# Patient Record
Sex: FEMALE | Race: WHITE | NOT HISPANIC OR LATINO | Employment: OTHER | URBAN - METROPOLITAN AREA
[De-identification: names, ages, dates, MRNs, and addresses within clinical notes are randomized per-mention and may not be internally consistent; named-entity substitution may affect disease eponyms.]

---

## 2020-03-12 ENCOUNTER — APPOINTMENT (OUTPATIENT)
Dept: WOMENS IMAGING | Facility: HOSPITAL | Age: 75
End: 2020-03-12

## 2020-03-12 PROCEDURE — 77067 SCR MAMMO BI INCL CAD: CPT | Performed by: RADIOLOGY

## 2020-09-14 ENCOUNTER — TRANSCRIBE ORDERS (OUTPATIENT)
Dept: ADMINISTRATIVE | Facility: HOSPITAL | Age: 75
End: 2020-09-14

## 2020-09-14 DIAGNOSIS — R06.02 SHORTNESS OF BREATH: ICD-10-CM

## 2020-09-14 DIAGNOSIS — R07.2 PRECORDIAL PAIN: Primary | ICD-10-CM

## 2020-09-22 ENCOUNTER — TRANSCRIBE ORDERS (OUTPATIENT)
Dept: ADMINISTRATIVE | Facility: HOSPITAL | Age: 75
End: 2020-09-22

## 2020-09-22 DIAGNOSIS — R06.02 EXERCISE-INDUCED SHORTNESS OF BREATH: Primary | ICD-10-CM

## 2020-10-20 ENCOUNTER — APPOINTMENT (OUTPATIENT)
Dept: CT IMAGING | Facility: HOSPITAL | Age: 75
End: 2020-10-20

## 2020-11-30 ENCOUNTER — APPOINTMENT (OUTPATIENT)
Dept: CT IMAGING | Facility: HOSPITAL | Age: 75
End: 2020-11-30

## 2021-04-22 ENCOUNTER — OFFICE VISIT (OUTPATIENT)
Dept: CARDIOLOGY | Facility: CLINIC | Age: 76
End: 2021-04-22

## 2021-04-22 VITALS
WEIGHT: 129.4 LBS | OXYGEN SATURATION: 98 % | HEIGHT: 55 IN | RESPIRATION RATE: 18 BRPM | BODY MASS INDEX: 29.94 KG/M2 | SYSTOLIC BLOOD PRESSURE: 124 MMHG | DIASTOLIC BLOOD PRESSURE: 66 MMHG | HEART RATE: 103 BPM

## 2021-04-22 DIAGNOSIS — R00.2 PALPITATIONS: Primary | ICD-10-CM

## 2021-04-22 DIAGNOSIS — I20.8 STABLE ANGINA PECTORIS (HCC): ICD-10-CM

## 2021-04-22 PROBLEM — R07.9 CHEST PAIN: Status: ACTIVE | Noted: 2021-04-22

## 2021-04-22 PROCEDURE — 99204 OFFICE O/P NEW MOD 45 MIN: CPT | Performed by: INTERNAL MEDICINE

## 2021-04-22 RX ORDER — LANOLIN ALCOHOL/MO/W.PET/CERES
1000 CREAM (GRAM) TOPICAL DAILY
COMMUNITY
End: 2022-06-24

## 2021-04-22 NOTE — PROGRESS NOTES
[facility] [place]  [facility] CARDIOLOGY  [address] [place] [address]  Dept: [phone]  Dept Fax: [phone]    Nesha Bacon  1945    New Patient Office Note    History of Present Illness:  Nesha Bacon is a 76 y.o. female who presents to the clinic for Palpitations and Chest pain, She is 76, years old has seen [facility] cardiology, in 2019 for chest pain, has had a stress nuclear normal, she keeps having chest pain, at resting and also walking, almost daily, she also feels  Like she is  Going to passed out with palpitations  That last for 5 minutes, on a daily basis, , her EKG is abnormal, suggest anteroseptal MI, her cardiac exam is normal, she denies any DM< Hypertension,    No smoker, her cardiac exam is normal BP is 120.80. , will get a Holter and laos a CTA coronary arteries.     The following portions of the patient's history were reviewed and updated as appropriate: allergies, current medications, past family history, past medical history, past social history, past surgical history and problem list.    Medications:  vitamin B-12    Subjective  Allergies   Allergen Reactions   • Aspirin Palpitations     Chest tightness   • Latex Rash        Past Medical History:   Diagnosis Date   • Acute cystitis without hematuria    • Age-related osteoporosis without current pathological fracture    • Back pain    • BMI 20.0-20.9, adult    • Bone injury     BONE OR JOINT INJURIES   • Chronic fatigue    • Chronic obstructive pulmonary disease (CMS/HCC)    • Closed fracture of left hip, with routine healing, subsequent encounter    • Lightheadedness     CHRONIC   • Paroxysmal atrial fibrillation (CMS/HCC)    • Postoperative anemia    • Pure hypercholesterolemia    • Renal cancer (CMS/HCC)    • Shortness of breath        Past Surgical History:   Procedure Laterality Date   • CATARACT EXTRACTION, BILATERAL     • CHOLECYSTECTOMY     • HIP FRACTURE SURGERY     • LUMBAR LAMINECTOMY     •  "NEPHRECTOMY PARTIAL     • TUBAL ABDOMINAL LIGATION     • VAGINAL HYSTERECTOMY SALPINGO OOPHORECTOMY WITH ANTERIOR AND POSTERIOR VAGINAL REPAIR         Family History   Problem Relation Age of Onset   • Stroke Mother    • Hypertension Mother    • Coronary artery disease Brother         Social History     Socioeconomic History   • Marital status: Single     Spouse name: Not on file   • Number of children: Not on file   • Years of education: Not on file   • Highest education level: Not on file   Tobacco Use   • Smoking status: Never Smoker   • Smokeless tobacco: Never Used   Substance and Sexual Activity   • Alcohol use: Not Currently   • Drug use: Never       Review of Systems   Constitutional: Negative.    HENT: Negative.    Respiratory: Negative.    Cardiovascular: Positive for chest pain and palpitations.   Endocrine: Negative.    Genitourinary: Negative.    Musculoskeletal: Negative.    Skin: Negative.    Allergic/Immunologic: Negative.    Neurological: Negative.    Hematological: Negative.    Psychiatric/Behavioral: Negative.        Cardiovascular Procedures    ECHO/MUGA:   STRESS TESTS:   CARDIAC CATH:   DEVICES:   HOLTER:   CT/MRI:   VASCULAR:   CARDIOTHORACIC:     Objective  Vitals:    04/22/21 1521   BP: 124/66   BP Location: Left arm   Patient Position: Sitting   Cuff Size: Adult   Pulse: 103   Resp: 18   SpO2: 98%   Weight: 58.7 kg (129 lb 6.4 oz)   Height: 66 cm (25.98\")       Physical Exam  Constitutional:       Appearance: Healthy appearance. Not in distress.   Neck:      Vascular: No JVR. JVD normal.   Pulmonary:      Effort: Pulmonary effort is normal.      Breath sounds: Normal breath sounds. No wheezing. No rhonchi. No rales.   Chest:      Chest wall: Not tender to palpatation.   Cardiovascular:      PMI at left midclavicular line. Normal rate. Regular rhythm. Normal S1. Normal S2.      Murmurs: There is no murmur.      No gallop. No click. No rub.   Pulses:     Intact distal pulses.   Edema:     " Peripheral edema absent.   Abdominal:      General: Bowel sounds are normal.      Palpations: Abdomen is soft.      Tenderness: There is no abdominal tenderness.   Musculoskeletal: Normal range of motion.         General: No tenderness. Skin:     General: Skin is warm and dry.   Neurological:      General: No focal deficit present.      Mental Status: Alert and oriented to person, place and time.          Diagnostic Data  Procedures    Assessment and Plan  Diagnoses and all orders for this visit:    Palpitations- this happens almost daily , will get a Holter, prior echo in 2019 was normal    Stable angina pectoris (CMS/HCC)- She keeps having chest pain, prior stress nuclear was normal 2019, will get a CTA,     Other orders  -     vitamin B-12 (CYANOCOBALAMIN) 1000 MCG tablet; Take 1,000 mcg by mouth Daily.  -     Holter Monitor - 48 Hour; Future  -     Stress Test With Myocardial Perfusion One Day; Future        No follow-ups on file.    Adiel Kang MD  04/22/2021

## 2021-04-30 ENCOUNTER — OFFICE VISIT (OUTPATIENT)
Dept: CARDIOLOGY | Facility: CLINIC | Age: 76
End: 2021-04-30

## 2021-04-30 VITALS — WEIGHT: 125 LBS | BODY MASS INDEX: 20.09 KG/M2 | HEIGHT: 66 IN

## 2021-04-30 DIAGNOSIS — R00.2 PALPITATIONS: Primary | ICD-10-CM

## 2021-04-30 DIAGNOSIS — I20.8 STABLE ANGINA PECTORIS (HCC): ICD-10-CM

## 2021-04-30 PROCEDURE — 99214 OFFICE O/P EST MOD 30 MIN: CPT | Performed by: INTERNAL MEDICINE

## 2021-04-30 RX ORDER — FLECAINIDE ACETATE 50 MG/1
50 TABLET ORAL 2 TIMES DAILY
Qty: 180 TABLET | Refills: 3 | Status: SHIPPED | OUTPATIENT
Start: 2021-04-30 | End: 2021-05-06 | Stop reason: SDUPTHER

## 2021-04-30 NOTE — PROGRESS NOTES
[facility] [place]  [facility] CARDIOLOGY  [address] [place] [address]  Dept: [phone]  Dept Fax: [phone]    Nesha Bacon  1945    Televisit Note    You have chosen to receive care through a telephone visit. Do you consent to use a telephone visit for your medical care today? Yes    History of Present Illness:  Nesha Bacon is a 76 y.o. female who presents via phone for Follow-up. She underwent the Holter monitor and this show multiples events of flutter- fibrillation., will use Tambocor 50 mg bid and also Xarelto 20 mg daily     The following portions of the patient's history were reviewed and updated as appropriate: allergies, current medications, past family history, past medical history, past social history, past surgical history and problem list.    This visit was scheduled as a phone visit to comply with patient safety concerns in accordance with CDC recommendations.  Time spent in discussion with the patient was 30 minutes.     Medications  vitamin B-12    Subjective  Allergies   Allergen Reactions   • Aspirin Palpitations     Chest tightness   • Latex Rash        Past Medical History:   Diagnosis Date   • Acute cystitis without hematuria    • Age-related osteoporosis without current pathological fracture    • Back pain    • BMI 20.0-20.9, adult    • Bone injury     BONE OR JOINT INJURIES   • Chronic fatigue    • Chronic obstructive pulmonary disease (CMS/HCC)    • Closed fracture of left hip, with routine healing, subsequent encounter    • Lightheadedness     CHRONIC   • Paroxysmal atrial fibrillation (CMS/HCC)    • Postoperative anemia    • Pure hypercholesterolemia    • Renal cancer (CMS/HCC)    • Shortness of breath        Past Surgical History:   Procedure Laterality Date   • CATARACT EXTRACTION, BILATERAL     • CHOLECYSTECTOMY     • HIP FRACTURE SURGERY     • LUMBAR LAMINECTOMY     • NEPHRECTOMY PARTIAL     • TUBAL ABDOMINAL LIGATION     • VAGINAL  "HYSTERECTOMY SALPINGO OOPHORECTOMY WITH ANTERIOR AND POSTERIOR VAGINAL REPAIR         Family History   Problem Relation Age of Onset   • Stroke Mother    • Hypertension Mother    • Coronary artery disease Brother         Social History     Socioeconomic History   • Marital status: Single     Spouse name: Not on file   • Number of children: Not on file   • Years of education: Not on file   • Highest education level: Not on file   Tobacco Use   • Smoking status: Never Smoker   • Smokeless tobacco: Never Used   Substance and Sexual Activity   • Alcohol use: Not Currently   • Drug use: Never   • Sexual activity: Defer       Cardiovascular Procedures    ECHO/MUGA:   STRESS TESTS:   CARDIAC CATH:   DEVICES:   HOLTER:   CT/MRI:   VASCULAR:   CARDIOTHORACIC:     Objective  Vitals:    04/30/21 1612   Weight: 56.7 kg (125 lb)   Height: 167.6 cm (66\")   PainSc: 0-No pain     Body mass index is 20.18 kg/m².    Assessment and Plan  Diagnoses and all orders for this visit:    Paroxysmal atrial fibrillation - Holter multiples events, will use Tambocor 50 mg bid and also Xarelto 20 mg     Stable angina pectoris (CMS/HCC)- waiting for the CTA         No follow-ups on file.    Adiel Kang MD  04/30/2021  "

## 2021-05-04 ENCOUNTER — TELEPHONE (OUTPATIENT)
Dept: CARDIOLOGY | Facility: CLINIC | Age: 76
End: 2021-05-04

## 2021-05-06 ENCOUNTER — OFFICE VISIT (OUTPATIENT)
Dept: CARDIOLOGY | Facility: CLINIC | Age: 76
End: 2021-05-06

## 2021-05-06 ENCOUNTER — TELEPHONE (OUTPATIENT)
Dept: CARDIOLOGY | Facility: CLINIC | Age: 76
End: 2021-05-06

## 2021-05-06 VITALS — HEIGHT: 66 IN | BODY MASS INDEX: 20.25 KG/M2 | WEIGHT: 126 LBS

## 2021-05-06 DIAGNOSIS — I48.0 PAROXYSMAL ATRIAL FIBRILLATION (HCC): Primary | ICD-10-CM

## 2021-05-06 DIAGNOSIS — I20.8 STABLE ANGINA PECTORIS (HCC): ICD-10-CM

## 2021-05-06 DIAGNOSIS — R00.2 PALPITATIONS: ICD-10-CM

## 2021-05-06 PROCEDURE — 99443 PR PHYS/QHP TELEPHONE EVALUATION 21-30 MIN: CPT | Performed by: INTERNAL MEDICINE

## 2021-05-06 RX ORDER — FLECAINIDE ACETATE 100 MG/1
100 TABLET ORAL 2 TIMES DAILY
Qty: 60 TABLET | Refills: 3 | Status: SHIPPED | OUTPATIENT
Start: 2021-05-06 | End: 2021-09-20 | Stop reason: SDUPTHER

## 2021-05-06 NOTE — PROGRESS NOTES
[facility] [place]  [facility] CARDIOLOGY  [address] [place] [address]  Dept: [phone]  Dept Fax: [phone]    Nesha Bacon  1945    Televisit Note    You have chosen to receive care through a telephone visit. Do you consent to use a telephone visit for your medical care today? Yes    History of Present Illness:  Nesha Bacon is a 76 y.o. female who presents via  Phone for Paroxysmal atrial fibrillation- She thinks is not improving despite taking Tambocor 50 mg bid, , will increase Further to 100 mg bid, , will keep Xarelto 20 mg     The following portions of the patient's history were reviewed and updated as appropriate: allergies, current medications, past family history, past medical history, past social history, past surgical history and problem list.    This visit was scheduled as a phone visit to comply with patient safety concerns in accordance with CDC recommendations.  Time spent in discussion with the patient was 30 minutes.     Medications  flecainide  rivaroxaban  vitamin B-12    Subjective  Allergies   Allergen Reactions   • Aspirin Palpitations     Chest tightness   • Latex Rash        Past Medical History:   Diagnosis Date   • Acute cystitis without hematuria    • Age-related osteoporosis without current pathological fracture    • Back pain    • BMI 20.0-20.9, adult    • Bone injury     BONE OR JOINT INJURIES   • Chronic fatigue    • Chronic obstructive pulmonary disease (CMS/HCC)    • Closed fracture of left hip, with routine healing, subsequent encounter    • Lightheadedness     CHRONIC   • Paroxysmal atrial fibrillation (CMS/HCC)    • Postoperative anemia    • Pure hypercholesterolemia    • Renal cancer (CMS/HCC)    • Shortness of breath        Past Surgical History:   Procedure Laterality Date   • CATARACT EXTRACTION, BILATERAL     • CHOLECYSTECTOMY     • HIP FRACTURE SURGERY     • LUMBAR LAMINECTOMY     • NEPHRECTOMY PARTIAL     • TUBAL ABDOMINAL  "LIGATION     • VAGINAL HYSTERECTOMY SALPINGO OOPHORECTOMY WITH ANTERIOR AND POSTERIOR VAGINAL REPAIR         Family History   Problem Relation Age of Onset   • Stroke Mother    • Hypertension Mother    • Coronary artery disease Brother         Social History     Socioeconomic History   • Marital status: Single     Spouse name: Not on file   • Number of children: Not on file   • Years of education: Not on file   • Highest education level: Not on file   Tobacco Use   • Smoking status: Never Smoker   • Smokeless tobacco: Never Used   Substance and Sexual Activity   • Alcohol use: Not Currently   • Drug use: Never   • Sexual activity: Defer       Cardiovascular Procedures    ECHO/MUGA:   STRESS TESTS:   CARDIAC CATH:   DEVICES:   HOLTER:   CT/MRI:   VASCULAR:   CARDIOTHORACIC:     Objective  Vitals:    05/06/21 1145   Weight: 57.2 kg (126 lb)   Height: 167.6 cm (66\")   PainSc: 0-No pain     Body mass index is 20.34 kg/m².    Assessment and Plan  Diagnoses and all orders for this visit:    Paroxysmal atrial fibrillation (CMS/HCC)-- She seems is not better, will increase Tambocor to 100 mg bid, keep Xarelto, will see her in 2 weeks         Stable angina pectoris (CMS/HCC)- she is waiting for the CTA coronary arteries , stress nuclear was normal         Return in about 2 weeks (around 5/20/2021) for Recheck.    Adiel Kang MD  05/06/2021  "

## 2021-05-20 ENCOUNTER — CLINICAL SUPPORT (OUTPATIENT)
Dept: CARDIOLOGY | Facility: CLINIC | Age: 76
End: 2021-05-20

## 2021-05-20 DIAGNOSIS — I48.0 PAROXYSMAL ATRIAL FIBRILLATION (HCC): Primary | ICD-10-CM

## 2021-05-20 PROCEDURE — 93000 ELECTROCARDIOGRAM COMPLETE: CPT | Performed by: INTERNAL MEDICINE

## 2021-05-20 NOTE — PROGRESS NOTES
ECG 12 Lead    Date/Time: 5/20/2021 3:56 PM  Performed by: Adiel Kang MD  Authorized by: Adiel Kang MD   Comparison: compared with previous ECG   Rhythm: sinus rhythm  Rate: normal  BPM: 72  QRS axis: left    Clinical impression: normal ECG

## 2021-05-21 ENCOUNTER — TELEPHONE (OUTPATIENT)
Dept: CARDIOLOGY | Facility: CLINIC | Age: 76
End: 2021-05-21

## 2021-06-01 ENCOUNTER — HOSPITAL ENCOUNTER (OUTPATIENT)
Dept: CT IMAGING | Facility: HOSPITAL | Age: 76
Discharge: HOME | End: 2021-06-01
Admitting: INTERNAL MEDICINE

## 2021-06-01 VITALS
TEMPERATURE: 97.8 F | WEIGHT: 135.4 LBS | SYSTOLIC BLOOD PRESSURE: 126 MMHG | RESPIRATION RATE: 20 BRPM | DIASTOLIC BLOOD PRESSURE: 68 MMHG | HEIGHT: 66 IN | BODY MASS INDEX: 21.76 KG/M2 | OXYGEN SATURATION: 97 % | HEART RATE: 95 BPM

## 2021-06-01 DIAGNOSIS — I20.8 STABLE ANGINA PECTORIS (HCC): ICD-10-CM

## 2021-06-01 PROCEDURE — 75574 CT ANGIO HRT W/3D IMAGE: CPT

## 2021-06-01 PROCEDURE — 63710000001 NITROGLYCERIN 0.4 MG SUBLINGUAL TABLET 25 EACH BOTTLE

## 2021-06-01 PROCEDURE — 0 IOPAMIDOL PER 1 ML: Performed by: INTERNAL MEDICINE

## 2021-06-01 PROCEDURE — 82565 ASSAY OF CREATININE: CPT

## 2021-06-01 PROCEDURE — A9270 NON-COVERED ITEM OR SERVICE: HCPCS

## 2021-06-01 RX ORDER — METOPROLOL TARTRATE 50 MG/1
100 TABLET, FILM COATED ORAL ONCE AS NEEDED
Status: DISCONTINUED | OUTPATIENT
Start: 2021-06-01 | End: 2021-06-02 | Stop reason: HOSPADM

## 2021-06-01 RX ORDER — NITROGLYCERIN 0.4 MG/1
TABLET SUBLINGUAL
Status: COMPLETED
Start: 2021-06-01 | End: 2021-06-01

## 2021-06-01 RX ORDER — METOPROLOL TARTRATE 50 MG/1
50 TABLET, FILM COATED ORAL ONCE AS NEEDED
Status: DISCONTINUED | OUTPATIENT
Start: 2021-06-01 | End: 2021-06-02 | Stop reason: HOSPADM

## 2021-06-01 RX ORDER — SODIUM CHLORIDE 0.9 % (FLUSH) 0.9 %
10 SYRINGE (ML) INJECTION AS NEEDED
Status: DISCONTINUED | OUTPATIENT
Start: 2021-06-01 | End: 2021-06-02 | Stop reason: HOSPADM

## 2021-06-01 RX ADMIN — NITROGLYCERIN: 0.4 TABLET SUBLINGUAL at 10:09

## 2021-06-01 RX ADMIN — IOPAMIDOL 65 ML: 755 INJECTION, SOLUTION INTRAVENOUS at 10:10

## 2021-06-03 ENCOUNTER — OFFICE VISIT (OUTPATIENT)
Dept: CARDIOLOGY | Facility: CLINIC | Age: 76
End: 2021-06-03

## 2021-06-03 VITALS
TEMPERATURE: 98 F | OXYGEN SATURATION: 96 % | HEART RATE: 85 BPM | HEIGHT: 66 IN | BODY MASS INDEX: 21.69 KG/M2 | RESPIRATION RATE: 11 BRPM | SYSTOLIC BLOOD PRESSURE: 136 MMHG | WEIGHT: 135 LBS | DIASTOLIC BLOOD PRESSURE: 72 MMHG

## 2021-06-03 DIAGNOSIS — I48.0 PAROXYSMAL ATRIAL FIBRILLATION (HCC): ICD-10-CM

## 2021-06-03 DIAGNOSIS — I20.8 STABLE ANGINA PECTORIS (HCC): Primary | ICD-10-CM

## 2021-06-03 PROCEDURE — 93000 ELECTROCARDIOGRAM COMPLETE: CPT | Performed by: INTERNAL MEDICINE

## 2021-06-03 PROCEDURE — 99214 OFFICE O/P EST MOD 30 MIN: CPT | Performed by: INTERNAL MEDICINE

## 2021-06-03 NOTE — PROGRESS NOTES
[facility] [place]  [facility] CARDIOLOGY  [address] [place] [address]  Dept: [phone]  Dept Fax: [phone]    Nesha Bacon  1945    Follow Up Office Visit Note    History of Present Illness:  Nesha Bacon is a 76 y.o. female who presents to the clinic for Follow-up, Shortness of Breath, and Dizziness. And also palpitations, We have done a Holter and has multiples episodes of PAF. We placed her on flecainide 50 mg bid, no better, we increased to 100 mg bid, still have complaints ? Weakness, dizzy, feeling like is going to passed out, will hget a Holter 48 hours,  She is also on Xarelto     The following portions of the patient's history were reviewed and updated as appropriate: allergies, current medications, past family history, past medical history, past social history, past surgical history and problem list.    Medications:  flecainide  rivaroxaban  vitamin B-12    Subjective  Allergies   Allergen Reactions   • Aspirin Palpitations     Chest tightness   • Latex Rash        Past Medical History:   Diagnosis Date   • Acute cystitis without hematuria    • Age-related osteoporosis without current pathological fracture    • Back pain    • BMI 20.0-20.9, adult    • Bone injury     BONE OR JOINT INJURIES   • Chronic fatigue    • Chronic obstructive pulmonary disease (CMS/HCC)    • Closed fracture of left hip, with routine healing, subsequent encounter    • Lightheadedness     CHRONIC   • Paroxysmal atrial fibrillation (CMS/HCC)    • Postoperative anemia    • Pure hypercholesterolemia    • Renal cancer (CMS/HCC)    • Shortness of breath        Past Surgical History:   Procedure Laterality Date   • CATARACT EXTRACTION, BILATERAL     • CHOLECYSTECTOMY     • HIP FRACTURE SURGERY     • LUMBAR LAMINECTOMY     • NEPHRECTOMY PARTIAL     • TUBAL ABDOMINAL LIGATION     • VAGINAL HYSTERECTOMY SALPINGO OOPHORECTOMY WITH ANTERIOR AND POSTERIOR VAGINAL REPAIR         Family  "History   Problem Relation Age of Onset   • Stroke Mother    • Hypertension Mother    • Coronary artery disease Brother         Social History     Socioeconomic History   • Marital status: Single     Spouse name: Not on file   • Number of children: Not on file   • Years of education: Not on file   • Highest education level: Not on file   Tobacco Use   • Smoking status: Never Smoker   • Smokeless tobacco: Never Used   Substance and Sexual Activity   • Alcohol use: Not Currently   • Drug use: Never   • Sexual activity: Defer       Review of Systems   Cardiovascular: Positive for palpitations.   Neurological: Positive for dizziness and light-headedness.       Cardiovascular Procedures    ECHO/MUGA:   STRESS TESTS:   CARDIAC CATH:   DEVICES:   HOLTER:   CT/MRI:   VASCULAR:   CARDIOTHORACIC:     Objective  Vitals:    06/03/21 1518   BP: 136/72   BP Location: Left arm   Patient Position: Sitting   Cuff Size: Adult   Pulse: 85   Resp: 11   Temp: 98 °F (36.7 °C)   TempSrc: Infrared   SpO2: 96%   Weight: 61.2 kg (135 lb)   Height: 167.6 cm (66\")   PainSc: 0-No pain     Body mass index is 21.79 kg/m².     Physical Exam  Constitutional:       Appearance: Healthy appearance. Not in distress.   Neck:      Vascular: No JVR. JVD normal.   Pulmonary:      Effort: Pulmonary effort is normal.      Breath sounds: Normal breath sounds. No wheezing. No rhonchi. No rales.   Chest:      Chest wall: Not tender to palpatation.   Cardiovascular:      PMI at left midclavicular line. Normal rate. Regular rhythm. Normal S1. Normal S2.      Murmurs: There is no murmur.      No gallop. No click. No rub.   Pulses:     Intact distal pulses.   Edema:     Peripheral edema absent.   Abdominal:      General: Bowel sounds are normal.      Palpations: Abdomen is soft.      Tenderness: There is no abdominal tenderness.   Musculoskeletal: Normal range of motion.         General: No tenderness. Skin:     General: Skin is warm and dry.   Neurological:      " General: No focal deficit present.      Mental Status: Alert and oriented to person, place and time.          Diagnostic Data    ECG 12 Lead    Date/Time: 6/3/2021 3:48 PM  Performed by: Adiel Kang MD  Authorized by: Adiel Kang MD   Comparison: compared with previous ECG   Rhythm: sinus rhythm  Rate: normal  BPM: 70  Conduction: left anterior fascicular block  QRS axis: left    Clinical impression: normal ECG            Assessment and Plan  Diagnoses and all orders for this visit:    Stable angina pectoris (CMS/HCC)- Go the CTA pending results     Paroxysmal atrial fibrillation (CMS/HCC)- persistent symptoms despite flecainide  100 mg bid, will repeat Holter  -     Holter Monitor - 48 Hour; Future         Return in about 3 weeks (around 6/24/2021).    Adiel Kang MD  06/03/2021

## 2021-06-04 ENCOUNTER — TELEPHONE (OUTPATIENT)
Dept: CARDIOLOGY | Facility: CLINIC | Age: 76
End: 2021-06-04

## 2021-06-04 NOTE — TELEPHONE ENCOUNTER
Normal heart no blockages, therefore her chest pain, is not cardiac, possible related to emotional stress , please see PCP.  Pt contacted

## 2021-06-07 LAB — CREAT BLDA-MCNC: 0.9 MG/DL (ref 0.6–1.3)

## 2021-07-12 ENCOUNTER — TELEPHONE (OUTPATIENT)
Dept: CARDIOLOGY | Facility: CLINIC | Age: 76
End: 2021-07-12

## 2021-07-12 NOTE — TELEPHONE ENCOUNTER
XARELTO IS TOO EXPENSIVE,,,,,,, IS THERE SOMETHING ELSE ?      ALSO WOULD LIKE TO KNOW HOLTOR MONITOR RESULTS

## 2021-07-13 NOTE — TELEPHONE ENCOUNTER
Please advise...    XARELTO IS TOO EXPENSIVE,,,,,,, IS THERE SOMETHING ELSE ?        ALSO WOULD LIKE TO KNOW HOLTOR MONITOR RESULTS

## 2021-07-13 NOTE — TELEPHONE ENCOUNTER
Please ask the patient to call her insurance and find out if Xarelto, Eliquis or Pradaxa might be lower price maybe is the deducible

## 2021-07-30 ENCOUNTER — OFFICE VISIT (OUTPATIENT)
Dept: CARDIOLOGY | Facility: CLINIC | Age: 76
End: 2021-07-30

## 2021-07-30 VITALS
RESPIRATION RATE: 12 BRPM | BODY MASS INDEX: 21.86 KG/M2 | HEIGHT: 66 IN | HEART RATE: 94 BPM | SYSTOLIC BLOOD PRESSURE: 130 MMHG | TEMPERATURE: 97 F | OXYGEN SATURATION: 97 % | WEIGHT: 136 LBS | DIASTOLIC BLOOD PRESSURE: 70 MMHG

## 2021-07-30 DIAGNOSIS — I48.0 PAROXYSMAL ATRIAL FIBRILLATION (HCC): ICD-10-CM

## 2021-07-30 DIAGNOSIS — I20.8 STABLE ANGINA PECTORIS (HCC): ICD-10-CM

## 2021-07-30 DIAGNOSIS — R00.2 PALPITATIONS: Primary | ICD-10-CM

## 2021-07-30 PROCEDURE — 99214 OFFICE O/P EST MOD 30 MIN: CPT | Performed by: INTERNAL MEDICINE

## 2021-07-30 PROCEDURE — 93000 ELECTROCARDIOGRAM COMPLETE: CPT | Performed by: INTERNAL MEDICINE

## 2021-07-30 RX ORDER — METOPROLOL SUCCINATE 25 MG/1
25 TABLET, EXTENDED RELEASE ORAL DAILY
Qty: 30 TABLET | Refills: 11 | Status: SHIPPED | OUTPATIENT
Start: 2021-07-30 | End: 2022-06-07 | Stop reason: SDUPTHER

## 2021-07-30 NOTE — PROGRESS NOTES
[facility] [place]  [facility] CARDIOLOGY  [address] [place] [address]  Dept: [phone]  Dept Fax: [phone]    Nesha Bacon  1945    Follow Up Office Visit Note    History of Present Illness:  Nesha Bacon is a 76 y.o. female who presents to the clinic for Follow-up and Dizziness. She keeps having the complaints dizziness, fast heart rate , Holter only showed & beats of AF Hr 130. otherwise was  Fine, her complaints are out of proportion to her findings, her CTA was normal, Echo normal. On tambocor 100 mg bid and also Xarelto 20 mg daily, will add Toprol xl 25 mg    The following portions of the patient's history were reviewed and updated as appropriate: allergies, current medications, past family history, past medical history, past social history, past surgical history and problem list.    Medications:  flecainide  metoprolol succinate XL  rivaroxaban  vitamin B-12    Subjective  Allergies   Allergen Reactions   • Aspirin Palpitations     Chest tightness   • Latex Rash        Past Medical History:   Diagnosis Date   • Acute cystitis without hematuria    • Age-related osteoporosis without current pathological fracture    • Back pain    • BMI 20.0-20.9, adult    • Bone injury     BONE OR JOINT INJURIES   • Chronic fatigue    • Chronic obstructive pulmonary disease (CMS/HCC)    • Closed fracture of left hip, with routine healing, subsequent encounter    • Lightheadedness     CHRONIC   • Paroxysmal atrial fibrillation (CMS/HCC)    • Postoperative anemia    • Pure hypercholesterolemia    • Renal cancer (CMS/HCC)    • Shortness of breath        Past Surgical History:   Procedure Laterality Date   • CATARACT EXTRACTION, BILATERAL     • CHOLECYSTECTOMY     • HIP FRACTURE SURGERY     • LUMBAR LAMINECTOMY     • NEPHRECTOMY PARTIAL     • TUBAL ABDOMINAL LIGATION     • VAGINAL HYSTERECTOMY SALPINGO OOPHORECTOMY WITH ANTERIOR AND POSTERIOR VAGINAL REPAIR         Family  "History   Problem Relation Age of Onset   • Stroke Mother    • Hypertension Mother    • Coronary artery disease Brother         Social History     Socioeconomic History   • Marital status: Single     Spouse name: Not on file   • Number of children: Not on file   • Years of education: Not on file   • Highest education level: Not on file   Tobacco Use   • Smoking status: Never Smoker   • Smokeless tobacco: Never Used   Substance and Sexual Activity   • Alcohol use: Not Currently   • Drug use: Never   • Sexual activity: Defer       Review of Systems   Constitutional: Negative.    HENT: Negative.    Respiratory: Positive for chest tightness.    Cardiovascular: Positive for chest pain.   Endocrine: Negative.    Genitourinary: Negative.    Musculoskeletal: Negative.    Skin: Negative.    Allergic/Immunologic: Negative.    Neurological: Positive for dizziness.   Hematological: Negative.    Psychiatric/Behavioral: Negative.    All other systems reviewed and are negative.      Cardiovascular Procedures    ECHO/MUGA:   STRESS TESTS:   CARDIAC CATH:   DEVICES:   HOLTER:   CT/MRI:   VASCULAR:   CARDIOTHORACIC:     Objective  Vitals:    07/30/21 1132   BP: 130/70   BP Location: Left arm   Patient Position: Lying   Cuff Size: Adult   Pulse: 94   Resp: 12   Temp: 97 °F (36.1 °C)   TempSrc: Infrared   SpO2: 97%   Weight: 61.7 kg (136 lb)   Height: 167.6 cm (66\")   PainSc: 0-No pain     Body mass index is 21.95 kg/m².     Physical Exam  Constitutional:       Appearance: Healthy appearance. Not in distress.   Neck:      Vascular: No JVR. JVD normal.   Pulmonary:      Effort: Pulmonary effort is normal.      Breath sounds: Normal breath sounds. No wheezing. No rhonchi. No rales.   Chest:      Chest wall: Not tender to palpatation.   Cardiovascular:      PMI at left midclavicular line. Normal rate. Regular rhythm. Normal S1. Normal S2.      Murmurs: There is no murmur.      No gallop. No click. No rub.   Pulses:     Intact distal " pulses.   Edema:     Peripheral edema absent.   Abdominal:      General: Bowel sounds are normal.      Palpations: Abdomen is soft.      Tenderness: There is no abdominal tenderness.   Musculoskeletal: Normal range of motion.         General: No tenderness. Skin:     General: Skin is warm and dry.   Neurological:      General: No focal deficit present.      Mental Status: Alert and oriented to person, place and time.          Diagnostic Data    ECG 12 Lead    Date/Time: 7/30/2021 11:59 AM  Performed by: Adiel Kang MD  Authorized by: Adiel Kang MD   Comparison: compared with previous ECG   Similar to previous ECG  Rhythm: sinus rhythm  Rate: normal  BPM: 75  QRS axis: left    Clinical impression: normal ECG            Assessment and Plan  Diagnoses and all orders for this visit:    Palpitations-Her complaints are out of proportion to her findings,         Paroxysmal atrial fibrillation (CMS/HCC)- On Tambocor 100 mg bid and also Xarelto 20 mg  Chest pain- stress nuclear, normal CTA coronary arteries normal    Other orders  -     metoprolol succinate XL (TOPROL-XL) 25 MG 24 hr tablet; Take 1 tablet by mouth Daily.         Return in about 4 months (around 11/30/2021) for Recheck.    Adiel Kang MD  07/30/2021

## 2021-09-20 DIAGNOSIS — I48.0 PAROXYSMAL ATRIAL FIBRILLATION (HCC): ICD-10-CM

## 2021-09-20 DIAGNOSIS — R00.2 PALPITATIONS: ICD-10-CM

## 2021-09-20 RX ORDER — FLECAINIDE ACETATE 100 MG/1
100 TABLET ORAL 2 TIMES DAILY
Qty: 60 TABLET | Refills: 1 | Status: SHIPPED | OUTPATIENT
Start: 2021-09-20 | End: 2021-12-07

## 2021-11-11 ENCOUNTER — APPOINTMENT (OUTPATIENT)
Dept: WOMENS IMAGING | Facility: HOSPITAL | Age: 76
End: 2021-11-11

## 2021-11-11 PROCEDURE — 77067 SCR MAMMO BI INCL CAD: CPT | Performed by: RADIOLOGY

## 2021-12-07 ENCOUNTER — OFFICE VISIT (OUTPATIENT)
Dept: CARDIOLOGY | Facility: CLINIC | Age: 76
End: 2021-12-07

## 2021-12-07 VITALS
SYSTOLIC BLOOD PRESSURE: 132 MMHG | WEIGHT: 137 LBS | TEMPERATURE: 98 F | HEIGHT: 66 IN | BODY MASS INDEX: 22.02 KG/M2 | HEART RATE: 104 BPM | DIASTOLIC BLOOD PRESSURE: 74 MMHG | RESPIRATION RATE: 12 BRPM | OXYGEN SATURATION: 99 %

## 2021-12-07 DIAGNOSIS — R07.89 CHEST PAIN, ATYPICAL: ICD-10-CM

## 2021-12-07 DIAGNOSIS — R00.2 PALPITATIONS: ICD-10-CM

## 2021-12-07 DIAGNOSIS — I48.0 PAROXYSMAL ATRIAL FIBRILLATION (HCC): Primary | ICD-10-CM

## 2021-12-07 PROBLEM — R07.9 CHEST PAIN: Status: RESOLVED | Noted: 2021-04-22 | Resolved: 2021-12-07

## 2021-12-07 PROCEDURE — 99213 OFFICE O/P EST LOW 20 MIN: CPT | Performed by: INTERNAL MEDICINE

## 2021-12-07 PROCEDURE — 93000 ELECTROCARDIOGRAM COMPLETE: CPT | Performed by: INTERNAL MEDICINE

## 2021-12-07 NOTE — PROGRESS NOTES
[facility] [place]  [facility] CARDIOLOGY  [address] [place] [address]  Dept: [phone]  Dept Fax: [phone]    Nesha Bacon  1945    Follow Up Office Visit Note    History of Present Illness:  Nesha Bacon is a 76 y.o. female who presents to the clinic for Paroxysmal atrial fibrillation- She has not been on Tambocor according to her Hr slow down to 40, she is also unable to afford Xarelto,  And she is allergic to ASA. When we talk about warfarin also she thinks with all the extra will not be able to afford , will try to get Xarelto  from the company , she is only taking Toprol xl 25 mg, her holter has shown just one run of 7 beats  AF vs AT .     The following portions of the patient's history were reviewed and updated as appropriate: allergies, current medications, past family history, past medical history, past social history, past surgical history and problem list.    Medications:  metoprolol succinate XL  rivaroxaban  vitamin B-12    Subjective  Allergies   Allergen Reactions   • Aspirin Palpitations     Chest tightness   • Latex Rash        Past Medical History:   Diagnosis Date   • Acute cystitis without hematuria    • Age-related osteoporosis without current pathological fracture    • Back pain    • BMI 20.0-20.9, adult    • Bone injury     BONE OR JOINT INJURIES   • Chronic fatigue    • Chronic obstructive pulmonary disease (HCC)    • Closed fracture of left hip, with routine healing, subsequent encounter    • Lightheadedness     CHRONIC   • Paroxysmal atrial fibrillation (HCC)    • Postoperative anemia    • Pure hypercholesterolemia    • Renal cancer (HCC)    • Shortness of breath        Past Surgical History:   Procedure Laterality Date   • CATARACT EXTRACTION, BILATERAL     • CHOLECYSTECTOMY     • HIP FRACTURE SURGERY     • LUMBAR LAMINECTOMY     • NEPHRECTOMY PARTIAL     • TUBAL ABDOMINAL LIGATION     • VAGINAL HYSTERECTOMY SALPINGO OOPHORECTOMY WITH  "ANTERIOR AND POSTERIOR VAGINAL REPAIR         Family History   Problem Relation Age of Onset   • Stroke Mother    • Hypertension Mother    • Coronary artery disease Brother         Social History     Socioeconomic History   • Marital status: Single   Tobacco Use   • Smoking status: Never Smoker   • Smokeless tobacco: Never Used   Vaping Use   • Vaping Use: Never used   Substance and Sexual Activity   • Alcohol use: Not Currently   • Drug use: Never   • Sexual activity: Defer       Review of Systems   Constitutional: Negative.    HENT: Negative.    Respiratory: Negative.    Cardiovascular: Positive for palpitations.   Endocrine: Negative.    Genitourinary: Negative.    Musculoskeletal: Negative.    Skin: Negative.    Allergic/Immunologic: Negative.    Neurological: Positive for dizziness.   Hematological: Negative.    Psychiatric/Behavioral: Negative.    All other systems reviewed and are negative.      Cardiovascular Procedures    ECHO/MUGA:   STRESS TESTS:   CARDIAC CATH:   DEVICES:   HOLTER:   CT/MRI:   VASCULAR:   CARDIOTHORACIC:     Objective  Vitals:    12/07/21 1036   BP: 132/74   BP Location: Left arm   Patient Position: Sitting   Cuff Size: Adult   Pulse: 104   Resp: 12   Temp: 98 °F (36.7 °C)   TempSrc: Infrared   SpO2: 99%   Weight: 62.1 kg (137 lb)   Height: 167.6 cm (66\")   PainSc: 0-No pain     Body mass index is 22.11 kg/m².     Physical Exam  Constitutional:       Appearance: Healthy appearance. Not in distress.   Neck:      Vascular: No JVR. JVD normal.   Pulmonary:      Effort: Pulmonary effort is normal.      Breath sounds: Normal breath sounds. No wheezing. No rhonchi. No rales.   Chest:      Chest wall: Not tender to palpatation.   Cardiovascular:      PMI at left midclavicular line. Normal rate. Regular rhythm. Normal S1. Normal S2.      Murmurs: There is no murmur.      No gallop. No click. No rub.   Pulses:     Intact distal pulses.   Edema:     Peripheral edema absent.   Abdominal:      " General: Bowel sounds are normal.      Palpations: Abdomen is soft.      Tenderness: There is no abdominal tenderness.   Musculoskeletal: Normal range of motion.         General: No tenderness. Skin:     General: Skin is warm and dry.   Neurological:      General: No focal deficit present.      Mental Status: Alert and oriented to person, place and time.          Diagnostic Data    ECG 12 Lead    Date/Time: 12/7/2021 1:44 PM  Performed by: Adiel Kang MD  Authorized by: Adiel Kang MD   Rhythm: sinus rhythm  Ectopy: atrial premature contractions  Rate: normal  BPM: 84  QRS axis: left    Clinical impression: normal ECG            Assessment and Plan  Diagnoses and all orders for this visit:    Paroxysmal atrial fibrillation (HCC)- She has multiples complaints and more that the just 7 beats of atrial fibrillation seen on the monitor ,can not afford the xarelto and has stop the Flecainide , just take the Toprol xl 25 mg  Chest pain atypical  CTA negative , no more chest pain    Palpitations- Complaints often more that what we see          Return in about 6 months (around 6/7/2022) for Recheck.    Adiel Kang MD  12/07/2021

## 2021-12-15 DIAGNOSIS — R00.2 PALPITATIONS: ICD-10-CM

## 2022-06-07 ENCOUNTER — OFFICE VISIT (OUTPATIENT)
Dept: CARDIOLOGY | Facility: CLINIC | Age: 77
End: 2022-06-07

## 2022-06-07 VITALS
TEMPERATURE: 97.1 F | DIASTOLIC BLOOD PRESSURE: 56 MMHG | WEIGHT: 131 LBS | RESPIRATION RATE: 15 BRPM | HEART RATE: 98 BPM | BODY MASS INDEX: 21.05 KG/M2 | OXYGEN SATURATION: 97 % | HEIGHT: 66 IN | SYSTOLIC BLOOD PRESSURE: 96 MMHG

## 2022-06-07 DIAGNOSIS — R07.89 CHEST PAIN, ATYPICAL: ICD-10-CM

## 2022-06-07 DIAGNOSIS — R00.2 PALPITATIONS: ICD-10-CM

## 2022-06-07 DIAGNOSIS — I48.0 PAROXYSMAL ATRIAL FIBRILLATION: Primary | ICD-10-CM

## 2022-06-07 DIAGNOSIS — I95.1 AUTONOMIC ORTHOSTATIC HYPOTENSION: ICD-10-CM

## 2022-06-07 PROCEDURE — 99214 OFFICE O/P EST MOD 30 MIN: CPT | Performed by: INTERNAL MEDICINE

## 2022-06-07 RX ORDER — FLUDROCORTISONE ACETATE 0.1 MG/1
0.1 TABLET ORAL DAILY
Qty: 90 TABLET | Refills: 2 | Status: SHIPPED | OUTPATIENT
Start: 2022-06-07 | End: 2023-01-25

## 2022-06-07 RX ORDER — METOPROLOL SUCCINATE 25 MG/1
25 TABLET, EXTENDED RELEASE ORAL DAILY
Qty: 90 TABLET | Refills: 1 | Status: SHIPPED | OUTPATIENT
Start: 2022-06-07 | End: 2022-07-06 | Stop reason: SDUPTHER

## 2022-06-07 NOTE — PROGRESS NOTES
[facility] [place]  [facility] CARDIOLOGY  [address] [place] [address]  Dept: [phone]  Dept Fax: [phone]    Nesha Bacon  1945    Follow Up Office Visit Note    History of Present Illness:  Nesha Bacon is a 77 y.o. female who presents to the clinic for Follow-up. Paroxysmal atrial Fibrillation- No complaints no palpitations, on Toprol xl 25 mg, she has been complaining of dizziness lately specially after standing or being standing for long time, we check BP lying 110.60. and at 10 minutes BP dropped to 90.60, at 3 and 5 minutes, no changes, she was feeling weak and feeling like was going to passed out, this is consistent with OH, will use Midodrine 01, daily, advised to drink plenty fluids, avoid caffeine, will also  use abdominal binder, , will take the Toprol in the evening, will see her back in 1 months    The following portions of the patient's history were reviewed and updated as appropriate: allergies, current medications, past family history, past medical history, past surgical history and problem list.    Medications:  fludrocortisone  metoprolol succinate XL  rivaroxaban  vitamin B-12    Subjective  Allergies   Allergen Reactions   • Aspirin Palpitations     Chest tightness   • Latex Rash        Past Medical History:   Diagnosis Date   • Acute cystitis without hematuria    • Age-related osteoporosis without current pathological fracture    • Back pain    • BMI 20.0-20.9, adult    • Bone injury     BONE OR JOINT INJURIES   • Chronic fatigue    • Chronic obstructive pulmonary disease (HCC)    • Closed fracture of left hip, with routine healing, subsequent encounter    • Lightheadedness     CHRONIC   • Paroxysmal atrial fibrillation (HCC)    • Postoperative anemia    • Pure hypercholesterolemia    • Renal cancer (HCC)    • Shortness of breath        Past Surgical History:   Procedure Laterality Date   • CATARACT EXTRACTION, BILATERAL     •  "CHOLECYSTECTOMY     • HIP FRACTURE SURGERY     • LUMBAR LAMINECTOMY     • NEPHRECTOMY PARTIAL     • TUBAL ABDOMINAL LIGATION     • VAGINAL HYSTERECTOMY SALPINGO OOPHORECTOMY WITH ANTERIOR AND POSTERIOR VAGINAL REPAIR         Family History   Problem Relation Age of Onset   • Stroke Mother    • Hypertension Mother    • Coronary artery disease Brother         Social History     Socioeconomic History   • Marital status: Single   Tobacco Use   • Smoking status: Never Smoker   • Smokeless tobacco: Never Used   Vaping Use   • Vaping Use: Never used   Substance and Sexual Activity   • Alcohol use: Not Currently   • Drug use: Never   • Sexual activity: Defer       Review of Systems   Constitutional: Positive for appetite change.   HENT: Negative.    Respiratory: Negative.    Cardiovascular: Negative.    Endocrine: Negative.    Genitourinary: Negative.    Musculoskeletal: Negative.    Skin: Negative.    Allergic/Immunologic: Negative.    Neurological: Positive for dizziness, light-headedness and headache.   Hematological: Negative.    Psychiatric/Behavioral: Negative.        Cardiovascular Procedures    ECHO/MUGA:   STRESS TESTS:   CARDIAC CATH:   DEVICES:   HOLTER:   CT/MRI:   VASCULAR:   CARDIOTHORACIC:     Objective  Vitals:    06/07/22 1006   BP: 96/56   BP Location: Right arm   Patient Position: Sitting   Cuff Size: Adult   Pulse: 98   Resp: 15   Temp: 97.1 °F (36.2 °C)   TempSrc: Infrared   SpO2: 97%   Weight: 59.4 kg (131 lb)   Height: 167.6 cm (66\")   PainSc: 0-No pain     Body mass index is 21.14 kg/m².     Physical Exam  Vitals reviewed.   Constitutional:       Appearance: Healthy appearance. Not in distress.   Neck:      Vascular: No JVR. JVD normal.   Pulmonary:      Effort: Pulmonary effort is normal.      Breath sounds: Normal breath sounds. No wheezing. No rhonchi. No rales.   Chest:      Chest wall: Not tender to palpatation.   Cardiovascular:      PMI at left midclavicular line. Normal rate. Regular rhythm. " Normal S1. Normal S2.      Murmurs: There is no murmur.      No gallop. No click. No rub.   Pulses:     Intact distal pulses.   Edema:     Peripheral edema absent.   Abdominal:      General: Bowel sounds are normal.      Palpations: Abdomen is soft.      Tenderness: There is no abdominal tenderness.   Musculoskeletal: Normal range of motion.         General: No tenderness. Skin:     General: Skin is warm and dry.   Neurological:      General: No focal deficit present.      Mental Status: Alert and oriented to person, place and time.          Diagnostic Data  Procedures    Assessment and Plan  Diagnoses and all orders for this visit:    Paroxysmal atrial fibrillation (HCC)- In sinus on Toprol xl 25mg and Eliquis 5 mg b    Chest pain, atypical- Ct angiogram normal, no longer has chest pain,    Autonomic orthostatic hypotension- Orthostatic at 10 minutes, will use fludrocortisone 01,daily plus plenty fluids, and abdominal binder  -     Abdominal Binder    Other orders  -     fludrocortisone 0.1 MG tablet; Take 1 tablet by mouth Daily.  -     metoprolol succinate XL (TOPROL-XL) 25 MG 24 hr tablet; Take 1 tablet by mouth Daily.         Return in about 1 month (around 7/7/2022) for Recheck.    Adiel Kang MD  06/07/2022

## 2022-06-24 ENCOUNTER — OFFICE VISIT (OUTPATIENT)
Dept: FAMILY MEDICINE CLINIC | Facility: CLINIC | Age: 77
End: 2022-06-24

## 2022-06-24 VITALS
DIASTOLIC BLOOD PRESSURE: 78 MMHG | HEIGHT: 66 IN | OXYGEN SATURATION: 97 % | HEART RATE: 73 BPM | BODY MASS INDEX: 22.29 KG/M2 | SYSTOLIC BLOOD PRESSURE: 116 MMHG | WEIGHT: 138.7 LBS

## 2022-06-24 DIAGNOSIS — L30.9 ECZEMA, UNSPECIFIED TYPE: ICD-10-CM

## 2022-06-24 DIAGNOSIS — E53.8 VITAMIN B12 DEFICIENCY: Primary | ICD-10-CM

## 2022-06-24 PROBLEM — E78.2 MIXED HYPERLIPIDEMIA: Status: ACTIVE | Noted: 2022-06-24

## 2022-06-24 PROBLEM — J44.1 COPD WITH EXACERBATION: Status: ACTIVE | Noted: 2022-06-24

## 2022-06-24 PROCEDURE — 99214 OFFICE O/P EST MOD 30 MIN: CPT | Performed by: PHYSICIAN ASSISTANT

## 2022-06-24 PROCEDURE — 96372 THER/PROPH/DIAG INJ SC/IM: CPT | Performed by: PHYSICIAN ASSISTANT

## 2022-06-24 RX ORDER — CYANOCOBALAMIN 1000 UG/ML
1000 INJECTION, SOLUTION INTRAMUSCULAR; SUBCUTANEOUS WEEKLY
Status: SHIPPED | OUTPATIENT
Start: 2022-06-24 | End: 2022-07-31

## 2022-06-24 RX ORDER — CEPHALEXIN 500 MG/1
CAPSULE ORAL
COMMUNITY
Start: 2022-06-19 | End: 2022-07-13

## 2022-06-24 RX ADMIN — CYANOCOBALAMIN 1000 MCG: 1000 INJECTION, SOLUTION INTRAMUSCULAR; SUBCUTANEOUS at 13:57

## 2022-06-24 NOTE — ASSESSMENT & PLAN NOTE
AbdomenPatient has been taking vitamin B12 tablets but her B12 level has not improved therefore I am going to switch her  B12 injections she will get 4 injections once weekly for a month and then we will move her to monthly B12 injections.  She will return in August for her annual physical.

## 2022-06-24 NOTE — PROGRESS NOTES
"Chief Complaint  vitamin b12 deficiency (Patient's recent labs reveal her B12 is still low even though she takes b12 daily.  ) and Rash (On the back of her neck, it itches and has little bumps.)    Subjective          Nesha Bacon presents to [facility] PRIMARY [facility]ARE  Rash  Associated symptoms include fatigue. Pertinent negatives include no cough, fever or shortness of breath.       patient comes in for a refill on her vitamin B12 and for a rash that she has noticed on the back of her neck that itches.  States that Dr. Gómez had just recently refilled most of her other medications and he put her on a medicine to help increase her blood pressure and since then she has been feeling much better and is not as dizzy or lightheaded as she was.    Objective   Vital Signs:   /78 (BP Location: Right arm)   Pulse 73   Ht 167.6 cm (66\")   Wt 62.9 kg (138 lb 11.2 oz)   SpO2 97%   BMI 22.39 kg/m²     Body mass index is 22.39 kg/m².    Review of Systems   Constitutional: Positive for fatigue. Negative for chills and fever.   Respiratory: Negative for cough and shortness of breath.    Cardiovascular: Negative for chest pain and palpitations.   Musculoskeletal: Negative for back pain and myalgias.   Skin: Positive for rash.   Neurological: Negative for dizziness and headache.   Psychiatric/Behavioral: Negative for depressed mood. The patient is not nervous/anxious.        Past History:  Medical History: has a past medical history of Acute cystitis without hematuria, Age-related osteoporosis without current pathological fracture, Back pain, BMI 20.0-20.9, adult, Bone injury, Chronic fatigue, Chronic obstructive pulmonary disease (HCC), Closed fracture of left hip, with routine healing, subsequent encounter, Lightheadedness, Paroxysmal atrial fibrillation (HCC), Postoperative anemia, Pure hypercholesterolemia, Renal cancer (HCC), and Shortness of breath.   Surgical History: has a past surgical " history that includes Partial nephrectomy; Lumbar laminectomy; vaginal hysterectomy salpingo oophorectomy with anterior and posterior vaginal repair; Cataract extraction, bilateral; Cholecystectomy; Tubal ligation; and Hip fracture surgery.   Family History: family history includes Coronary artery disease in her brother; Hypertension in her mother; Stroke in her mother.   Social History: reports that she has never smoked. She has never used smokeless tobacco. She reports previous alcohol use. She reports that she does not use drugs.      Current Outpatient Medications:   •  cephalexin (KEFLEX) 500 MG capsule, , Disp: , Rfl:   •  fludrocortisone 0.1 MG tablet, Take 1 tablet by mouth Daily., Disp: 90 tablet, Rfl: 2  •  metoprolol succinate XL (TOPROL-XL) 25 MG 24 hr tablet, Take 1 tablet by mouth Daily., Disp: 90 tablet, Rfl: 1  •  rivaroxaban (Xarelto) 20 MG tablet, Take 1 tablet by mouth Daily., Disp: 30 tablet, Rfl: 6  •  triamcinolone (KENALOG) 0.1 % ointment, Apply 1 application topically to the appropriate area as directed 2 (Two) Times a Day., Disp: 30 g, Rfl: 3    Current Facility-Administered Medications:   •  cyanocobalamin injection 1,000 mcg, 1,000 mcg, Intramuscular, Weekly, Means, Julia PA-C, 1,000 mcg at 06/24/22 1357  Allergies: Aspirin and Latex    Physical Exam  Vitals reviewed.   Constitutional:       Appearance: Normal appearance.   Cardiovascular:      Rate and Rhythm: Normal rate and regular rhythm.      Heart sounds: Normal heart sounds.   Pulmonary:      Effort: Pulmonary effort is normal.      Breath sounds: Normal breath sounds.   Abdominal:      General: Bowel sounds are normal.      Palpations: Abdomen is soft.   Musculoskeletal:         General: Normal range of motion.   Skin:     Comments: She has an exzematous/excoriated rash at the base of her scalp on the back of her neck.    Neurological:      General: No focal deficit present.      Mental Status: She is alert and oriented to  person, place, and time.   Psychiatric:         Mood and Affect: Mood normal.             Assessment and Plan   Diagnoses and all orders for this visit:    1. Vitamin B12 deficiency (Primary)  Assessment & Plan:  AbdomenPatient has been taking vitamin B12 tablets but her B12 level has not improved therefore I am going to switch her  B12 injections she will get 4 injections once weekly for a month and then we will move her to monthly B12 injections.  She will return in August for her annual physical.    Orders:  -     cyanocobalamin injection 1,000 mcg    2. Eczema, unspecified type  Assessment & Plan:  She has what appears to be eczematous rash on the back of her neck and I am going to  treat that with some triamcinolone cream.      Other orders  -     triamcinolone (KENALOG) 0.1 % ointment; Apply 1 application topically to the appropriate area as directed 2 (Two) Times a Day.  Dispense: 30 g; Refill: 3          Follow Up   Return in about 8 weeks (around 8/17/2022) for Annual physical.  Patient was given instructions and counseling regarding her condition or for health maintenance advice. Please see specific information pulled into the AVS if appropriate.     Julia Foote PA-C

## 2022-06-24 NOTE — ASSESSMENT & PLAN NOTE
She has what appears to be eczematous rash on the back of her neck and I am going to  treat that with some triamcinolone cream.

## 2022-06-24 NOTE — PATIENT INSTRUCTIONS
Patient advised to take their medications as prescribed.  Come get a Vitamin B12 shot once a week for 4 weeks then get them once a month.

## 2022-06-24 NOTE — PROGRESS NOTES
"Chief Complaint  vitamin b12 deficiency (Patient's recent labs reveal her B12 is still low even though she takes b12 daily.  ) and Rash (On the back of her neck, it itches and has little bumps.)    Subjective          Nesha Bacon presents to [facility] PRIMARY [facility]ARE  History of Present Illness    patient comes in for a refill on her vitamin B12 and for a rash that she has noticed on the back of her neck that itches.  States that Dr. Gómez had just recently refilled most of her other medications and he put her on a medicine to help increase her blood pressure and since then she has been feeling much better and is not as dizzy or lightheaded as she was.    Objective   Vital Signs:   /78 (BP Location: Right arm)   Pulse 73   Ht 167.6 cm (66\")   Wt 62.9 kg (138 lb 11.2 oz)   SpO2 97%   BMI 22.39 kg/m²     Body mass index is 22.39 kg/m².    Review of Systems   Constitutional: Positive for fatigue. Negative for chills and fever.   Respiratory: Negative for cough and shortness of breath.    Cardiovascular: Negative for chest pain and palpitations.   Musculoskeletal: Negative for back pain and myalgias.   Skin: Positive for rash (on the back of her neck, it itches and she feels little bumps).   Neurological: Negative for dizziness and headache.   Psychiatric/Behavioral: Negative for depressed mood. The patient is not nervous/anxious.        Past History:  Medical History: has a past medical history of Acute cystitis without hematuria, Age-related osteoporosis without current pathological fracture, Back pain, BMI 20.0-20.9, adult, Bone injury, Chronic fatigue, Chronic obstructive pulmonary disease (HCC), Closed fracture of left hip, with routine healing, subsequent encounter, Lightheadedness, Paroxysmal atrial fibrillation (HCC), Postoperative anemia, Pure hypercholesterolemia, Renal cancer (HCC), and Shortness of breath.   Surgical History: has a past surgical history that includes " Partial nephrectomy; Lumbar laminectomy; vaginal hysterectomy salpingo oophorectomy with anterior and posterior vaginal repair; Cataract extraction, bilateral; Cholecystectomy; Tubal ligation; and Hip fracture surgery.   Family History: family history includes Coronary artery disease in her brother; Hypertension in her mother; Stroke in her mother.   Social History: reports that she has never smoked. She has never used smokeless tobacco. She reports previous alcohol use. She reports that she does not use drugs.      Current Outpatient Medications:   •  cephalexin (KEFLEX) 500 MG capsule, , Disp: , Rfl:   •  fludrocortisone 0.1 MG tablet, Take 1 tablet by mouth Daily., Disp: 90 tablet, Rfl: 2  •  metoprolol succinate XL (TOPROL-XL) 25 MG 24 hr tablet, Take 1 tablet by mouth Daily., Disp: 90 tablet, Rfl: 1  •  rivaroxaban (Xarelto) 20 MG tablet, Take 1 tablet by mouth Daily., Disp: 30 tablet, Rfl: 6  •  triamcinolone (KENALOG) 0.1 % ointment, Apply 1 application topically to the appropriate area as directed 2 (Two) Times a Day., Disp: 30 g, Rfl: 3    Current Facility-Administered Medications:   •  cyanocobalamin injection 1,000 mcg, 1,000 mcg, Intramuscular, Weekly, Means, BRETT Dumont-CHARMAINE, 1,000 mcg at 06/24/22 1357  Allergies: Aspirin and Latex    Physical Exam  Vitals reviewed.   Constitutional:       Appearance: Normal appearance.   Cardiovascular:      Rate and Rhythm: Normal rate and regular rhythm.      Heart sounds: Normal heart sounds.   Pulmonary:      Effort: Pulmonary effort is normal.      Breath sounds: Normal breath sounds.   Abdominal:      General: Bowel sounds are normal.      Palpations: Abdomen is soft.   Musculoskeletal:         General: Normal range of motion.   Skin:     Comments: She has an exzematous/excoriated rash at the base of her scalp on the back of her neck.    Neurological:      General: No focal deficit present.      Mental Status: She is alert and oriented to person, place, and time.    Psychiatric:         Mood and Affect: Mood normal.             Assessment and Plan   Diagnoses and all orders for this visit:    1. Vitamin B12 deficiency (Primary)  Assessment & Plan:  AbdomenPatient has been taking vitamin B12 tablets but her B12 level has not improved therefore I am going to switch her  B12 injections she will get 4 injections once weekly for a month and then we will move her to monthly B12 injections.  She will return in August for her annual physical.    Orders:  -     cyanocobalamin injection 1,000 mcg    2. Eczema, unspecified type  Assessment & Plan:  She has what appears to be eczematous rash on the back of her neck and I Antonia treat that with some triamcinolone cream.      Other orders  -     triamcinolone (KENALOG) 0.1 % ointment; Apply 1 application topically to the appropriate area as directed 2 (Two) Times a Day.  Dispense: 30 g; Refill: 3          Follow Up   Return in about 8 weeks (around 8/17/2022) for Annual physical.  Patient was given instructions and counseling regarding her condition or for health maintenance advice. Please see specific information pulled into the AVS if appropriate.     Julia Foote PA-C

## 2022-07-01 ENCOUNTER — CLINICAL SUPPORT (OUTPATIENT)
Dept: FAMILY MEDICINE CLINIC | Facility: CLINIC | Age: 77
End: 2022-07-01

## 2022-07-01 DIAGNOSIS — E53.8 VITAMIN B12 DEFICIENCY: ICD-10-CM

## 2022-07-01 PROCEDURE — 96372 THER/PROPH/DIAG INJ SC/IM: CPT | Performed by: PHYSICIAN ASSISTANT

## 2022-07-01 RX ADMIN — CYANOCOBALAMIN 1000 MCG: 1000 INJECTION, SOLUTION INTRAMUSCULAR; SUBCUTANEOUS at 14:13

## 2022-07-06 ENCOUNTER — OFFICE VISIT (OUTPATIENT)
Dept: CARDIOLOGY | Facility: CLINIC | Age: 77
End: 2022-07-06

## 2022-07-06 ENCOUNTER — TELEPHONE (OUTPATIENT)
Dept: CARDIOLOGY | Facility: CLINIC | Age: 77
End: 2022-07-06

## 2022-07-06 VITALS
HEIGHT: 66 IN | RESPIRATION RATE: 16 BRPM | SYSTOLIC BLOOD PRESSURE: 104 MMHG | OXYGEN SATURATION: 93 % | HEART RATE: 130 BPM | WEIGHT: 134 LBS | BODY MASS INDEX: 21.53 KG/M2 | DIASTOLIC BLOOD PRESSURE: 66 MMHG | TEMPERATURE: 98 F

## 2022-07-06 DIAGNOSIS — I95.1 AUTONOMIC ORTHOSTATIC HYPOTENSION: ICD-10-CM

## 2022-07-06 DIAGNOSIS — R00.2 PALPITATIONS: ICD-10-CM

## 2022-07-06 DIAGNOSIS — I48.0 PAROXYSMAL ATRIAL FIBRILLATION: Primary | ICD-10-CM

## 2022-07-06 PROCEDURE — 99214 OFFICE O/P EST MOD 30 MIN: CPT | Performed by: INTERNAL MEDICINE

## 2022-07-06 PROCEDURE — 93000 ELECTROCARDIOGRAM COMPLETE: CPT | Performed by: INTERNAL MEDICINE

## 2022-07-06 RX ORDER — METOPROLOL SUCCINATE 50 MG/1
50 TABLET, EXTENDED RELEASE ORAL DAILY
Qty: 90 TABLET | Refills: 3 | Status: SHIPPED | OUTPATIENT
Start: 2022-07-06 | End: 2022-10-27 | Stop reason: SDUPTHER

## 2022-07-06 NOTE — PROGRESS NOTES
[facility] [place]  [facility] CARDIOLOGY  [address] [place] [address]  Dept: [phone]  Dept Fax: [phone]    Nesha Bacon  1945    Follow Up Office Visit Note    History of Present Illness:  Nesha Bacon is a 77 y.o. female who presents to the clinic for Follow-up and Palpitations. _ She has been having some palpitations for the last 3 days with some SOB, She denies dizziness or passing out, no chest pain, no edema, unfortunately she is back to AF and has not been taking the blood thinnner, , her Bp is 140.80, will restart xarelto 20 mg daily, will increase Toprol xl to 50 mg daily will come back next week,   And then we might do cardioversion, I was planning to do this week a LACEY with cardioversion but she does not want to have it, so will need 2 weeks blood thinner before blind shock,    The following portions of the patient's history were reviewed and updated as appropriate: allergies, current medications, past family history, past medical history, past social history, past surgical history and problem list.    Medications:  cephalexin  cyanocobalamin  fludrocortisone  metoprolol succinate XL  rivaroxaban  triamcinolone    Subjective  Allergies   Allergen Reactions   • Aspirin Palpitations     Chest tightness   • Latex Rash        Past Medical History:   Diagnosis Date   • Acute cystitis without hematuria    • Age-related osteoporosis without current pathological fracture    • Back pain    • BMI 20.0-20.9, adult    • Bone injury     BONE OR JOINT INJURIES   • Chronic fatigue    • Chronic obstructive pulmonary disease (HCC)    • Closed fracture of left hip, with routine healing, subsequent encounter    • Lightheadedness     CHRONIC   • Paroxysmal atrial fibrillation (HCC)    • Postoperative anemia    • Pure hypercholesterolemia    • Renal cancer (HCC)    • Shortness of breath        Past Surgical History:   Procedure Laterality Date   • CATARACT  "EXTRACTION, BILATERAL     • CHOLECYSTECTOMY     • HIP FRACTURE SURGERY     • LUMBAR LAMINECTOMY     • NEPHRECTOMY PARTIAL     • TUBAL ABDOMINAL LIGATION     • VAGINAL HYSTERECTOMY SALPINGO OOPHORECTOMY WITH ANTERIOR AND POSTERIOR VAGINAL REPAIR         Family History   Problem Relation Age of Onset   • Stroke Mother    • Hypertension Mother    • Coronary artery disease Brother         Social History     Socioeconomic History   • Marital status: Single   Tobacco Use   • Smoking status: Never Smoker   • Smokeless tobacco: Never Used   Vaping Use   • Vaping Use: Never used   Substance and Sexual Activity   • Alcohol use: Not Currently   • Drug use: Never   • Sexual activity: Defer       Review of Systems   Constitutional: Negative.    HENT: Negative.    Respiratory: Positive for shortness of breath.    Cardiovascular: Positive for palpitations.   Endocrine: Negative.    Genitourinary: Negative.    Musculoskeletal: Negative.    Skin: Negative.    Allergic/Immunologic: Negative.    Neurological: Negative.    Hematological: Negative.    Psychiatric/Behavioral: Negative.        Cardiovascular Procedures    ECHO/MUGA:   STRESS TESTS:   CARDIAC CATH:   DEVICES:   HOLTER:   CT/MRI:   VASCULAR:   CARDIOTHORACIC:     Objective  Vitals:    07/06/22 1113   BP: 104/66   BP Location: Left arm   Patient Position: Sitting   Cuff Size: Adult   Pulse: (!) 130   Resp: 16   Temp: 98 °F (36.7 °C)   TempSrc: Infrared   SpO2: 93%   Weight: 60.8 kg (134 lb)   Height: 167.6 cm (66\")   PainSc: 0-No pain     Body mass index is 21.63 kg/m².     Physical Exam  Constitutional:       Appearance: Healthy appearance. Not in distress.   Neck:      Vascular: No JVR. JVD normal.   Pulmonary:      Effort: Pulmonary effort is normal.      Breath sounds: Normal breath sounds. No wheezing. No rhonchi. No rales.   Chest:      Chest wall: Not tender to palpatation.   Cardiovascular:      PMI at left midclavicular line. Normal rate. Irregularly irregular " rhythm. Normal S1. Normal S2.      Murmurs: There is no murmur.      No gallop. No click. No rub.   Pulses:     Intact distal pulses.   Edema:     Peripheral edema absent.   Abdominal:      General: Bowel sounds are normal.      Palpations: Abdomen is soft.      Tenderness: There is no abdominal tenderness.   Musculoskeletal: Normal range of motion.         General: No tenderness. Skin:     General: Skin is warm and dry.   Neurological:      General: No focal deficit present.      Mental Status: Alert and oriented to person, place and time.          Diagnostic Data    ECG 12 Lead    Date/Time: 7/6/2022 12:51 PM  Performed by: Adiel Kang MD  Authorized by: Adiel Kang MD   Comparison: compared with previous ECG from 12/7/2021  Rhythm: atrial fibrillation  Rate: tachycardic  BPM: 108  QRS axis: normal  Other findings: non-specific ST-T wave changes    Clinical impression: abnormal EKG            Assessment and Plan  Diagnoses and all orders for this visit:    Paroxysmal atrial fibrillation (HCC)- She is back to AF was just Toprol xl 25mg, she has not been taking the blood thinner, will restart Xarelto 20 mg., will also Increase Toprol xl to 50 mg, will need AA after or before the cardioversion, will see her back in 1 week    Autonomic orthostatic hypotension- Better on fludrocortisone 0.1 daily, no more dizziness    Palpitations- As above now on AF  -     Discontinue: rivaroxaban (Xarelto) 20 MG tablet; Take 1 tablet by mouth Daily.    Other orders  -     metoprolol succinate XL (TOPROL-XL) 50 MG 24 hr tablet; Take 1 tablet by mouth Daily.         Return in about 1 week (around 7/13/2022) for Recheck.    Adiel Kang MD  07/06/2022

## 2022-07-08 ENCOUNTER — TELEPHONE (OUTPATIENT)
Dept: CARDIOLOGY | Facility: CLINIC | Age: 77
End: 2022-07-08

## 2022-07-08 ENCOUNTER — CLINICAL SUPPORT (OUTPATIENT)
Dept: FAMILY MEDICINE CLINIC | Facility: CLINIC | Age: 77
End: 2022-07-08

## 2022-07-08 DIAGNOSIS — E53.8 VITAMIN B12 DEFICIENCY: Primary | ICD-10-CM

## 2022-07-08 NOTE — TELEPHONE ENCOUNTER
Spoke with MsKenyatta Jordi and advised her that her Cardioversion is scheduled for 7/20/22, arrive at 7am front lobby at [facility] to register. You will be sedated so need a . Please continue to take you medications.   You also need to come next week to office 7/13 to see Dr. Kang and he will start you on an antiarrythmic and get an EKG. Pt verbalized understanding.

## 2022-07-11 ENCOUNTER — CLINICAL SUPPORT (OUTPATIENT)
Dept: FAMILY MEDICINE CLINIC | Facility: CLINIC | Age: 77
End: 2022-07-11

## 2022-07-11 DIAGNOSIS — E53.8 VITAMIN B12 DEFICIENCY: Primary | ICD-10-CM

## 2022-07-13 ENCOUNTER — TELEPHONE (OUTPATIENT)
Dept: CARDIOLOGY | Facility: CLINIC | Age: 77
End: 2022-07-13

## 2022-07-13 ENCOUNTER — OFFICE VISIT (OUTPATIENT)
Dept: CARDIOLOGY | Facility: CLINIC | Age: 77
End: 2022-07-13

## 2022-07-13 VITALS
RESPIRATION RATE: 18 BRPM | TEMPERATURE: 98 F | BODY MASS INDEX: 21.69 KG/M2 | HEIGHT: 66 IN | DIASTOLIC BLOOD PRESSURE: 84 MMHG | SYSTOLIC BLOOD PRESSURE: 148 MMHG | HEART RATE: 65 BPM | OXYGEN SATURATION: 99 % | WEIGHT: 135 LBS

## 2022-07-13 DIAGNOSIS — I95.1 AUTONOMIC ORTHOSTATIC HYPOTENSION: ICD-10-CM

## 2022-07-13 DIAGNOSIS — I48.0 PAROXYSMAL ATRIAL FIBRILLATION: Primary | ICD-10-CM

## 2022-07-13 PROBLEM — E78.2 MIXED HYPERLIPIDEMIA: Status: RESOLVED | Noted: 2022-06-24 | Resolved: 2022-07-13

## 2022-07-13 PROCEDURE — 93000 ELECTROCARDIOGRAM COMPLETE: CPT | Performed by: INTERNAL MEDICINE

## 2022-07-13 PROCEDURE — 99214 OFFICE O/P EST MOD 30 MIN: CPT | Performed by: INTERNAL MEDICINE

## 2022-07-13 RX ORDER — FLECAINIDE ACETATE 50 MG/1
50 TABLET ORAL 2 TIMES DAILY
Qty: 180 TABLET | Refills: 3 | Status: SHIPPED | OUTPATIENT
Start: 2022-07-13 | End: 2022-07-27 | Stop reason: SDUPTHER

## 2022-07-13 NOTE — TELEPHONE ENCOUNTER
Patient called back and understands her cardioversion has been moved to 7/21, and she will come in 7/20 for EKG. Per Dr. Kang, she still needs to keep her appointment with him today.

## 2022-07-13 NOTE — TELEPHONE ENCOUNTER
Left a message for Ms. Bacon to call back and talk with Jayne in reference to your Cardioversion.  Thank you.

## 2022-07-13 NOTE — PROGRESS NOTES
[facility] [place]  [facility] CARDIOLOGY  [address] [place] [address]  Dept: [phone]  Dept Fax: [phone]    Nesha Bacon  1945    Follow Up Office Visit Note    History of Present Illness:  Nesha Bacon is a 77 y.o. female who presents to the clinic for PAF- she is still having palpitations, but she is back to sinus on Toprol xl 50 mg and also Xarelto 20 mg, Hr is 65., will add Tambocor 50 mg bid,     The following portions of the patient's history were reviewed and updated as appropriate: allergies, current medications, past family history, past medical history, past social history, past surgical history and problem list.    Medications:  cyanocobalamin  fludrocortisone  metoprolol succinate XL  rivaroxaban  triamcinolone    Subjective  Allergies   Allergen Reactions   • Aspirin Palpitations     Chest tightness   • Latex Rash        Past Medical History:   Diagnosis Date   • Acute cystitis without hematuria    • Age-related osteoporosis without current pathological fracture    • Back pain    • BMI 20.0-20.9, adult    • Bone injury     BONE OR JOINT INJURIES   • Chronic fatigue    • Chronic obstructive pulmonary disease (HCC)    • Closed fracture of left hip, with routine healing, subsequent encounter    • Lightheadedness     CHRONIC   • Paroxysmal atrial fibrillation (HCC)    • Postoperative anemia    • Pure hypercholesterolemia    • Renal cancer (HCC)    • Shortness of breath        Past Surgical History:   Procedure Laterality Date   • CATARACT EXTRACTION, BILATERAL     • CHOLECYSTECTOMY     • HIP FRACTURE SURGERY     • LUMBAR LAMINECTOMY     • NEPHRECTOMY PARTIAL     • TUBAL ABDOMINAL LIGATION     • VAGINAL HYSTERECTOMY SALPINGO OOPHORECTOMY WITH ANTERIOR AND POSTERIOR VAGINAL REPAIR         Family History   Problem Relation Age of Onset   • Stroke Mother    • Hypertension Mother    • Coronary artery disease Brother         Social History  "    Socioeconomic History   • Marital status: Single   Tobacco Use   • Smoking status: Never Smoker   • Smokeless tobacco: Never Used   Vaping Use   • Vaping Use: Never used   Substance and Sexual Activity   • Alcohol use: Not Currently   • Drug use: Never   • Sexual activity: Defer       Review of Systems   Constitutional: Negative.    HENT: Negative.    Respiratory: Negative.    Cardiovascular: Negative.    Endocrine: Negative.    Genitourinary: Negative.    Musculoskeletal: Negative.    Skin: Negative.    Allergic/Immunologic: Negative.    Neurological: Negative.    Hematological: Negative.    Psychiatric/Behavioral: Negative.        Cardiovascular Procedures    ECHO/MUGA:   STRESS TESTS:   CARDIAC CATH:   DEVICES:   HOLTER:   CT/MRI:   VASCULAR:   CARDIOTHORACIC:     Objective  Vitals:    07/13/22 1333   BP: 148/84   BP Location: Left arm   Patient Position: Lying   Cuff Size: Adult   Pulse: 65   Resp: 18   Temp: 98 °F (36.7 °C)   TempSrc: Infrared   SpO2: 99%   Weight: 61.2 kg (135 lb)   Height: 167.6 cm (66\")   PainSc: 0-No pain     Body mass index is 21.79 kg/m².     Physical Exam  Constitutional:       Appearance: Healthy appearance. Not in distress.   Neck:      Vascular: No JVR. JVD normal.   Pulmonary:      Effort: Pulmonary effort is normal.      Breath sounds: Normal breath sounds. No wheezing. No rhonchi. No rales.   Chest:      Chest wall: Not tender to palpatation.   Cardiovascular:      PMI at left midclavicular line. Normal rate. Regular rhythm. Normal S1. Normal S2.      Murmurs: There is no murmur.      No gallop. No click. No rub.   Pulses:     Intact distal pulses.   Edema:     Peripheral edema absent.   Abdominal:      General: Bowel sounds are normal.      Palpations: Abdomen is soft.      Tenderness: There is no abdominal tenderness.   Musculoskeletal: Normal range of motion.         General: No tenderness. Skin:     General: Skin is warm and dry.   Neurological:      General: No focal " deficit present.      Mental Status: Alert and oriented to person, place and time.          Diagnostic Data    ECG 12 Lead    Date/Time: 7/13/2022 2:06 PM  Performed by: Adiel Kang MD  Authorized by: Adiel Kang MD   Comparison: compared with previous ECG from 7/6/2022  Comparison to previous ECG: Now is back to sinus rhythm  Rhythm: sinus rhythm  Ectopy: atrial premature contractions  Rate: normal  BPM: 65  QRS axis: normal    Clinical impression: abnormal EKG            Assessment and Plan  Diagnoses and all orders for this visit:    Paroxysmal atrial fibrillation (HCC)-She is back to sinus on Toprol xl 50 mg and Xarelto, will add Flecainide 50 mg bid,     Autonomic orthostatic hypotension- Doing better asymptomatic, on Fludrocortisone 0,1 daily no more dizziness         No follow-ups on file.    Adiel Kang MD  07/13/2022

## 2022-07-27 ENCOUNTER — OFFICE VISIT (OUTPATIENT)
Dept: CARDIOLOGY | Facility: CLINIC | Age: 77
End: 2022-07-27

## 2022-07-27 VITALS
BODY MASS INDEX: 21.63 KG/M2 | SYSTOLIC BLOOD PRESSURE: 132 MMHG | TEMPERATURE: 97.3 F | HEIGHT: 66 IN | RESPIRATION RATE: 20 BRPM | WEIGHT: 134.6 LBS | DIASTOLIC BLOOD PRESSURE: 72 MMHG | OXYGEN SATURATION: 98 % | HEART RATE: 71 BPM

## 2022-07-27 DIAGNOSIS — J44.1 COPD WITH EXACERBATION: ICD-10-CM

## 2022-07-27 DIAGNOSIS — I95.1 AUTONOMIC ORTHOSTATIC HYPOTENSION: ICD-10-CM

## 2022-07-27 DIAGNOSIS — I48.0 PAROXYSMAL ATRIAL FIBRILLATION: Primary | ICD-10-CM

## 2022-07-27 PROCEDURE — 93000 ELECTROCARDIOGRAM COMPLETE: CPT | Performed by: INTERNAL MEDICINE

## 2022-07-27 PROCEDURE — 99214 OFFICE O/P EST MOD 30 MIN: CPT | Performed by: INTERNAL MEDICINE

## 2022-07-27 RX ORDER — FLECAINIDE ACETATE 100 MG/1
100 TABLET ORAL 2 TIMES DAILY
Qty: 180 TABLET | Refills: 3 | Status: SHIPPED | OUTPATIENT
Start: 2022-07-27 | End: 2022-10-27 | Stop reason: SDUPTHER

## 2022-07-27 NOTE — PROGRESS NOTES
[facility] [place]  [facility] CARDIOLOGY  [address] [place] [address]  Dept: [phone]  Dept Fax: [phone]    Nesha Bacon  1945    Follow Up Office Visit Note    History of Present Illness:  Nesha Bacon is a 77 y.o. female who presents to the clinic for Follow-up. PAf- She is doing better, still has some fluttering mostly at night, will increase Flecainide to 100 mg bid , keep Xarelto 20 mg- EKg sinus HR60    The following portions of the patient's history were reviewed and updated as appropriate: allergies, current medications, past family history, past medical history, past social history, past surgical history and problem list.    Medications:  cyanocobalamin  flecainide  fludrocortisone  metoprolol succinate XL  rivaroxaban  triamcinolone    Subjective  Allergies   Allergen Reactions   • Aspirin Palpitations     Chest tightness   • Latex Rash        Past Medical History:   Diagnosis Date   • Acute cystitis without hematuria    • Age-related osteoporosis without current pathological fracture    • Back pain    • BMI 20.0-20.9, adult    • Bone injury     BONE OR JOINT INJURIES   • Chronic fatigue    • Chronic obstructive pulmonary disease (HCC)    • Closed fracture of left hip, with routine healing, subsequent encounter    • Lightheadedness     CHRONIC   • Paroxysmal atrial fibrillation (HCC)    • Postoperative anemia    • Pure hypercholesterolemia    • Renal cancer (HCC)    • Shortness of breath        Past Surgical History:   Procedure Laterality Date   • CATARACT EXTRACTION, BILATERAL     • CHOLECYSTECTOMY     • HIP FRACTURE SURGERY     • LUMBAR LAMINECTOMY     • NEPHRECTOMY PARTIAL     • TUBAL ABDOMINAL LIGATION     • VAGINAL HYSTERECTOMY SALPINGO OOPHORECTOMY WITH ANTERIOR AND POSTERIOR VAGINAL REPAIR         Family History   Problem Relation Age of Onset   • Stroke Mother    • Hypertension Mother    • Coronary artery disease Brother         Social  "History     Socioeconomic History   • Marital status: Single   Tobacco Use   • Smoking status: Never Smoker   • Smokeless tobacco: Never Used   Vaping Use   • Vaping Use: Never used   Substance and Sexual Activity   • Alcohol use: Not Currently   • Drug use: Never   • Sexual activity: Defer       Review of Systems   Constitutional: Negative.    HENT: Negative.    Respiratory: Negative.    Cardiovascular: Negative.    Endocrine: Negative.    Genitourinary: Negative.    Musculoskeletal: Negative.    Skin: Negative.    Allergic/Immunologic: Negative.    Neurological: Negative.    Hematological: Negative.    Psychiatric/Behavioral: Negative.        Cardiovascular Procedures    ECHO/MUGA:   STRESS TESTS:   CARDIAC CATH:   DEVICES:   HOLTER:   CT/MRI:   VASCULAR:   CARDIOTHORACIC:     Objective  Vitals:    07/27/22 1346   BP: 132/72   BP Location: Left arm   Patient Position: Lying   Cuff Size: Adult   Pulse: 71   Resp: 20   Temp: 97.3 °F (36.3 °C)   TempSrc: Temporal   SpO2: 98%   Weight: 61.1 kg (134 lb 9.6 oz)   Height: 167.6 cm (66\")   PainSc: 0-No pain     Body mass index is 21.73 kg/m².     Physical Exam  Constitutional:       Appearance: Healthy appearance. Not in distress.   Neck:      Vascular: No JVR. JVD normal.   Pulmonary:      Effort: Pulmonary effort is normal.      Breath sounds: Normal breath sounds. No wheezing. No rhonchi. No rales.   Chest:      Chest wall: Not tender to palpatation.   Cardiovascular:      PMI at left midclavicular line. Normal rate. Regular rhythm. Normal S1. Normal S2.      Murmurs: There is no murmur.      No gallop. No click. No rub.   Pulses:     Intact distal pulses.   Edema:     Peripheral edema absent.   Abdominal:      General: Bowel sounds are normal.      Palpations: Abdomen is soft.      Tenderness: There is no abdominal tenderness.   Musculoskeletal: Normal range of motion.         General: No tenderness. Skin:     General: Skin is warm and dry.   Neurological:      " General: No focal deficit present.      Mental Status: Alert and oriented to person, place and time.          Diagnostic Data    ECG 12 Lead    Date/Time: 7/27/2022 2:25 PM  Performed by: Adiel Kang MD  Authorized by: Adiel Kang MD   Comparison: compared with previous ECG from 7/13/2022  Similar to previous ECG  Rhythm: sinus rhythm  Rate: normal  BPM: 60  QRS axis: normal  Other findings: non-specific ST-T wave changes and poor R wave progression    Clinical impression: abnormal EKG            Assessment and Plan  Diagnoses and all orders for this visit:    Paroxysmal atrial fibrillation (HCC)-- Doing better, still has some fluttering, will increase the Flecainide to 100 mg bid . Keep Xarelto 20 mg and also Toprol xl 25 mg    Autonomic orthostatic hypotension- better on Fludrocortisone 0,1 daily BP is 110.60 sitting and standing 100.60 no dizziness., will keep same meds    COPD with exacerbation (HCC)    Other orders  -     flecainide (TAMBOCOR) 100 MG tablet; Take 1 tablet by mouth 2 (Two) Times a Day.         No follow-ups on file.    Adiel Kang MD  07/27/2022

## 2022-08-24 ENCOUNTER — OFFICE VISIT (OUTPATIENT)
Dept: FAMILY MEDICINE CLINIC | Facility: CLINIC | Age: 77
End: 2022-08-24

## 2022-08-24 VITALS
HEART RATE: 71 BPM | WEIGHT: 133.3 LBS | SYSTOLIC BLOOD PRESSURE: 122 MMHG | DIASTOLIC BLOOD PRESSURE: 80 MMHG | OXYGEN SATURATION: 97 % | HEIGHT: 66 IN | BODY MASS INDEX: 21.42 KG/M2

## 2022-08-24 DIAGNOSIS — Z00.00 ENCOUNTER FOR SUBSEQUENT ANNUAL WELLNESS VISIT (AWV) IN MEDICARE PATIENT: Primary | ICD-10-CM

## 2022-08-24 DIAGNOSIS — J41.0 SIMPLE CHRONIC BRONCHITIS: ICD-10-CM

## 2022-08-24 DIAGNOSIS — R07.89 CHEST PAIN, ATYPICAL: ICD-10-CM

## 2022-08-24 DIAGNOSIS — R07.89 ATYPICAL CHEST PAIN: ICD-10-CM

## 2022-08-24 DIAGNOSIS — G25.81 RESTLESS LEG SYNDROME: ICD-10-CM

## 2022-08-24 DIAGNOSIS — Z00.00 ENCOUNTER FOR ROUTINE ADULT MEDICAL EXAMINATION: Primary | ICD-10-CM

## 2022-08-24 DIAGNOSIS — M81.0 AGE-RELATED OSTEOPOROSIS WITHOUT CURRENT PATHOLOGICAL FRACTURE: ICD-10-CM

## 2022-08-24 DIAGNOSIS — E53.8 VITAMIN B12 DEFICIENCY: ICD-10-CM

## 2022-08-24 DIAGNOSIS — I48.0 PAROXYSMAL ATRIAL FIBRILLATION: ICD-10-CM

## 2022-08-24 PROCEDURE — 36415 COLL VENOUS BLD VENIPUNCTURE: CPT | Performed by: PHYSICIAN ASSISTANT

## 2022-08-24 PROCEDURE — 99397 PER PM REEVAL EST PAT 65+ YR: CPT | Performed by: PHYSICIAN ASSISTANT

## 2022-08-24 PROCEDURE — G0402 INITIAL PREVENTIVE EXAM: HCPCS | Performed by: PHYSICIAN ASSISTANT

## 2022-08-24 PROCEDURE — 1170F FXNL STATUS ASSESSED: CPT | Performed by: PHYSICIAN ASSISTANT

## 2022-08-24 PROCEDURE — 1159F MED LIST DOCD IN RCRD: CPT | Performed by: PHYSICIAN ASSISTANT

## 2022-08-24 RX ORDER — PRAMIPEXOLE DIHYDROCHLORIDE 0.12 MG/1
0.12 TABLET ORAL NIGHTLY
Qty: 30 TABLET | Refills: 5 | Status: SHIPPED | OUTPATIENT
Start: 2022-08-24 | End: 2023-02-24 | Stop reason: SDUPTHER

## 2022-08-24 NOTE — ASSESSMENT & PLAN NOTE
Discussed injury prevention, diet and exercise, safe sexual practices, and screening for common diseases. Encouraged use of sunscreen and seatbelts. Encouraged SBE, avoidance of tobacco, limiting alcohol, and yearly dental and eye exams.

## 2022-08-24 NOTE — ASSESSMENT & PLAN NOTE
She has chronic atypical chest pains that have remained stable and have been evaluated by cardiology she has not had the pains lately.  Routine screening blood work is obtained and further recommendations will depend upon those results.

## 2022-08-24 NOTE — PROGRESS NOTES
"Chief Complaint  Annual Exam (Patient is fasting today.)    Subjective          Nesha Bacon presents to [facility] PRIMARY CARE  History of Present Illness patient is here today for an annual exam and to get blood work.  She did not need her medications refilled because her cardiologist refills on her meds.  Her only complaint today was that she often wakes up in the [place] with leg tingling and pain.    Objective   Vital Signs:   /80 (BP Location: Right arm)   Pulse 71   Ht 167.6 cm (66\")   Wt 60.5 kg (133 lb 4.8 oz)   SpO2 97%   BMI 21.52 kg/m²     Body mass index is 21.52 kg/m².    Review of Systems   Constitutional: Negative.  Negative for chills, fatigue and fever.   HENT: Negative.    Eyes: Negative.    Respiratory: Negative.  Negative for cough and shortness of breath.    Cardiovascular: Negative.  Negative for chest pain and palpitations.   Gastrointestinal: Negative.    Endocrine: Negative.    Genitourinary: Negative.    Musculoskeletal: Negative for back pain and myalgias.   Skin: Negative.    Allergic/Immunologic: Negative.    Neurological: Negative.  Negative for dizziness and headache.   Psychiatric/Behavioral: Negative.  Negative for depressed mood. The patient is not nervous/anxious.        Past History:  Medical History: has a past medical history of Acute cystitis without hematuria, Age-related osteoporosis without current pathological fracture, Back pain, BMI 20.0-20.9, adult, Bone injury, Chronic fatigue, Chronic obstructive pulmonary disease (HCC), Closed fracture of left hip, with routine healing, subsequent encounter, Lightheadedness, Paroxysmal atrial fibrillation (HCC), Postoperative anemia, Pure hypercholesterolemia, Renal cancer (HCC), and Shortness of breath.   Surgical History: has a past surgical history that includes Partial nephrectomy; Lumbar laminectomy; vaginal hysterectomy salpingo oophorectomy with anterior and posterior vaginal repair; " Cataract extraction, bilateral; Cholecystectomy; Tubal ligation; and Hip fracture surgery.   Family History: family history includes Coronary artery disease in her brother; Hypertension in her mother; Stroke in her mother.   Social History: reports that she has never smoked. She has never used smokeless tobacco. She reports previous alcohol use. She reports that she does not use drugs.      Current Outpatient Medications:   •  flecainide (TAMBOCOR) 100 MG tablet, Take 1 tablet by mouth 2 (Two) Times a Day., Disp: 180 tablet, Rfl: 3  •  fludrocortisone 0.1 MG tablet, Take 1 tablet by mouth Daily., Disp: 90 tablet, Rfl: 2  •  metoprolol succinate XL (TOPROL-XL) 50 MG 24 hr tablet, Take 1 tablet by mouth Daily., Disp: 90 tablet, Rfl: 3  •  rivaroxaban (Xarelto) 20 MG tablet, Take 1 tablet by mouth Daily., Disp: 35 tablet, Rfl: 0  •  triamcinolone (KENALOG) 0.1 % ointment, Apply 1 application topically to the appropriate area as directed 2 (Two) Times a Day., Disp: 30 g, Rfl: 3  •  pramipexole (MIRAPEX) 0.125 MG tablet, Take 1 tablet by mouth Every Night., Disp: 30 tablet, Rfl: 5    Allergies: Aspirin and Latex    Physical Exam  Vitals reviewed.   HENT:      Head: Normocephalic.      Nose: Nose normal.      Mouth/Throat:      Mouth: Mucous membranes are moist.   Eyes:      Pupils: Pupils are equal, round, and reactive to light.   Cardiovascular:      Rate and Rhythm: Normal rate and regular rhythm.      Pulses: Normal pulses.      Heart sounds: Normal heart sounds.   Pulmonary:      Effort: Pulmonary effort is normal.      Breath sounds: Normal breath sounds.   Abdominal:      General: Abdomen is flat. Bowel sounds are normal.      Palpations: Abdomen is soft.   Musculoskeletal:         General: Normal range of motion.      Cervical back: Normal range of motion and neck supple.   Skin:     General: Skin is warm and dry.      Capillary Refill: Capillary refill takes less than 2 seconds.   Neurological:      General: No  focal deficit present.      Mental Status: She is alert and oriented to person, place, and time.   Psychiatric:         Mood and Affect: Mood normal.          Result Review :                   Assessment and Plan    Diagnoses and all orders for this visit:    1. Encounter for routine adult medical examination (Primary)  Assessment & Plan:  Discussed injury prevention, diet and exercise, safe sexual practices, and screening for common diseases. Encouraged use of sunscreen and seatbelts. Encouraged SBE, avoidance of tobacco, limiting alcohol, and yearly dental and eye exams.       2. Paroxysmal atrial fibrillation (HCC)  Assessment & Plan:  This remains stable and is followed by cardiology we will get some routine screening blood work.     Orders:  -     CBC & Differential; Future  -     Comprehensive Metabolic Panel; Future  -     Lipid Panel; Future  -     TSH; Future  -     CBC & Differential  -     Comprehensive Metabolic Panel  -     Lipid Panel  -     TSH    3. Vitamin B12 deficiency  Assessment & Plan:  Routine screening labs ordered. Further recommendations will depend upon those results.     Orders:  -     Vitamin B12; Future  -     Vitamin B12    4. Simple chronic bronchitis (HCC)  Assessment & Plan:  This remained stable with no signs of exacerbation.      5. Restless leg syndrome  Assessment & Plan:  I am going to start her on low-dose Mirapex and see if that improves this.      6. Age-related osteoporosis without current pathological fracture  Assessment & Plan:  She agreed to get a DEXA scan done and we will get that arranged to see where she stands now.    Orders:  -     DEXA Bone Density Axial; Future    7. Chest pain, atypical  Assessment & Plan:  She has chronic atypical chest pains that have remained stable and have been evaluated by cardiology she has not had the pains lately.  Routine screening blood work is obtained and further recommendations will depend upon those results.      8. Atypical  chest pain   Assessment & Plan:  She has chronic atypical chest pains that have remained stable and have been evaluated by cardiology she has not had the pains lately.  Routine screening blood work is obtained and further recommendations will depend upon those results.    Orders:  -     Lipid Panel; Future  -     Lipid Panel    Other orders  -     Pneumococcal Conjugate Vaccine 20-Valent All  -     pramipexole (MIRAPEX) 0.125 MG tablet; Take 1 tablet by mouth Every Night.  Dispense: 30 tablet; Refill: 5      Follow Up   Return in about 6 months (around 2/24/2023) for Recheck.  Patient was given instructions and counseling regarding her condition or for health maintenance advice. Please see specific information pulled into the AVS if appropriate.     Julia Foote PA-C

## 2022-08-25 LAB
ALBUMIN SERPL-MCNC: 4.3 G/DL (ref 3.7–4.7)
ALBUMIN/GLOB SERPL: 1.6 {RATIO} (ref 1.2–2.2)
ALP SERPL-CCNC: 86 IU/L (ref 44–121)
ALT SERPL-CCNC: 8 IU/L (ref 0–32)
AST SERPL-CCNC: 17 IU/L (ref 0–40)
BASOPHILS # BLD AUTO: 0.1 X10E3/UL (ref 0–0.2)
BASOPHILS NFR BLD AUTO: 1 %
BILIRUB SERPL-MCNC: 0.8 MG/DL (ref 0–1.2)
BUN SERPL-MCNC: 16 MG/DL (ref 8–27)
BUN/CREAT SERPL: 23 (ref 12–28)
CALCIUM SERPL-MCNC: 9.4 MG/DL (ref 8.7–10.3)
CHLORIDE SERPL-SCNC: 105 MMOL/L (ref 96–106)
CHOLEST SERPL-MCNC: 217 MG/DL (ref 100–199)
CO2 SERPL-SCNC: 27 MMOL/L (ref 20–29)
CREAT SERPL-MCNC: 0.7 MG/DL (ref 0.57–1)
EGFRCR-CYS SERPLBLD CKD-EPI 2021: 89 ML/MIN/1.73
EOSINOPHIL # BLD AUTO: 0.1 X10E3/UL (ref 0–0.4)
EOSINOPHIL NFR BLD AUTO: 2 %
ERYTHROCYTE [DISTWIDTH] IN BLOOD BY AUTOMATED COUNT: 13.3 % (ref 11.7–15.4)
GLOBULIN SER CALC-MCNC: 2.7 G/DL (ref 1.5–4.5)
GLUCOSE SERPL-MCNC: 90 MG/DL (ref 65–99)
HCT VFR BLD AUTO: 40.8 % (ref 34–46.6)
HDLC SERPL-MCNC: 50 MG/DL
HGB BLD-MCNC: 13.2 G/DL (ref 11.1–15.9)
IMM GRANULOCYTES # BLD AUTO: 0 X10E3/UL (ref 0–0.1)
IMM GRANULOCYTES NFR BLD AUTO: 0 %
LDLC SERPL CALC-MCNC: 139 MG/DL (ref 0–99)
LYMPHOCYTES # BLD AUTO: 2 X10E3/UL (ref 0.7–3.1)
LYMPHOCYTES NFR BLD AUTO: 34 %
MCH RBC QN AUTO: 31.2 PG (ref 26.6–33)
MCHC RBC AUTO-ENTMCNC: 32.4 G/DL (ref 31.5–35.7)
MCV RBC AUTO: 97 FL (ref 79–97)
MONOCYTES # BLD AUTO: 0.4 X10E3/UL (ref 0.1–0.9)
MONOCYTES NFR BLD AUTO: 7 %
NEUTROPHILS # BLD AUTO: 3.3 X10E3/UL (ref 1.4–7)
NEUTROPHILS NFR BLD AUTO: 56 %
PLATELET # BLD AUTO: 326 X10E3/UL (ref 150–450)
POTASSIUM SERPL-SCNC: 3.1 MMOL/L (ref 3.5–5.2)
PROT SERPL-MCNC: 7 G/DL (ref 6–8.5)
RBC # BLD AUTO: 4.23 X10E6/UL (ref 3.77–5.28)
SODIUM SERPL-SCNC: 147 MMOL/L (ref 134–144)
TRIGL SERPL-MCNC: 156 MG/DL (ref 0–149)
TSH SERPL DL<=0.005 MIU/L-ACNC: 2.4 UIU/ML (ref 0.45–4.5)
VIT B12 SERPL-MCNC: 1043 PG/ML (ref 232–1245)
VLDLC SERPL CALC-MCNC: 28 MG/DL (ref 5–40)
WBC # BLD AUTO: 5.9 X10E3/UL (ref 3.4–10.8)

## 2022-08-29 RX ORDER — POTASSIUM CHLORIDE 750 MG/1
10 CAPSULE, EXTENDED RELEASE ORAL 2 TIMES DAILY
Qty: 60 CAPSULE | Refills: 5 | Status: SHIPPED | OUTPATIENT
Start: 2022-08-29 | End: 2023-02-24

## 2022-10-27 ENCOUNTER — OFFICE VISIT (OUTPATIENT)
Dept: CARDIOLOGY | Facility: CLINIC | Age: 77
End: 2022-10-27

## 2022-10-27 VITALS
HEIGHT: 66 IN | BODY MASS INDEX: 21.69 KG/M2 | HEART RATE: 55 BPM | WEIGHT: 135 LBS | TEMPERATURE: 96.6 F | SYSTOLIC BLOOD PRESSURE: 124 MMHG | OXYGEN SATURATION: 98 % | RESPIRATION RATE: 18 BRPM | DIASTOLIC BLOOD PRESSURE: 72 MMHG

## 2022-10-27 DIAGNOSIS — I95.1 AUTONOMIC ORTHOSTATIC HYPOTENSION: Primary | ICD-10-CM

## 2022-10-27 DIAGNOSIS — I48.0 PAROXYSMAL ATRIAL FIBRILLATION: ICD-10-CM

## 2022-10-27 PROCEDURE — 99214 OFFICE O/P EST MOD 30 MIN: CPT | Performed by: INTERNAL MEDICINE

## 2022-10-27 PROCEDURE — 93000 ELECTROCARDIOGRAM COMPLETE: CPT | Performed by: INTERNAL MEDICINE

## 2022-10-27 RX ORDER — FLECAINIDE ACETATE 100 MG/1
100 TABLET ORAL 2 TIMES DAILY
Qty: 180 TABLET | Refills: 3 | Status: SHIPPED | OUTPATIENT
Start: 2022-10-27

## 2022-10-27 RX ORDER — FLECAINIDE ACETATE 50 MG/1
50 TABLET ORAL 2 TIMES DAILY
Qty: 180 TABLET | Refills: 3 | Status: SHIPPED | OUTPATIENT
Start: 2022-10-27 | End: 2022-10-27 | Stop reason: SDUPTHER

## 2022-10-27 RX ORDER — METOPROLOL SUCCINATE 25 MG/1
25 TABLET, EXTENDED RELEASE ORAL DAILY
Qty: 90 TABLET | Refills: 1 | Status: SHIPPED | OUTPATIENT
Start: 2022-10-27 | End: 2023-01-25

## 2022-10-27 NOTE — PROGRESS NOTES
[facility] [place]  [facility] CARDIOLOGY  [address] [place] [address]  Dept: [phone]  Dept Fax: [phone]    Nesha Bacon  1945    Follow Up Office Visit Note    History of Present Illness:  Nesha Bacon is a 77 y.o. female who presents to the clinic for paroxysmal atrial Fibrillation- Asymptomatic on flecainide 100 mg bid and Toprol xl 50 mg HR is slower, will lower to 25 mg toprol    The following portions of the patient's history were reviewed and updated as appropriate: allergies, current medications, past family history, past medical history, past social history, past surgical history and problem list.    Medications:  flecainide  fludrocortisone  metoprolol succinate XL  potassium chloride  pramipexole  rivaroxaban  triamcinolone    Subjective  Allergies   Allergen Reactions   • Aspirin Palpitations     Chest tightness   • Latex Rash        Past Medical History:   Diagnosis Date   • Acute cystitis without hematuria    • Age-related osteoporosis without current pathological fracture    • Back pain    • BMI 20.0-20.9, adult    • Bone injury     BONE OR JOINT INJURIES   • Chronic fatigue    • Chronic obstructive pulmonary disease (HCC)    • Closed fracture of left hip, with routine healing, subsequent encounter    • Lightheadedness     CHRONIC   • Paroxysmal atrial fibrillation (HCC)    • Postoperative anemia    • Pure hypercholesterolemia    • Renal cancer (HCC)    • Shortness of breath        Past Surgical History:   Procedure Laterality Date   • CATARACT EXTRACTION, BILATERAL     • CHOLECYSTECTOMY     • HIP FRACTURE SURGERY     • LUMBAR LAMINECTOMY     • NEPHRECTOMY PARTIAL     • TUBAL ABDOMINAL LIGATION     • VAGINAL HYSTERECTOMY SALPINGO OOPHORECTOMY WITH ANTERIOR AND POSTERIOR VAGINAL REPAIR         Family History   Problem Relation Age of Onset   • Stroke Mother    • Hypertension Mother    • Coronary artery disease Brother         Social History  "    Socioeconomic History   • Marital status: Single   Tobacco Use   • Smoking status: Never   • Smokeless tobacco: Never   Vaping Use   • Vaping Use: Never used   Substance and Sexual Activity   • Alcohol use: Not Currently   • Drug use: Never   • Sexual activity: Defer       Review of Systems   Constitutional: Negative.    HENT: Negative.    Respiratory: Negative.    Cardiovascular: Negative.    Endocrine: Negative.    Genitourinary: Negative.    Musculoskeletal: Negative.    Skin: Negative.    Allergic/Immunologic: Negative.    Neurological: Negative.    Hematological: Negative.    Psychiatric/Behavioral: Negative.        Cardiovascular Procedures    ECHO/MUGA:   STRESS TESTS:   CARDIAC CATH:   DEVICES:   HOLTER:   CT/MRI:   VASCULAR:   CARDIOTHORACIC:     Objective  Vitals:    10/27/22 1321   BP: 124/72   BP Location: Right arm   Patient Position: Lying   Cuff Size: Adult   Pulse: 55   Resp: 18   Temp: 96.6 °F (35.9 °C)   TempSrc: Infrared   SpO2: 98%   Weight: 61.2 kg (135 lb)   Height: 167.6 cm (66\")   PainSc: 0-No pain     Body mass index is 21.79 kg/m².     Physical Exam  Constitutional:       Appearance: Healthy appearance. Not in distress.   Neck:      Vascular: No JVR. JVD normal.   Pulmonary:      Effort: Pulmonary effort is normal.      Breath sounds: Normal breath sounds. No wheezing. No rhonchi. No rales.   Chest:      Chest wall: Not tender to palpatation.   Cardiovascular:      PMI at left midclavicular line. Normal rate. Regular rhythm. Normal S1. Normal S2.      Murmurs: There is no murmur.      No gallop. No click. No rub.   Pulses:     Intact distal pulses.   Edema:     Peripheral edema absent.   Abdominal:      General: Bowel sounds are normal.      Palpations: Abdomen is soft.      Tenderness: There is no abdominal tenderness.   Musculoskeletal: Normal range of motion.         General: No tenderness. Skin:     General: Skin is warm and dry.   Neurological:      General: No focal deficit " present.      Mental Status: Alert and oriented to person, place and time.          Diagnostic Data    ECG 12 Lead    Date/Time: 10/27/2022 1:49 PM  Performed by: dAiel Kang MD  Authorized by: Adiel Kang MD   Comparison: compared with previous ECG from 7/26/2022  Similar to previous ECG  Rhythm: sinus rhythm and sinus bradycardia  Ectopy: atrial premature contractions  Rate: bradycardic  BPM: 56  QRS axis: normal    Clinical impression: normal ECG            Assessment and Plan  Diagnoses and all orders for this visit:    Autonomic orthostatic hypotension- asymptomatic on fludrocortisone 0,1 daily no changes in BP standing     Paroxysmal atrial fibrillation (HCC)- In sinus asymptomatic Keep flecainide 100 mg bid, lower Toprol xl 25 mg     Other orders  -     Discontinue: flecainide (TAMBOCOR) 50 MG tablet; Take 1 tablet by mouth 2 (Two) Times a Day.  -     metoprolol succinate XL (TOPROL-XL) 25 MG 24 hr tablet; Take 1 tablet by mouth Daily.  -     flecainide (TAMBOCOR) 100 MG tablet; Take 1 tablet by mouth 2 (Two) Times a Day.         No follow-ups on file.    Adiel Kang MD  10/27/2022

## 2022-11-16 DIAGNOSIS — M81.0 AGE-RELATED OSTEOPOROSIS WITHOUT CURRENT PATHOLOGICAL FRACTURE: Primary | ICD-10-CM

## 2022-11-23 ENCOUNTER — TELEPHONE (OUTPATIENT)
Dept: CARDIOLOGY | Facility: CLINIC | Age: 77
End: 2022-11-23

## 2022-11-23 DIAGNOSIS — R00.2 PALPITATIONS: ICD-10-CM

## 2022-11-23 NOTE — TELEPHONE ENCOUNTER
Spoke with Ms. Bacon and advised it was time to  Reapply for the [employer].  I will need a copy of your 2021 SS statement and you will sign the application when you drop it off. Pt verbalized understanding.

## 2023-01-03 ENCOUNTER — TELEPHONE (OUTPATIENT)
Dept: CARDIOLOGY | Facility: CLINIC | Age: 78
End: 2023-01-03
Payer: MEDICARE

## 2023-01-03 DIAGNOSIS — R00.2 PALPITATIONS: ICD-10-CM

## 2023-01-03 NOTE — TELEPHONE ENCOUNTER
Spoke with [facility] and they advised that the xarelto will cost 126.00 for a 3 month supply, which is 42.00 monthly.  Discussed with . Bacon that the other option is to go to Coumadin and that will require finger sticks.  Pt states she will fill Xarelto on a monthly basis.

## 2023-01-03 NOTE — TELEPHONE ENCOUNTER
Spoke with Ms. Bacon and advised her that I was working on price checks for Xarelto and Eliquis and will call her back once I have the information.

## 2023-01-24 DIAGNOSIS — R00.2 PALPITATIONS: ICD-10-CM

## 2023-01-25 RX ORDER — RIVAROXABAN 20 MG/1
TABLET, FILM COATED ORAL
Qty: 90 TABLET | Refills: 3 | Status: SHIPPED | OUTPATIENT
Start: 2023-01-25

## 2023-01-25 RX ORDER — METOPROLOL SUCCINATE 25 MG/1
TABLET, EXTENDED RELEASE ORAL
Qty: 90 TABLET | Refills: 1 | Status: SHIPPED | OUTPATIENT
Start: 2023-01-25

## 2023-01-25 RX ORDER — FLUDROCORTISONE ACETATE 0.1 MG/1
TABLET ORAL
Qty: 90 TABLET | Refills: 2 | Status: SHIPPED | OUTPATIENT
Start: 2023-01-25

## 2023-02-17 ENCOUNTER — LAB (OUTPATIENT)
Dept: FAMILY MEDICINE CLINIC | Facility: CLINIC | Age: 78
End: 2023-02-17
Payer: MEDICARE

## 2023-02-17 DIAGNOSIS — M80.00XD OSTEOPOROSIS WITH PATHOLOGICAL FRACTURE WITH ROUTINE HEALING, SUBSEQUENT ENCOUNTER: Primary | ICD-10-CM

## 2023-02-17 PROCEDURE — 36415 COLL VENOUS BLD VENIPUNCTURE: CPT | Performed by: PHYSICIAN ASSISTANT

## 2023-02-18 LAB
BUN SERPL-MCNC: 16 MG/DL (ref 8–27)
BUN/CREAT SERPL: 22 (ref 12–28)
CALCIUM SERPL-MCNC: 8.9 MG/DL (ref 8.7–10.3)
CHLORIDE SERPL-SCNC: 101 MMOL/L (ref 96–106)
CO2 SERPL-SCNC: 29 MMOL/L (ref 20–29)
CREAT SERPL-MCNC: 0.72 MG/DL (ref 0.57–1)
EGFRCR SERPLBLD CKD-EPI 2021: 86 ML/MIN/1.73
GLUCOSE SERPL-MCNC: 97 MG/DL (ref 70–99)
POTASSIUM SERPL-SCNC: 2.9 MMOL/L (ref 3.5–5.2)
SODIUM SERPL-SCNC: 147 MMOL/L (ref 134–144)

## 2023-02-24 ENCOUNTER — OFFICE VISIT (OUTPATIENT)
Dept: FAMILY MEDICINE CLINIC | Facility: CLINIC | Age: 78
End: 2023-02-24
Payer: MEDICARE

## 2023-02-24 VITALS
BODY MASS INDEX: 22.08 KG/M2 | SYSTOLIC BLOOD PRESSURE: 160 MMHG | DIASTOLIC BLOOD PRESSURE: 70 MMHG | HEART RATE: 58 BPM | OXYGEN SATURATION: 97 % | HEIGHT: 66 IN | WEIGHT: 137.4 LBS

## 2023-02-24 DIAGNOSIS — I48.0 PAROXYSMAL ATRIAL FIBRILLATION: ICD-10-CM

## 2023-02-24 DIAGNOSIS — E87.6 HYPOKALEMIA: ICD-10-CM

## 2023-02-24 DIAGNOSIS — J41.0 SIMPLE CHRONIC BRONCHITIS: ICD-10-CM

## 2023-02-24 DIAGNOSIS — G25.81 RESTLESS LEG SYNDROME: Primary | ICD-10-CM

## 2023-02-24 DIAGNOSIS — R03.0 ELEVATED BLOOD PRESSURE READING: ICD-10-CM

## 2023-02-24 PROBLEM — I20.8 STABLE ANGINA PECTORIS: Status: ACTIVE | Noted: 2023-02-24

## 2023-02-24 PROBLEM — I20.89 STABLE ANGINA PECTORIS: Status: ACTIVE | Noted: 2023-02-24

## 2023-02-24 PROBLEM — I20.8 STABLE ANGINA PECTORIS (HCC): Status: RESOLVED | Noted: 2023-02-24 | Resolved: 2023-02-24

## 2023-02-24 PROBLEM — I20.89 STABLE ANGINA PECTORIS: Status: RESOLVED | Noted: 2023-02-24 | Resolved: 2023-02-24

## 2023-02-24 PROBLEM — R07.89 ATYPICAL CHEST PAIN: Status: RESOLVED | Noted: 2021-04-22 | Resolved: 2023-02-24

## 2023-02-24 PROBLEM — M80.00XA OSTEOPOROSIS WITH CURRENT PATHOLOGICAL FRACTURE: Status: ACTIVE | Noted: 2023-02-10

## 2023-02-24 PROCEDURE — 99214 OFFICE O/P EST MOD 30 MIN: CPT | Performed by: PHYSICIAN ASSISTANT

## 2023-02-24 RX ORDER — PRAMIPEXOLE DIHYDROCHLORIDE 0.12 MG/1
0.12 TABLET ORAL NIGHTLY
Qty: 30 TABLET | Refills: 5 | Status: SHIPPED | OUTPATIENT
Start: 2023-02-24

## 2023-02-24 RX ORDER — POTASSIUM CHLORIDE 750 MG/1
10 CAPSULE, EXTENDED RELEASE ORAL 2 TIMES DAILY
Qty: 60 CAPSULE | Refills: 5 | Status: SHIPPED | OUTPATIENT
Start: 2023-02-24

## 2023-02-24 NOTE — PROGRESS NOTES
"Chief Complaint  Med Refill    Subjective          Nesha Bacon presents to [facility]  History of Present Illness she comes in today stating that she needs some of her medication refilled specifically the medicine she takes for restless legs.  She says it works well for her at the present dose.  She had blood work done last week.     Objective   Vital Signs:   /70   Pulse 58   Ht 167.6 cm (66\")   Wt 62.3 kg (137 lb 6.4 oz)   SpO2 97%   BMI 22.18 kg/m²     Body mass index is 22.18 kg/m².    Review of Systems   Constitutional: Negative for chills, fatigue and fever.   Respiratory: Negative for cough and shortness of breath.    Cardiovascular: Negative for chest pain and palpitations.   Musculoskeletal: Negative for back pain and myalgias.   Neurological: Negative for dizziness and headache.   Psychiatric/Behavioral: Negative for depressed mood. The patient is not nervous/anxious.        Past History:  Medical History: has a past medical history of Acute cystitis without hematuria, Age-related osteoporosis without current pathological fracture, Back pain, BMI 20.0-20.9, adult, Bone injury, Chronic fatigue, Chronic obstructive pulmonary disease (HCC), Closed fracture of left hip, with routine healing, subsequent encounter, Lightheadedness, Paroxysmal atrial fibrillation (HCC), Postoperative anemia, Pure hypercholesterolemia, Renal cancer (HCC), and Shortness of breath.   Surgical History: has a past surgical history that includes Partial nephrectomy; Lumbar laminectomy; vaginal hysterectomy salpingo oophorectomy with anterior and posterior vaginal repair; Cataract extraction, bilateral; Cholecystectomy; Tubal ligation; and Hip fracture surgery.   Family History: family history includes Coronary artery disease in her brother; Hypertension in her mother; Stroke in her mother.   Social History: reports that she has never smoked. She has never used smokeless tobacco. She " reports that she does not currently use alcohol. She reports that she does not use drugs.      Current Outpatient Medications:   •  flecainide (TAMBOCOR) 100 MG tablet, Take 1 tablet by mouth 2 (Two) Times a Day., Disp: 180 tablet, Rfl: 3  •  fludrocortisone 0.1 MG tablet, TAKE 1 TABLET EVERY DAY, Disp: 90 tablet, Rfl: 2  •  metoprolol succinate XL (TOPROL-XL) 25 MG 24 hr tablet, TAKE 1 TABLET EVERY DAY, Disp: 90 tablet, Rfl: 1  •  potassium chloride (MICRO-K) 10 MEQ CR capsule, Take 1 capsule by mouth 2 (Two) Times a Day., Disp: 60 capsule, Rfl: 5  •  pramipexole (MIRAPEX) 0.125 MG tablet, Take 1 tablet by mouth Every Night., Disp: 30 tablet, Rfl: 5  •  Xarelto 20 MG tablet, TAKE 1 TABLET EVERY DAY, Disp: 90 tablet, Rfl: 3    Allergies: Aspirin and Latex    Physical Exam  Vitals reviewed.   Constitutional:       Appearance: Normal appearance.   Cardiovascular:      Rate and Rhythm: Normal rate and regular rhythm.      Heart sounds: Normal heart sounds.   Pulmonary:      Effort: Pulmonary effort is normal.      Breath sounds: Normal breath sounds.   Abdominal:      General: Bowel sounds are normal.      Palpations: Abdomen is soft.   Musculoskeletal:         General: Normal range of motion.   Neurological:      General: No focal deficit present.      Mental Status: She is alert and oriented to person, place, and time.   Psychiatric:         Mood and Affect: Mood normal.         Behavior: Behavior normal.          Result Review :                   Assessment and Plan    Diagnoses and all orders for this visit:    1. Restless leg syndrome (Primary)  Assessment & Plan:  Continue present care no changes meds refilled.      2. Elevated blood pressure reading  Assessment & Plan:  I rechecked her BP and it was in fact high at 172/88.  I am not making any changes, her BP fluctuates and she is taking Fludrocortisone prescribed by her cardiologist to increase her BP due to some issues she was having with low BP.   I will let  them decide whether this needs to be discontinued.       3. Hypokalemia  Assessment & Plan:  She had been taking potassium, ran out and it was not refilled so she decided to try eating bananas to keep her potassium up, however her potassium was low when it was checked last week so I am putting her back on her potassium and will have her get BW again next week when she comes in to see cardiology.     Orders:  -     Basic Metabolic Panel; Future    4. Simple chronic bronchitis (HCC)  Assessment & Plan:  This is stable and she is not requiring any inhalers.        5. Paroxysmal atrial fibrillation (HCC)  Assessment & Plan:  This remains stable and followed by cardiology, she has an appt with them next Tuesday.        Other orders  -     potassium chloride (MICRO-K) 10 MEQ CR capsule; Take 1 capsule by mouth 2 (Two) Times a Day.  Dispense: 60 capsule; Refill: 5  -     pramipexole (MIRAPEX) 0.125 MG tablet; Take 1 tablet by mouth Every Night.  Dispense: 30 tablet; Refill: 5      Follow Up   Return in about 6 months (around 8/24/2023) for Recheck.  Patient was given instructions and counseling regarding her condition or for health maintenance advice. Please see specific information pulled into the AVS if appropriate.     Julia Foote PA-C

## 2023-02-28 ENCOUNTER — OFFICE VISIT (OUTPATIENT)
Dept: CARDIOLOGY | Facility: CLINIC | Age: 78
End: 2023-02-28
Payer: MEDICARE

## 2023-02-28 VITALS
HEART RATE: 63 BPM | WEIGHT: 135 LBS | DIASTOLIC BLOOD PRESSURE: 78 MMHG | SYSTOLIC BLOOD PRESSURE: 154 MMHG | HEIGHT: 66 IN | BODY MASS INDEX: 21.69 KG/M2 | OXYGEN SATURATION: 97 % | RESPIRATION RATE: 18 BRPM | TEMPERATURE: 97.1 F

## 2023-02-28 DIAGNOSIS — I95.1 AUTONOMIC ORTHOSTATIC HYPOTENSION: ICD-10-CM

## 2023-02-28 DIAGNOSIS — I48.0 PAROXYSMAL ATRIAL FIBRILLATION: Primary | ICD-10-CM

## 2023-02-28 DIAGNOSIS — I10 ESSENTIAL HYPERTENSION: ICD-10-CM

## 2023-02-28 PROBLEM — R03.0 ELEVATED BLOOD PRESSURE READING: Status: RESOLVED | Noted: 2023-02-24 | Resolved: 2023-02-28

## 2023-02-28 PROCEDURE — 36415 COLL VENOUS BLD VENIPUNCTURE: CPT | Performed by: PHYSICIAN ASSISTANT

## 2023-02-28 PROCEDURE — 99214 OFFICE O/P EST MOD 30 MIN: CPT | Performed by: INTERNAL MEDICINE

## 2023-02-28 PROCEDURE — 93000 ELECTROCARDIOGRAM COMPLETE: CPT | Performed by: INTERNAL MEDICINE

## 2023-02-28 RX ORDER — LOSARTAN POTASSIUM 50 MG/1
50 TABLET ORAL DAILY
Qty: 90 TABLET | Refills: 2 | Status: SHIPPED | OUTPATIENT
Start: 2023-02-28

## 2023-02-28 NOTE — PROGRESS NOTES
[facility] [place]  [facility] CARDIOLOGY  [address] [place] [address]  Dept: [phone]  Dept Fax: [phone]    Nesha Bacon  1945    Follow Up Office Visit Note    History of Present Illness:  Nesha Bacon is a 78 y.o. female who presents to the clinic for Follow-up. Hypertension- She has noticed for the last week, BP elevated 150 160 and one time near 200, here is 160-.80., she takes fludrocortisone 0,1 for OH and she seems doing better, she has not been eating salty food, will use Losartan 50 mg at bed time,     The following portions of the patient's history were reviewed and updated as appropriate: allergies, current medications, past family history, past medical history, past social history, past surgical history, and problem list.    Medications:  flecainide  fludrocortisone  losartan  metoprolol succinate XL  potassium chloride  pramipexole  Xarelto tablet    Subjective  Allergies   Allergen Reactions   • Aspirin Palpitations     Chest tightness   • Latex Rash        Past Medical History:   Diagnosis Date   • Acute cystitis without hematuria    • Age-related osteoporosis without current pathological fracture    • Back pain    • BMI 20.0-20.9, adult    • Bone injury     BONE OR JOINT INJURIES   • Chronic fatigue    • Chronic obstructive pulmonary disease (HCC)    • Closed fracture of left hip, with routine healing, subsequent encounter    • Lightheadedness     CHRONIC   • Paroxysmal atrial fibrillation (HCC)    • Postoperative anemia    • Pure hypercholesterolemia    • Renal cancer (HCC)    • Shortness of breath        Past Surgical History:   Procedure Laterality Date   • CATARACT EXTRACTION, BILATERAL     • CHOLECYSTECTOMY     • HIP FRACTURE SURGERY     • LUMBAR LAMINECTOMY     • NEPHRECTOMY PARTIAL     • TUBAL ABDOMINAL LIGATION     • VAGINAL HYSTERECTOMY SALPINGO OOPHORECTOMY WITH ANTERIOR AND POSTERIOR VAGINAL REPAIR         Family History   Problem  "Relation Age of Onset   • Stroke Mother    • Hypertension Mother    • Coronary artery disease Brother         Social History     Socioeconomic History   • Marital status: Single   Tobacco Use   • Smoking status: Never   • Smokeless tobacco: Never   Vaping Use   • Vaping Use: Never used   Substance and Sexual Activity   • Alcohol use: Not Currently   • Drug use: Never   • Sexual activity: Defer       Review of Systems   Constitutional: Negative.    HENT: Negative.    Respiratory: Negative.    Cardiovascular: Negative.    Endocrine: Negative.    Genitourinary: Negative.    Musculoskeletal: Negative.    Skin: Negative.    Allergic/Immunologic: Negative.    Neurological: Negative.    Hematological: Negative.    Psychiatric/Behavioral: Negative.        Cardiovascular Procedures    ECHO/MUGA:  STRESS TESTS:   CARDIAC CATH:   DEVICES:   HOLTER:   CT/MRI:   VASCULAR:   CARDIOTHORACIC:     Objective  Vitals:    02/28/23 1413   BP: 154/78   BP Location: Right arm   Patient Position: Lying   Cuff Size: Adult   Pulse: 63   Resp: 18   Temp: 97.1 °F (36.2 °C)   TempSrc: Oral   SpO2: 97%   Weight: 61.2 kg (135 lb)   Height: 167.6 cm (66\")   PainSc: 0-No pain     Body mass index is 21.79 kg/m².     Physical Exam  Constitutional:       Appearance: Healthy appearance. Not in distress.   Neck:      Vascular: No JVR. JVD normal.   Pulmonary:      Effort: Pulmonary effort is normal.      Breath sounds: Normal breath sounds. No wheezing. No rhonchi. No rales.   Chest:      Chest wall: Not tender to palpatation.   Cardiovascular:      PMI at left midclavicular line. Normal rate. Regular rhythm. Normal S1. Normal S2.      Murmurs: There is no murmur.      No gallop. No click. No rub.   Pulses:     Intact distal pulses.   Edema:     Peripheral edema absent.   Abdominal:      General: Bowel sounds are normal.      Palpations: Abdomen is soft.      Tenderness: There is no abdominal tenderness.   Musculoskeletal: Normal range of motion.        "  General: No tenderness. Skin:     General: Skin is warm and dry.   Neurological:      General: No focal deficit present.      Mental Status: Alert and oriented to person, place and time.          Diagnostic Data    ECG 12 Lead    Date/Time: 2/28/2023 2:39 PM  Performed by: Adiel Kang MD  Authorized by: Adiel Kang MD   Comparison: compared with previous ECG from 10/28/2022  Similar to previous ECG  Rhythm: sinus rhythm and sinus bradycardia  Rate: bradycardic  BPM: 58  Conduction: left anterior fascicular block and non-specific intraventricular conduction delay  QRS axis: left  Other findings: poor R wave progression    Clinical impression: abnormal EKG            Assessment and Plan  Diagnoses and all orders for this visit:    Paroxysmal atrial fibrillation (HCC)-On flecainide 100 mg bid and also Toprol xl 25 mg  plus Xarelto 20 mg     Autonomic orthostatic hypotension- On Fludrocortisone no OH changes today     Essential hypertension- BP is high 160.80, will use Losartan 50 mg PM    Other orders  -     losartan (Cozaar) 50 MG tablet; Take 1 tablet by mouth Daily.         Return in about 3 weeks (around 3/21/2023).    Adiel Kang MD  02/28/2023

## 2023-03-01 LAB
BUN SERPL-MCNC: 18 MG/DL (ref 8–27)
BUN/CREAT SERPL: 23 (ref 12–28)
CALCIUM SERPL-MCNC: 9 MG/DL (ref 8.7–10.3)
CHLORIDE SERPL-SCNC: 106 MMOL/L (ref 96–106)
CO2 SERPL-SCNC: 24 MMOL/L (ref 20–29)
CREAT SERPL-MCNC: 0.8 MG/DL (ref 0.57–1)
EGFRCR SERPLBLD CKD-EPI 2021: 75 ML/MIN/1.73
GLUCOSE SERPL-MCNC: 96 MG/DL (ref 70–99)
POTASSIUM SERPL-SCNC: 3.7 MMOL/L (ref 3.5–5.2)
SODIUM SERPL-SCNC: 146 MMOL/L (ref 134–144)

## 2023-03-21 ENCOUNTER — OFFICE VISIT (OUTPATIENT)
Dept: CARDIOLOGY | Facility: CLINIC | Age: 78
End: 2023-03-21
Payer: MEDICARE

## 2023-03-21 VITALS
HEIGHT: 66 IN | OXYGEN SATURATION: 96 % | WEIGHT: 136 LBS | BODY MASS INDEX: 21.86 KG/M2 | TEMPERATURE: 97 F | DIASTOLIC BLOOD PRESSURE: 70 MMHG | SYSTOLIC BLOOD PRESSURE: 118 MMHG | RESPIRATION RATE: 18 BRPM | HEART RATE: 66 BPM

## 2023-03-21 DIAGNOSIS — I48.0 PAROXYSMAL ATRIAL FIBRILLATION: ICD-10-CM

## 2023-03-21 DIAGNOSIS — I10 ESSENTIAL HYPERTENSION: ICD-10-CM

## 2023-03-21 DIAGNOSIS — I95.1 AUTONOMIC ORTHOSTATIC HYPOTENSION: Primary | ICD-10-CM

## 2023-03-21 PROCEDURE — 99214 OFFICE O/P EST MOD 30 MIN: CPT | Performed by: INTERNAL MEDICINE

## 2023-03-21 PROCEDURE — 93000 ELECTROCARDIOGRAM COMPLETE: CPT | Performed by: INTERNAL MEDICINE

## 2023-03-21 PROCEDURE — 1159F MED LIST DOCD IN RCRD: CPT | Performed by: INTERNAL MEDICINE

## 2023-03-21 PROCEDURE — 3074F SYST BP LT 130 MM HG: CPT | Performed by: INTERNAL MEDICINE

## 2023-03-21 PROCEDURE — 1160F RVW MEDS BY RX/DR IN RCRD: CPT | Performed by: INTERNAL MEDICINE

## 2023-03-21 PROCEDURE — 3078F DIAST BP <80 MM HG: CPT | Performed by: INTERNAL MEDICINE

## 2023-03-21 NOTE — PROGRESS NOTES
[facility] [place]  [facility] CARDIOLOGY  [address] [place] [address]  Dept: [phone]  Dept Fax: [phone]    Nesha Bacon  1945    Follow Up Office Visit Note    History of Present Illness:  Nesha Bacon is a 78 y.o. female who presents to the clinic for Follow-up. Hypertension - BP is better control 140.80 only on 50 mg Losartan at night due to OH, she is asymptomatic, doing well     The following portions of the patient's history were reviewed and updated as appropriate: allergies, current medications, past family history, past medical history, past social history, past surgical history, and problem list.    Medications:  flecainide  fludrocortisone  losartan  metoprolol succinate XL  potassium chloride  pramipexole  Xarelto tablet    Subjective  Allergies   Allergen Reactions   • Aspirin Palpitations     Chest tightness   • Latex Rash        Past Medical History:   Diagnosis Date   • Acute cystitis without hematuria    • Age-related osteoporosis without current pathological fracture    • Back pain    • BMI 20.0-20.9, adult    • Bone injury     BONE OR JOINT INJURIES   • Chronic fatigue    • Chronic obstructive pulmonary disease (HCC)    • Closed fracture of left hip, with routine healing, subsequent encounter    • Lightheadedness     CHRONIC   • Paroxysmal atrial fibrillation (HCC)    • Postoperative anemia    • Pure hypercholesterolemia    • Renal cancer (HCC)    • Shortness of breath        Past Surgical History:   Procedure Laterality Date   • CATARACT EXTRACTION, BILATERAL     • CHOLECYSTECTOMY     • HIP FRACTURE SURGERY     • LUMBAR LAMINECTOMY     • NEPHRECTOMY PARTIAL     • TUBAL ABDOMINAL LIGATION     • VAGINAL HYSTERECTOMY SALPINGO OOPHORECTOMY WITH ANTERIOR AND POSTERIOR VAGINAL REPAIR         Family History   Problem Relation Age of Onset   • Stroke Mother    • Hypertension Mother    • Coronary artery disease Brother         Social History  "    Socioeconomic History   • Marital status: Single   Tobacco Use   • Smoking status: Never   • Smokeless tobacco: Never   Vaping Use   • Vaping Use: Never used   Substance and Sexual Activity   • Alcohol use: Not Currently   • Drug use: Never   • Sexual activity: Defer       Review of Systems   Constitutional: Negative.    HENT: Negative.    Respiratory: Negative.    Cardiovascular: Negative.    Endocrine: Negative.    Genitourinary: Negative.    Musculoskeletal: Negative.    Skin: Negative.    Allergic/Immunologic: Negative.    Neurological: Negative.    Hematological: Negative.    Psychiatric/Behavioral: Negative.        Cardiovascular Procedures    ECHO/MUGA:  STRESS TESTS:   CARDIAC CATH:   DEVICES:   HOLTER:   CT/MRI:   VASCULAR:   CARDIOTHORACIC:     Objective  Vitals:    03/21/23 1441   BP: 118/70   BP Location: Right arm   Patient Position: Lying   Cuff Size: Adult   Pulse: 66   Resp: 18   Temp: 97 °F (36.1 °C)   TempSrc: Infrared   SpO2: 96%   Weight: 61.7 kg (136 lb)   Height: 167.6 cm (66\")   PainSc: 0-No pain     Body mass index is 21.95 kg/m².     Physical Exam  Vitals reviewed.   Constitutional:       Appearance: Healthy appearance. Not in distress.   Neck:      Vascular: No JVR. JVD normal.   Pulmonary:      Effort: Pulmonary effort is normal.      Breath sounds: Normal breath sounds. No wheezing. No rhonchi. No rales.   Chest:      Chest wall: Not tender to palpatation.   Cardiovascular:      PMI at left midclavicular line. Normal rate. Regular rhythm. Normal S1. Normal S2.      Murmurs: There is no murmur.      No gallop. No click. No rub.   Pulses:     Intact distal pulses.   Edema:     Peripheral edema absent.   Abdominal:      General: Bowel sounds are normal.      Palpations: Abdomen is soft.      Tenderness: There is no abdominal tenderness.   Musculoskeletal: Normal range of motion.         General: No tenderness. Skin:     General: Skin is warm and dry.   Neurological:      General: No " focal deficit present.      Mental Status: Alert and oriented to person, place and time.          Diagnostic Data    ECG 12 Lead    Date/Time: 3/21/2023 3:02 PM  Performed by: Adiel Kang MD  Authorized by: Adiel Kang MD   Comparison: compared with previous ECG from 2/28/2023  Similar to previous ECG  Rhythm: sinus rhythm  Rate: normal  BPM: 61  QRS axis: left    Clinical impression: normal ECG            Assessment and Plan  Diagnoses and all orders for this visit:    Autonomic orthostatic hypotension-- asymptomatic, on fludrocortisone 0.,1 mg    Essential hypertension- No complaints better 140.80 on Losartan 50 mg     Paroxysmal atrial fibrillation (HCC)- In sinus rhythm on flecainide 100 mg bid plus Toprol xl 25 mg and also Xarelto 20 mg         Return in about 6 months (around 9/21/2023) for Recheck with Dr. Kang.    Adiel Kang MD  03/21/2023

## 2023-05-04 ENCOUNTER — LAB (OUTPATIENT)
Dept: FAMILY MEDICINE CLINIC | Facility: CLINIC | Age: 78
End: 2023-05-04
Payer: MEDICARE

## 2023-05-04 DIAGNOSIS — M81.0 OSTEOPOROSIS, UNSPECIFIED OSTEOPOROSIS TYPE, UNSPECIFIED PATHOLOGICAL FRACTURE PRESENCE: Primary | ICD-10-CM

## 2023-05-05 LAB
BUN SERPL-MCNC: 21 MG/DL (ref 8–27)
BUN/CREAT SERPL: 26 (ref 12–28)
CALCIUM SERPL-MCNC: 9.1 MG/DL (ref 8.7–10.3)
CHLORIDE SERPL-SCNC: 105 MMOL/L (ref 96–106)
CO2 SERPL-SCNC: 24 MMOL/L (ref 20–29)
CREAT SERPL-MCNC: 0.81 MG/DL (ref 0.57–1)
EGFRCR SERPLBLD CKD-EPI 2021: 74 ML/MIN/1.73
GLUCOSE SERPL-MCNC: 98 MG/DL (ref 70–99)
POTASSIUM SERPL-SCNC: 3.8 MMOL/L (ref 3.5–5.2)
SODIUM SERPL-SCNC: 144 MMOL/L (ref 134–144)

## 2023-06-07 RX ORDER — FLUDROCORTISONE ACETATE 0.1 MG/1
TABLET ORAL
Qty: 90 TABLET | Refills: 1 | Status: SHIPPED | OUTPATIENT
Start: 2023-06-07

## 2023-07-31 RX ORDER — METOPROLOL SUCCINATE 25 MG/1
TABLET, EXTENDED RELEASE ORAL
Qty: 90 TABLET | Refills: 2 | Status: SHIPPED | OUTPATIENT
Start: 2023-07-31

## 2023-08-24 ENCOUNTER — OFFICE VISIT (OUTPATIENT)
Dept: FAMILY MEDICINE CLINIC | Facility: CLINIC | Age: 78
End: 2023-08-24
Payer: MEDICARE

## 2023-08-24 VITALS
BODY MASS INDEX: 23.16 KG/M2 | DIASTOLIC BLOOD PRESSURE: 76 MMHG | OXYGEN SATURATION: 97 % | WEIGHT: 139 LBS | HEIGHT: 65 IN | HEART RATE: 60 BPM | SYSTOLIC BLOOD PRESSURE: 120 MMHG

## 2023-08-24 DIAGNOSIS — G25.81 RESTLESS LEG SYNDROME: Primary | ICD-10-CM

## 2023-08-24 PROCEDURE — 3074F SYST BP LT 130 MM HG: CPT | Performed by: PHYSICIAN ASSISTANT

## 2023-08-24 PROCEDURE — 1159F MED LIST DOCD IN RCRD: CPT | Performed by: PHYSICIAN ASSISTANT

## 2023-08-24 PROCEDURE — 99213 OFFICE O/P EST LOW 20 MIN: CPT | Performed by: PHYSICIAN ASSISTANT

## 2023-08-24 PROCEDURE — 1160F RVW MEDS BY RX/DR IN RCRD: CPT | Performed by: PHYSICIAN ASSISTANT

## 2023-08-24 PROCEDURE — 3078F DIAST BP <80 MM HG: CPT | Performed by: PHYSICIAN ASSISTANT

## 2023-08-24 RX ORDER — PRAMIPEXOLE DIHYDROCHLORIDE 0.25 MG/1
0.25 TABLET ORAL NIGHTLY
Qty: 90 TABLET | Refills: 1 | Status: SHIPPED | OUTPATIENT
Start: 2023-08-24

## 2023-08-24 RX ORDER — IBUPROFEN 200 MG
CAPSULE ORAL DAILY
COMMUNITY

## 2023-08-24 NOTE — ASSESSMENT & PLAN NOTE
I have suggested that we increase her dose of pramipexole to 0.25mg and I will see her back in 6 months for a physical and annual wellness exam. Call or return if symptoms persist or worsen.

## 2023-08-24 NOTE — PROGRESS NOTES
"Answers submitted by the patient for this visit:  Other (Submitted on 8/21/2023)  Please describe your symptoms.: Follow up visit.  Have you had these symptoms before?: Yes  How long have you been having these symptoms?: Greater than 2 weeks  Please describe any probable cause for these symptoms. : None  Primary Reason for Visit (Submitted on 8/21/2023)  What is the primary reason for your visit?: Other  Chief Complaint  Restless Legs Syndrome (Patient needs a refill on her med)    Subjective          Nesha Bacon presents to [facility]  History of Present Illness  patient is here today for a refill of her pramipexole which she takes for restless leg syndrome, she still has some leg pain at night, but she missed a couple of doses of the medication and could really tell that is was helping.       Objective   Vital Signs:   /76 (BP Location: Right arm)   Pulse 60   Ht 165.1 cm (65\")   Wt 63 kg (139 lb)   SpO2 97%   BMI 23.13 kg/mý     Body mass index is 23.13 kg/mý.    Review of Systems   Constitutional:  Negative for chills, fatigue and fever.   Respiratory:  Negative for cough and shortness of breath.    Cardiovascular:  Negative for chest pain and palpitations.   Musculoskeletal:  Negative for back pain and myalgias.   Neurological:  Negative for dizziness and headache.   Psychiatric/Behavioral:  Negative for depressed mood. The patient is not nervous/anxious.      Past History:  Medical History: has a past medical history of Acute cystitis without hematuria, Age-related osteoporosis without current pathological fracture, Back pain, BMI 20.0-20.9, adult, Bone injury, Chronic fatigue, Chronic obstructive pulmonary disease, Closed fracture of left hip, with routine healing, subsequent encounter, Lightheadedness, Paroxysmal atrial fibrillation, Postoperative anemia, Pure hypercholesterolemia, Renal cancer, and Shortness of breath.   Surgical History: has a past surgical " history that includes Partial nephrectomy; Lumbar laminectomy; vaginal hysterectomy salpingo oophorectomy with anterior and posterior vaginal repair; Cataract extraction, bilateral; Cholecystectomy; Tubal ligation; and Hip fracture surgery.   Family History: family history includes Coronary artery disease in her brother; Hypertension in her mother; Stroke in her mother.   Social History: reports that she has never smoked. She has never used smokeless tobacco. She reports that she does not currently use alcohol. She reports that she does not use drugs.      Current Outpatient Medications:     flecainide (TAMBOCOR) 100 MG tablet, Take 1 tablet by mouth 2 (Two) Times a Day., Disp: 180 tablet, Rfl: 3    fludrocortisone 0.1 MG tablet, Take 1 tablet by mouth once daily, Disp: 90 tablet, Rfl: 1    losartan (Cozaar) 50 MG tablet, Take 1 tablet by mouth Daily., Disp: 90 tablet, Rfl: 2    metoprolol succinate XL (TOPROL-XL) 25 MG 24 hr tablet, Take 1 tablet by mouth once daily, Disp: 90 tablet, Rfl: 2    potassium chloride (MICRO-K) 10 MEQ CR capsule, Take 1 capsule by mouth 2 (Two) Times a Day., Disp: 60 capsule, Rfl: 5    pramipexole (MIRAPEX) 0.25 MG tablet, Take 1 tablet by mouth Every Night., Disp: 90 tablet, Rfl: 1    Xarelto 20 MG tablet, TAKE 1 TABLET EVERY DAY, Disp: 90 tablet, Rfl: 3    calcium carbonate (OS-ROLAND) 1250 (500 Ca) MG tablet, Take  by mouth Daily., Disp: , Rfl:     Allergies: Aspirin and Latex    Physical Exam  Vitals reviewed.   Constitutional:       Appearance: Normal appearance.   Cardiovascular:      Rate and Rhythm: Normal rate and regular rhythm.      Heart sounds: Normal heart sounds.   Pulmonary:      Effort: Pulmonary effort is normal.      Breath sounds: Normal breath sounds.   Abdominal:      General: Bowel sounds are normal.      Palpations: Abdomen is soft.   Musculoskeletal:         General: Normal range of motion.   Neurological:      General: No focal deficit present.      Mental Status:  She is alert and oriented to person, place, and time.   Psychiatric:         Mood and Affect: Mood normal.        Result Review :                   Assessment and Plan    Diagnoses and all orders for this visit:    1. Restless leg syndrome (Primary)  Assessment & Plan:  I have suggested that we increase her dose of pramipexole to 0.25mg and I will see her back in 6 months for a physical and annual wellness exam. Call or return if symptoms persist or worsen.       Other orders  -     pramipexole (MIRAPEX) 0.25 MG tablet; Take 1 tablet by mouth Every Night.  Dispense: 90 tablet; Refill: 1        Follow Up   Return in about 6 months (around 2/24/2024) for Medicare Wellness, Annual physical.  Patient was given instructions and counseling regarding her condition or for health maintenance advice. Please see specific information pulled into the AVS if appropriate.     Julia Foote PA-C

## 2023-09-21 ENCOUNTER — OFFICE VISIT (OUTPATIENT)
Dept: CARDIOLOGY | Facility: CLINIC | Age: 78
End: 2023-09-21
Payer: MEDICARE

## 2023-09-21 ENCOUNTER — TELEPHONE (OUTPATIENT)
Dept: PHARMACY | Facility: HOSPITAL | Age: 78
End: 2023-09-21

## 2023-09-21 VITALS
RESPIRATION RATE: 17 BRPM | WEIGHT: 198.38 LBS | DIASTOLIC BLOOD PRESSURE: 74 MMHG | HEIGHT: 65 IN | BODY MASS INDEX: 33.05 KG/M2 | OXYGEN SATURATION: 100 % | SYSTOLIC BLOOD PRESSURE: 119 MMHG | HEART RATE: 59 BPM

## 2023-09-21 DIAGNOSIS — E78.00 HYPERCHOLESTEROLEMIA: ICD-10-CM

## 2023-09-21 DIAGNOSIS — I95.1 AUTONOMIC ORTHOSTATIC HYPOTENSION: ICD-10-CM

## 2023-09-21 DIAGNOSIS — I48.0 PAROXYSMAL ATRIAL FIBRILLATION: Primary | ICD-10-CM

## 2023-09-21 DIAGNOSIS — I10 ESSENTIAL HYPERTENSION: ICD-10-CM

## 2023-09-21 DIAGNOSIS — I48.11 LONGSTANDING PERSISTENT ATRIAL FIBRILLATION: ICD-10-CM

## 2023-09-21 LAB — INR PPP: 1 (ref 0.9–1.1)

## 2023-09-21 PROCEDURE — 1159F MED LIST DOCD IN RCRD: CPT | Performed by: INTERNAL MEDICINE

## 2023-09-21 PROCEDURE — 3074F SYST BP LT 130 MM HG: CPT | Performed by: INTERNAL MEDICINE

## 2023-09-21 PROCEDURE — 3078F DIAST BP <80 MM HG: CPT | Performed by: INTERNAL MEDICINE

## 2023-09-21 PROCEDURE — 1160F RVW MEDS BY RX/DR IN RCRD: CPT | Performed by: INTERNAL MEDICINE

## 2023-09-21 PROCEDURE — 93000 ELECTROCARDIOGRAM COMPLETE: CPT | Performed by: INTERNAL MEDICINE

## 2023-09-21 RX ORDER — ASPIRIN 81 MG/1
81 TABLET ORAL DAILY
COMMUNITY

## 2023-09-21 RX ORDER — WARFARIN SODIUM 3 MG/1
5 TABLET ORAL
Qty: 30 TABLET | Refills: 2 | Status: SHIPPED | OUTPATIENT
Start: 2023-09-21

## 2023-09-21 RX ORDER — ROSUVASTATIN CALCIUM 5 MG/1
5 TABLET, COATED ORAL DAILY
Qty: 90 TABLET | Refills: 3 | Status: SHIPPED | OUTPATIENT
Start: 2023-09-21

## 2023-09-21 NOTE — Clinical Note
Patient is starting Warfarin today and Dr Kang wanted a baseline and we are faxing a referral form now.

## 2023-09-21 NOTE — TELEPHONE ENCOUNTER
Tamika from Dr. Kang's office called and LVM regarding new referral. I spoke with Ms. Bacon today, she is willing to drive into [place] for her initial Anticoagulation Clinic visit. She has picked up her warfarin 3 mg tablet prescription and will have 4.5 mg daily as directed until Her INR recheck appointment Monday 9/25/23 at 2:30 p.m.     Nuno Bacon, Pharmacy Technician  9/21/2023  16:20 EDT

## 2023-09-21 NOTE — PROGRESS NOTES
[facility]  [facility]  [address] [place] [address]  Dept: [phone]  Dept Fax: [phone]    Nesha Bacon  1945    Follow Up Office Visit Note    History of Present Illness:  Nesha Bacon is a 78 y.o. female who presents to the clinic for Follow-up.PAF- She can not afford the xarelto, has stop it and start taking Asa, her Gigi score is 3, On Flecainide 50 mg bid and Toprol xl 25 mg, will use warfarin EKG sinus HR 53    The following portions of the patient's history were reviewed and updated as appropriate: allergies, current medications, past family history, past medical history, past social history, past surgical history, and problem list.    Medications:  aspirin  calcium carbonate  flecainide  fludrocortisone  losartan  metoprolol succinate XL  potassium chloride  pramipexole    Subjective  Allergies   Allergen Reactions    Aspirin Palpitations     Chest tightness    Latex Rash        Past Medical History:   Diagnosis Date    Acute cystitis without hematuria     Age-related osteoporosis without current pathological fracture     Back pain     BMI 20.0-20.9, adult     Bone injury     BONE OR JOINT INJURIES    Chronic fatigue     Chronic obstructive pulmonary disease     Closed fracture of left hip, with routine healing, subsequent encounter     Lightheadedness     CHRONIC    Paroxysmal atrial fibrillation     Postoperative anemia     Pure hypercholesterolemia     Renal cancer     Shortness of breath        Past Surgical History:   Procedure Laterality Date    CATARACT EXTRACTION, BILATERAL      CHOLECYSTECTOMY      HIP FRACTURE SURGERY      LUMBAR LAMINECTOMY      NEPHRECTOMY PARTIAL      TUBAL ABDOMINAL LIGATION      VAGINAL HYSTERECTOMY SALPINGO OOPHORECTOMY WITH ANTERIOR AND POSTERIOR VAGINAL REPAIR         Family History   Problem Relation Age of Onset    Stroke Mother     Hypertension Mother     Coronary artery disease Brother      "    Social History     Socioeconomic History    Marital status: Single   Tobacco Use    Smoking status: Never    Smokeless tobacco: Never   Vaping Use    Vaping Use: Never used   Substance and Sexual Activity    Alcohol use: Not Currently    Drug use: Never    Sexual activity: Defer       Review of Systems   Constitutional: Negative.    HENT: Negative.     Respiratory: Negative.     Cardiovascular: Negative.    Endocrine: Negative.    Genitourinary: Negative.    Musculoskeletal: Negative.    Skin: Negative.    Allergic/Immunologic: Negative.    Neurological: Negative.    Hematological: Negative.    Psychiatric/Behavioral: Negative.       Cardiovascular Procedures    ECHO/MUGA:  STRESS TESTS:   CARDIAC CATH:   DEVICES:   HOLTER:   CT/MRI:   VASCULAR:   CARDIOTHORACIC:     Objective  Vitals:    09/21/23 1410   BP: 119/74   BP Location: Right arm   Patient Position: Sitting   Cuff Size: Adult   Pulse: 59   Resp: 17   SpO2: 100%   Weight: 90 kg (198 lb 6 oz)   Height: 165.1 cm (65\")     Body mass index is 33.01 kg/m².     Physical Exam  Vitals reviewed.   Constitutional:       Appearance: Healthy appearance. Not in distress.   Neck:      Vascular: No JVR. JVD normal.   Pulmonary:      Effort: Pulmonary effort is normal.      Breath sounds: Normal breath sounds. No wheezing. No rhonchi. No rales.   Chest:      Chest wall: Not tender to palpatation.   Cardiovascular:      PMI at left midclavicular line. Normal rate. Regular rhythm. Normal S1. Normal S2.       Murmurs: There is no murmur.      No gallop.  No click. No rub.   Pulses:     Intact distal pulses.   Edema:     Peripheral edema absent.   Abdominal:      General: Bowel sounds are normal.      Palpations: Abdomen is soft.      Tenderness: There is no abdominal tenderness.   Musculoskeletal: Normal range of motion.         General: No tenderness. Skin:     General: Skin is warm and dry.   Neurological:      General: No focal deficit present.      Mental Status: " Alert and oriented to person, place and time.        Diagnostic Data    ECG 12 Lead    Date/Time: 9/21/2023 2:33 PM  Performed by: Adiel Kang MD  Authorized by: Adiel Kang MD   Comparison: compared with previous ECG from 3/21/2023  Similar to previous ECG  Rhythm: sinus rhythm and sinus bradycardia  Rate: normal  BPM: 53  Conduction: left anterior fascicular block  QRS axis: left    Clinical impression: abnormal EKG        Assessment and Plan  Diagnoses and all orders for this visit:    Autonomic orthostatic hypotension- Doing good rarely has some dizziness BP is 130.80 lying and standing 130.80 on Fludrocortisone 0,1     Essential hypertension- BP is fine 130.80 on Low dose Toprol xl 25 mg, and Losartan 50 mg    Paroxysmal atrial fibrillation- in sinus doing good, no major complaints on Flecainide 100 mg bid, and will start warfarin, can not afford Xarelto    Other orders  -     aspirin 81 MG EC tablet; Take 1 tablet by mouth Daily.         No follow-ups on file.    Adiel Kang MD  09/21/2023

## 2023-09-25 ENCOUNTER — ANTICOAGULATION VISIT (OUTPATIENT)
Dept: PHARMACY | Facility: HOSPITAL | Age: 78
End: 2023-09-25

## 2023-09-25 ENCOUNTER — TELEPHONE (OUTPATIENT)
Dept: CARDIOLOGY | Facility: CLINIC | Age: 78
End: 2023-09-25

## 2023-09-25 LAB
INR PPP: 1.2 (ref 0.91–1.09)
PROTHROMBIN TIME: 13.8 SECONDS (ref 10–13.8)

## 2023-09-25 PROCEDURE — G0463 HOSPITAL OUTPT CLINIC VISIT: HCPCS

## 2023-09-25 PROCEDURE — 36416 COLLJ CAPILLARY BLOOD SPEC: CPT

## 2023-09-25 PROCEDURE — 85610 PROTHROMBIN TIME: CPT

## 2023-09-25 RX ORDER — TETRACYCLINE HCL 500 MG
CAPSULE ORAL
COMMUNITY

## 2023-09-25 NOTE — TELEPHONE ENCOUNTER
PT CALLED, WANTING TO CHECK AND SEE IF SHE STILL NEEDS TO TAKE ASPRIN - SHE IS UNCLEAR - SEE IT MARKED THAT SHE STATED SHE IS NOT TAKING IT AS OF TODAY 9/25/23

## 2023-09-25 NOTE — TELEPHONE ENCOUNTER
Spoke with Ms. Bacon and advised that since she is taking coumadin now she no longers needs the aspirin. She verbalized understanding.

## 2023-09-25 NOTE — PROGRESS NOTES
"Anticoagulation Clinic - Remote Progress Note   REMOTE LAB    Indication: paroxysmal Afib  Referring Provider: Pearl (9/21/2023)   Initial Warfarin Start Date: 9/21/2023  Goal INR: 2.0-3.0  Current Drug Interactions: none  [id number]: 4 (Age, Gender, HTN)  Bleed Risk: HAS-BLED: 2 (HTN, Age <65)  Other: xarelto cost effective    Diet:  green beans occasionally   Alcohol: None  Tobacco: None  OTC Pain Medication: APAP    INR History:  Date 9/25         Total Weekly Dose 13.5mg         INR 1.2         Notes            Phone Interview:  Tablet Strength: 3mg   Patient Contact Info: [phone]  Verbal Release Authorization signed on 9/25/2023 -- may speak with Ama Alcocer ([phone])   Lab Contact Info: Dr. Kang's office    Patient Findings  Negatives: Signs/symptoms of thrombosis, Signs/symptoms of bleeding, Laboratory test error suspected, Change in health, Change in alcohol use, Change in activity, Upcoming invasive procedure, Emergency department visit, Upcoming dental procedure, Missed doses, Extra doses, Change in medications, Change in diet/appetite, Hospital admission, Bruising, Other complaints   Comments: New referral from Dr. Kang. PAF- She can not afford the xarelto, has stop it. She was started on 3mg of warfarin and instructed to take 4.5mg daily.      Per Dr. Kang note- \"start taking Asa\" Pt understood to stop taking aspirin. She will call their office to confirm.       Welcomed Nesha Dorothy Bacon to the [facility]. I introduced myself and discussed that we will assist with her warfarin monitoring and work with her cardiologist or other physicians to provide patient care. Encouraged patient to call the clinic with requests for warfarin refills, changes in medications / diet, change in health, or upcoming procedures / surgeries. Discussed signs and symptoms of bleeding, signs and symptoms of TIA / CVA, use of OTC pain medications, and alcohol / tobacco / dietary / other " drug interactions in relation to warfarin. Explained that she will be testing her INR more frequently in these first few weeks of therapy as we try to adjust her dose and achieve a therapeutic INR x 2 consecutive readings. Once that is achieved, pt will follow up at remote lab every 4 weeks, on average. Furthermore, we discussed available dates to come to the [facility] for face to face meetings. Patient was agreeable to meeting twice per year. At this time, Nesha Bacon did not have further questions or concerns. Provided patient with the clinic's contact information for future reference.      Plan:  1. INR is SUBtherapeutic following three doses. Instructed pt to boost dose to 6mg tonight then continue 4.5mg Daily.  2. Repeat INR Friday at Dr. Kang's office.  3. Pt declines warfarin refills.  4. Verbal and written information provided in the clinic to Nesha Bacon and daughter. Nesha Bacon and daughter expresses understanding by teach back, RBV dosing instructions, and has no further questions at this time.    Adela Wang, PharmD  9/25/2023  14:31 EDT

## 2023-10-02 ENCOUNTER — ANTICOAGULATION VISIT (OUTPATIENT)
Dept: PHARMACY | Facility: HOSPITAL | Age: 78
End: 2023-10-02
Payer: MEDICARE

## 2023-10-02 ENCOUNTER — CLINICAL SUPPORT (OUTPATIENT)
Dept: CARDIOLOGY | Facility: CLINIC | Age: 78
End: 2023-10-02
Payer: MEDICARE

## 2023-10-02 ENCOUNTER — TELEPHONE (OUTPATIENT)
Dept: PHARMACY | Facility: HOSPITAL | Age: 78
End: 2023-10-02
Payer: MEDICARE

## 2023-10-02 DIAGNOSIS — I48.11 LONGSTANDING PERSISTENT ATRIAL FIBRILLATION: ICD-10-CM

## 2023-10-02 DIAGNOSIS — I48.0 PAROXYSMAL ATRIAL FIBRILLATION: Primary | ICD-10-CM

## 2023-10-02 LAB — INR PPP: 1.5 (ref 0.9–1.1)

## 2023-10-02 PROCEDURE — 36416 COLLJ CAPILLARY BLOOD SPEC: CPT | Performed by: INTERNAL MEDICINE

## 2023-10-02 NOTE — TELEPHONE ENCOUNTER
Contacted Ms. Bacon regarding overdue PT/INR (due Friday, 9/29)   New warfarin start.  She stated that she forgot but she will go today.    Viviana Lei, PharmD

## 2023-10-02 NOTE — PROGRESS NOTES
Capillary Blood Specimen Collection  Capillary blood collection performed in clinic by Jenn Simms MA. Patient tolerated the procedure well without complications.   10/02/23   Jenn Simms MA

## 2023-10-02 NOTE — PROGRESS NOTES
Anticoagulation Clinic - Remote Progress Note   REMOTE LAB    Indication: paroxysmal Afib  Referring Provider: Pearl (9/21/2023)   Initial Warfarin Start Date: 9/21/2023  Goal INR: 2.0-3.0  Current Drug Interactions: crestor, florinef  [id number]: 4 (Age, Gender, HTN)  Bleed Risk: HAS-BLED: 2 (HTN, Age <65)  Other: xarelto too expensive    Diet:  green beans occasionally   Alcohol: None  Tobacco: None  OTC Pain Medication: APAP    INR History:  Date 9/25 10/2               Total Weekly Dose 13.5mg 33 mg               INR 1.2 1.5               Notes                   Phone Interview:  Tablet Strength: 3mg   Patient Contact Info: [phone]  Verbal Release Authorization signed on 9/25/2023 -- may speak with Ama Alcocer ([phone])   Lab Contact Info: Dr. Kang's office    Patient Findings  Positives: Change in medications, Other complaints   Negatives: Signs/symptoms of thrombosis, Signs/symptoms of bleeding, Laboratory test error suspected, Change in health, Change in alcohol use, Change in activity, Upcoming invasive procedure, Emergency department visit, Upcoming dental procedure, Missed doses, Extra doses, Change in diet/appetite, Hospital admission, Bruising   Comments: Dr Kang confirmed that Ms. Bacon is to stop taking aspirin.  Ms. Bacon had forgotten to have her INR checked on Friday.  She believes that her calcium contains vitamin K  She just has green beans once weekly.  Otherwise, above findings negative     Plan:    1. INR is SUB therapeutic today at 1.5. Instructed Ms. Bacon to boost to warfarin 6mg tonight and tomorrow, 4.5 mg Wednesday and recheck on Thursday.     2. Repeat INR Thursday, 10/5, at Dr. Kang's office.  3. Pt declines warfarin refills.  4. Verbal information provided over the phone to Nesha Bacon. Nesha Bacon expresses understanding by teach back, RBV dosing instructions, and has no further questions at this time.    Viviana Lei,  PharmD  10/2/2023  13:42 EDT

## 2023-10-05 ENCOUNTER — ANTICOAGULATION VISIT (OUTPATIENT)
Dept: PHARMACY | Facility: HOSPITAL | Age: 78
End: 2023-10-05
Payer: MEDICARE

## 2023-10-05 ENCOUNTER — CLINICAL SUPPORT (OUTPATIENT)
Dept: CARDIOLOGY | Facility: CLINIC | Age: 78
End: 2023-10-05
Payer: MEDICARE

## 2023-10-05 DIAGNOSIS — I48.0 PAROXYSMAL ATRIAL FIBRILLATION: Primary | ICD-10-CM

## 2023-10-05 LAB — INR PPP: 1.8 (ref 0.9–1.1)

## 2023-10-05 RX ORDER — WARFARIN SODIUM 3 MG/1
TABLET ORAL
Qty: 100 TABLET | Refills: 0 | Status: SHIPPED | OUTPATIENT
Start: 2023-10-05

## 2023-10-05 NOTE — PROGRESS NOTES
Anticoagulation Clinic - Remote Progress Note   REMOTE LAB    Indication: paroxysmal Afib  Referring Provider: Pearl (9/21/2023)   Initial Warfarin Start Date: 9/21/2023  Goal INR: 2.0-3.0  Current Drug Interactions: crestor, florinef  [id number]: 4 (Age, Gender, HTN)  Bleed Risk: HAS-BLED: 2 (HTN, Age <65)  Other: xarelto too expensive    Diet:  green beans occasionally   Alcohol: None  Tobacco: None  OTC Pain Medication: APAP    INR History:  Date 9/25 10/2 10/5              Total Weekly Dose 13.5mg 33 mg 34.5 mg              INR 1.2 1.5 1.8              Notes   boost                Phone Interview:  Tablet Strength: 3mg   Patient Contact Info: [phone]  Verbal Release Authorization signed on 9/25/2023 -- may speak with Ama Alcocer ([phone])   Lab Contact Info: Dr. Kang's office    Patient Findings  Negatives: Signs/symptoms of thrombosis, Signs/symptoms of bleeding, Laboratory test error suspected, Change in health, Change in alcohol use, Change in activity, Upcoming invasive procedure, Emergency department visit, Upcoming dental procedure, Missed doses, Extra doses, Change in medications, Change in diet/appetite, Hospital admission, Bruising, Other complaints   Comments: Above findings negative    Encouraged her to record her doses in a calendar to help her keep track of dosing.  She was at her sister's house, so she wrote the doses down on paper.     Plan:    1. INR is SUB therapeutic today at 1.8, though improving (goal 2 to 3). Instructed Ms. Bacon to increase regimen to warfarin 4.5 mg oral daily except 6 mg on TueThuSat until recheck (36 mg/week, 4.3% increase)    2. Repeat INR Tuesday, 10/10 at Dr. Kang's office.  3. Pt declines warfarin refills.  4. Verbal information provided over the phone to Nesha Bacon. Nesha Bacon expresses understanding by teach back, RBV dosing instructions, and has no further questions at this time.  5. Rx sent as requested with updated  directions.    Viviana Lei, PharmD  10/5/2023  11:26 EDT

## 2023-10-05 NOTE — PROGRESS NOTES
Capillary Blood Specimen Collection  Capillary blood collection performed in clinic by Sarai Estrada MA. Patient tolerated the procedure well without complications.   10/05/23   Sarai Estrada MA

## 2023-10-10 ENCOUNTER — CLINICAL SUPPORT (OUTPATIENT)
Dept: CARDIOLOGY | Facility: CLINIC | Age: 78
End: 2023-10-10
Payer: MEDICARE

## 2023-10-10 ENCOUNTER — ANTICOAGULATION VISIT (OUTPATIENT)
Dept: PHARMACY | Facility: HOSPITAL | Age: 78
End: 2023-10-10
Payer: MEDICARE

## 2023-10-10 DIAGNOSIS — I48.0 PAROXYSMAL ATRIAL FIBRILLATION: Primary | ICD-10-CM

## 2023-10-10 LAB — INR PPP: 1.9 (ref 0.9–1.1)

## 2023-10-10 PROCEDURE — 36416 COLLJ CAPILLARY BLOOD SPEC: CPT | Performed by: INTERNAL MEDICINE

## 2023-10-10 NOTE — PROGRESS NOTES
Anticoagulation Clinic - Remote Progress Note   REMOTE LAB    Indication: paroxysmal Afib  Referring Provider: Pearl (9/21/2023)   Initial Warfarin Start Date: 9/21/2023  Goal INR: 2.0-3.0  Current Drug Interactions: crestor, florinef  [id number]: 4 (Age, Gender, HTN)  Bleed Risk: HAS-BLED: 2 (HTN, Age <65)  Other: xarelto too expensive    Diet:  green beans occasionally   Alcohol: None  Tobacco: None  OTC Pain Medication: APAP    INR History:  Date 9/25 10/2 10/5 10/10             Total Weekly Dose 13.5mg 33 mg 34.5 mg 36 mg             INR 1.2 1.5 1.8 1.9             Notes   boost                Phone Interview:  Tablet Strength: 3mg   Patient Contact Info: [phone]  Verbal Release Authorization signed on 9/25/2023 -- may speak with Ama Dialloley ([phone])   Lab Contact Info: Dr. Kang's office    Patient Findings      Negatives:  Signs/symptoms of thrombosis, Signs/symptoms of bleeding, Laboratory test error suspected, Change in health, Change in alcohol use, Change in activity, Upcoming invasive procedure, Emergency department visit, Upcoming dental procedure, Missed doses, Extra doses, Change in medications, Change in diet/appetite, Hospital admission, Bruising, Other complaints       Plan:    1. INR is SUB therapeutic today at 1.9, though improving (goal 2 to 3). Instructed Ms. Bacon to continue increased regimen of warfarin 4.5 mg oral daily except 6 mg on TueThuSat until recheck (36 mg/week    2. Repeat INR Tuesday, 10/17 at Dr. Kang's office.  3. Pt declines warfarin refills.  4. Verbal information provided over the phone to Nesha Bacon. Nesha Bacon expresses understanding by teach back, RBV dosing instructions, and has no further questions at this time.    Bette Rose, NgoziD.  10/10/23   11:27 EDT

## 2023-10-10 NOTE — PROGRESS NOTES
Capillary Blood Specimen Collection  Capillary blood collection performed in clinic by Jackie Renteria MA. Patient tolerated the procedure well without complications.   10/10/23   Jackie Renteria MA

## 2023-10-17 ENCOUNTER — ANTICOAGULATION VISIT (OUTPATIENT)
Dept: PHARMACY | Facility: HOSPITAL | Age: 78
End: 2023-10-17
Payer: MEDICARE

## 2023-10-17 ENCOUNTER — CLINICAL SUPPORT (OUTPATIENT)
Dept: CARDIOLOGY | Facility: CLINIC | Age: 78
End: 2023-10-17
Payer: MEDICARE

## 2023-10-17 DIAGNOSIS — I48.0 PAROXYSMAL ATRIAL FIBRILLATION: Primary | ICD-10-CM

## 2023-10-17 LAB — INR PPP: 2 (ref 0.9–1.1)

## 2023-10-17 NOTE — PROGRESS NOTES
Anticoagulation Clinic - Remote Progress Note   REMOTE LAB    Indication: paroxysmal Afib  Referring Provider: Pearl (9/21/2023)   Initial Warfarin Start Date: 9/21/2023  Goal INR: 2.0-3.0  Current Drug Interactions: crestor, florinef  [id number]: 4 (Age, Gender, HTN)  Bleed Risk: HAS-BLED: 2 (HTN, Age <65)  Other: xarelto too expensive    Diet:  green beans occasionally   Alcohol: None  Tobacco: None  OTC Pain Medication: APAP    INR History:  Date 9/25 10/2 10/5 10/10 10/17            Total Weekly Dose 13.5mg 33 mg 34.5 mg 36 mg 36mg            INR 1.2 1.5 1.8 1.9 2.0            Notes   boost                Phone Interview:  Tablet Strength: 3mg   Patient Contact Info: [phone]  Verbal Release Authorization signed on 9/25/2023 -- may speak with Ama Dialloley ([phone])   Lab Contact Info: Dr. Kang's office    Patient Findings  Negatives: Signs/symptoms of thrombosis, Signs/symptoms of bleeding, Laboratory test error suspected, Change in health, Change in alcohol use, Change in activity, Upcoming invasive procedure, Emergency department visit, Upcoming dental procedure, Missed doses, Extra doses, Change in medications, Change in diet/appetite, Hospital admission, Bruising, Other complaints   Comments: Pt denies any issue with bleeding or bruising, with no changes in medications. No missed or extra doses this past week.     Plan:  1. INR is therapeutic today at 2.0 (goal 2 to 3). Instructed Ms. Bacon to start warfarin 6 mg PO QD except 4.5 mg MWF  2. Repeat INR Tuesday, 10/24 at Dr. Kang's office.  3. Pt declines warfarin refills.  4. Verbal information provided over the phone to Nesha Bacon. Nesha Bacon expresses understanding by teach back, RBV dosing instructions, and has no further questions at this time.    Trinidad See, Pharmacy Intern  10/17/23  11:18 EDT     Given 0.2 INR increase with 2mg increase, and newer start to warfarin, will increase dose today to aim for middle  of range.  I, Adela Wang, PharmD, have reviewed the note in full and agree with the assessment and plan.  10/17/23  11:49 EDT

## 2023-10-17 NOTE — PROGRESS NOTES
Capillary Blood Specimen Collection  Capillary blood collection performed in clinic by Jackie Renteria MA. Patient tolerated the procedure well without complications.   10/17/23   Jackie Renteria MA  
3-5x/week

## 2023-10-24 ENCOUNTER — CLINICAL SUPPORT (OUTPATIENT)
Dept: CARDIOLOGY | Facility: CLINIC | Age: 78
End: 2023-10-24
Payer: MEDICARE

## 2023-10-24 ENCOUNTER — ANTICOAGULATION VISIT (OUTPATIENT)
Dept: PHARMACY | Facility: HOSPITAL | Age: 78
End: 2023-10-24
Payer: MEDICARE

## 2023-10-24 DIAGNOSIS — I48.0 PAROXYSMAL ATRIAL FIBRILLATION: Primary | ICD-10-CM

## 2023-10-24 LAB — INR PPP: 1.6 (ref 0.9–1.1)

## 2023-10-24 NOTE — PROGRESS NOTES
Anticoagulation Clinic - Remote Progress Note   REMOTE LAB    Indication: paroxysmal Afib  Referring Provider: Pearl (9/21/2023)   Initial Warfarin Start Date: 9/21/2023  Goal INR: 2.0-3.0  Current Drug Interactions: crestor, florinef  [id number]: 4 (Age, Gender, HTN)  Bleed Risk: HAS-BLED: 2 (HTN, Age <65)  Other: xarelto too expensive    Diet:  green beans occasionally   Alcohol: None  Tobacco: None  OTC Pain Medication: APAP    INR History:  Date 9/25 10/2 10/5 10/10 10/17 10/24           Total Weekly Dose 13.5mg 33 mg 34.5 mg 36 mg 36mg 36 mg            INR 1.2 1.5 1.8 1.9 2.0 1.6           Notes   boost                Phone Interview:  Tablet Strength: 3mg   Patient Contact Info: [phone]  Verbal Release Authorization signed on 9/25/2023 -- may speak with Ama Leodan ([phone])   Lab Contact Info: Dr. Kang's office    Patient Findings  Positives: Missed doses   Negatives: Signs/symptoms of thrombosis, Signs/symptoms of bleeding, Laboratory test error suspected, Change in health, Change in alcohol use, Change in activity, Upcoming invasive procedure, Emergency department visit, Upcoming dental procedure, Extra doses, Change in medications, Change in diet/appetite, Hospital admission, Bruising, Other complaints   Comments: Pt took 6 mg WF and 4.5 mg all other days (flipped dosing schedule than what we had in planner). Pt has taken 36 mg this past week, which is congruent with past encounters. No change in diet- still avoids greens with no supplement drinks. Pt did not miss any of her doses. Pt notes no s/s of bleeding, bruising, or thrombotic event. Discussed to monitor for s/sx of bleeding including epistaxis, hematuria, unusual bruising, hemoptysis, hematochezia as well as s/sx of stroke including impaired speech, unilateral paralysis, blurry vision and when to seek medical attention     Plan:  1. INR is therapeutic today at 2.0 (goal 2 to 3). Instructed Ms. Bacon to start warfarin 6 mg PO QD  except 4.5 mg SunThurs (39 mg/week, 8% increase from previous week dose)  2. Repeat INR Tuesday, 10/31 at Dr. Kang's office.  3. Pt declines warfarin refills.  4. Verbal information provided over the phone to Nesha Bacon. Nesha Bacon expresses understanding by teach back, RBV dosing instructions, and has no further questions at this time.    Trinidad See, Pharmacy Intern  10/24/23  10:52 EDT       I, Charmaine Faith, [facility], have reviewed the note in full and agree with the assessment and plan.  10/24/23  15:20 EDT

## 2023-10-24 NOTE — PROGRESS NOTES
Capillary Blood Specimen Collection  Capillary blood collection performed in clinic by Jackie Renteria MA. Patient tolerated the procedure well without complications.   10/24/23   Jackie Renteria MA

## 2023-10-31 ENCOUNTER — CLINICAL SUPPORT (OUTPATIENT)
Dept: CARDIOLOGY | Facility: CLINIC | Age: 78
End: 2023-10-31
Payer: MEDICARE

## 2023-10-31 ENCOUNTER — ANTICOAGULATION VISIT (OUTPATIENT)
Dept: PHARMACY | Facility: HOSPITAL | Age: 78
End: 2023-10-31
Payer: MEDICARE

## 2023-10-31 DIAGNOSIS — I48.0 PAROXYSMAL ATRIAL FIBRILLATION: Primary | ICD-10-CM

## 2023-10-31 DIAGNOSIS — I48.11 LONGSTANDING PERSISTENT ATRIAL FIBRILLATION: ICD-10-CM

## 2023-10-31 LAB
INR PPP: 1.6 (ref 0.9–1.1)
PROTHROMBIN TIME: 1.6 SECONDS

## 2023-10-31 PROCEDURE — 36416 COLLJ CAPILLARY BLOOD SPEC: CPT | Performed by: INTERNAL MEDICINE

## 2023-10-31 NOTE — PROGRESS NOTES
Anticoagulation Clinic - Remote Progress Note   REMOTE LAB    Indication: paroxysmal Afib  Referring Provider: Pearl (9/21/2023)   Initial Warfarin Start Date: 9/21/2023  Goal INR: 2.0-3.0  Current Drug Interactions: crestor, florinef  [id number]: 4 (Age, Gender, HTN)  Bleed Risk: HAS-BLED: 2 (HTN, Age <65)  Other: xarelto too expensive    Diet:  green beans occasionally   Alcohol: None  Tobacco: None  OTC Pain Medication: APAP    INR History:  Date 9/25 10/2 10/5 10/10 10/17 10/24 10/31          Total Weekly Dose 13.5mg 33 mg 34.5 mg 36 mg 36mg 36 mg  39 mg          INR 1.2 1.5 1.8 1.9 2.0 1.6 1.6          Notes   boost                Phone Interview:  Tablet Strength: 3mg   Patient Contact Info: [phone]  Verbal Release Authorization signed on 9/25/2023 -- may speak with Ama Alcocer ([phone])   Lab Contact Info: Dr. Kang's office    Patient Findings        Negatives:  Signs/symptoms of thrombosis, Signs/symptoms of bleeding, Laboratory test error suspected, Change in health, Change in alcohol use, Change in activity, Upcoming invasive procedure, Emergency department visit, Upcoming dental procedure, Missed doses, Extra doses, Change in medications, Change in diet/appetite, Hospital admission, Bruising, Other complaints         Plan:  1. INR is again SUBtherapeutic today at 1.6 (goal 2 to 3). Instructed Ms. Bacon to start warfarin 6 mg PO QD   2. Repeat INR Tuesday, 11/7 at Dr. Kang's office.  3. Pt declines warfarin refills.  4. Verbal information provided over the phone to Nesha Bacon. Nesha Bacon expresses understanding by teach back, RBV dosing instructions, and has no further questions at this time.    Bette Rose, PharmD  10/31/23  10:09 EDT

## 2023-10-31 NOTE — PROGRESS NOTES
Capillary Blood Specimen Collection  Capillary blood collection performed in clinic by Jenn Simms MA. Patient tolerated the procedure well without complications.   10/31/23   Jenn Simms MA

## 2023-11-01 ENCOUNTER — ANTICOAGULATION VISIT (OUTPATIENT)
Dept: PHARMACY | Facility: HOSPITAL | Age: 78
End: 2023-11-01
Payer: MEDICARE

## 2023-11-07 ENCOUNTER — CLINICAL SUPPORT (OUTPATIENT)
Dept: CARDIOLOGY | Facility: CLINIC | Age: 78
End: 2023-11-07
Payer: MEDICARE

## 2023-11-07 ENCOUNTER — ANTICOAGULATION VISIT (OUTPATIENT)
Dept: PHARMACY | Facility: HOSPITAL | Age: 78
End: 2023-11-07
Payer: MEDICARE

## 2023-11-07 DIAGNOSIS — I48.0 PAROXYSMAL ATRIAL FIBRILLATION: Primary | ICD-10-CM

## 2023-11-07 LAB — INR PPP: 1.7 (ref 0.9–1.1)

## 2023-11-07 NOTE — PROGRESS NOTES
Anticoagulation Clinic - Remote Progress Note   REMOTE LAB    Indication: paroxysmal Afib  Referring Provider: Pearl (9/21/2023)   Initial Warfarin Start Date: 9/21/2023  Goal INR: 2.0-3.0  Current Drug Interactions: crestor, florinef  [id number]: 4 (Age, Gender, HTN)  Bleed Risk: HAS-BLED: 2 (HTN, Age <65)  Other: xarelto too expensive    Diet:  green beans occasionally   Alcohol: None  Tobacco: None  OTC Pain Medication: APAP    INR History:  Date 9/25 10/2 10/5 10/10 10/17 10/24 10/31 11/7         Total Weekly Dose 13.5mg 33 mg 34.5 mg 36 mg 36mg 36 mg  39 mg 42mg         INR 1.2 1.5 1.8 1.9 2.0 1.6 1.6 1.7         Notes   boost                Phone Interview:  Tablet Strength: 3mg   Patient Contact Info: [phone]  Verbal Release Authorization signed on 9/25/2023 -- may speak with Ama Alcocer ([phone])   Lab Contact Info: Dr. Kang's office    Patient Findings    Negatives: Signs/symptoms of thrombosis, Signs/symptoms of bleeding, Laboratory test error suspected, Change in health, Change in alcohol use, Change in activity, Upcoming invasive procedure, Emergency department visit, Upcoming dental procedure, Missed doses, Extra doses, Change in medications, Change in diet/appetite, Hospital admission, Bruising, Other complaints   Comments: Ms bacon has been taking warfarin as directed. She denies any changes since her last encounter.     Plan:  1. INR is SUBtherapeutic today at 1.7 (goal 2 to 3). Instructed Ms. Bacon to increase to warfarin 6 mg daily except 9mg TueFri.  2. Repeat INR Tuesday, 11/14 at Dr. Kang's office.  3. Pt declines warfarin refills.  4. Verbal information provided over the phone to Nesha Bacon. Nesha Bacon expresses understanding by teach back, RBV dosing instructions, and has no further questions at this time.    Truman Cooley, PharmD  11/07/23  14:35 EST

## 2023-11-07 NOTE — PROGRESS NOTES
Capillary Blood Specimen Collection  Capillary blood collection performed in clinic by Sarai Estrada MA. Patient tolerated the procedure well without complications.   11/07/23   Sarai Estrada MA

## 2023-11-14 ENCOUNTER — ANTICOAGULATION VISIT (OUTPATIENT)
Dept: PHARMACY | Facility: HOSPITAL | Age: 78
End: 2023-11-14
Payer: MEDICARE

## 2023-11-14 ENCOUNTER — TELEPHONE (OUTPATIENT)
Dept: FAMILY MEDICINE CLINIC | Facility: CLINIC | Age: 78
End: 2023-11-14
Payer: MEDICARE

## 2023-11-14 ENCOUNTER — CLINICAL SUPPORT (OUTPATIENT)
Dept: CARDIOLOGY | Facility: CLINIC | Age: 78
End: 2023-11-14
Payer: MEDICARE

## 2023-11-14 DIAGNOSIS — I48.0 PAROXYSMAL ATRIAL FIBRILLATION: Primary | ICD-10-CM

## 2023-11-14 LAB — INR PPP: 2 (ref 0.9–1.1)

## 2023-11-14 NOTE — PROGRESS NOTES
Anticoagulation Clinic - Remote Progress Note   REMOTE LAB    Indication: paroxysmal Afib  Referring Provider: Pearl (9/21/2023)   Initial Warfarin Start Date: 9/21/2023  Goal INR: 2.0-3.0  Current Drug Interactions: crestor, florinef  [id number]: 4 (Age, Gender, HTN)  Bleed Risk: HAS-BLED: 2 (HTN, Age <65)  Other: xarelto too expensive    Diet:  green beans occasionally   Alcohol: None  Tobacco: None  OTC Pain Medication: APAP    INR History:  Date 9/25 10/2 10/5 10/10 10/17 10/24 10/31 11/7 11/14        Total Weekly Dose 13.5mg 33 mg 34.5 mg 36 mg 36mg 36 mg  39 mg 42mg 48 mg        INR 1.2 1.5 1.8 1.9 2.0 1.6 1.6 1.7 2.0        Notes   boost                Phone Interview:  Tablet Strength: 3mg   Patient Contact Info: [phone]  Verbal Release Authorization signed on 9/25/2023 -- may speak with Ama Alcocer ([phone])   Lab Contact Info: Dr. Kang's office    Patient Findings    Positives: Change in health   Negatives: Signs/symptoms of thrombosis, Signs/symptoms of bleeding, Laboratory test error suspected, Change in alcohol use, Change in activity, Upcoming invasive procedure, Emergency department visit, Upcoming dental procedure, Missed doses, Extra doses, Change in medications, Change in diet/appetite, Hospital admission, Bruising, Other complaints   Comments: Bad headaches for a couple of weeks now that seems to be getting worse.  Has been taking two Tylenol for the headaches but says it doesn't seem to help much.  Says the headache is throbbing and comes and goes.  Informed her to go to the ER if the headache becomes significant or worsens and to inform her primary care provider about the issue and get their opinion.     Plan:  1. INR is therapeutic today at 2.0 (goal 2 to 3). Instructed Ms. Bacon to continue warfarin 6 mg daily except 9mg TueFri.  2. Repeat INR Tuesday, 11/21.  3. Verbal information provided over the phone to Nesha Bacon. Nesha Bacon expresses understanding by  teach back, RBV dosing instructions, and has no further questions at this time.    Manish Painter  Pharmacy Intern  11/14/23 10:49 EST     If WNL can extend  I, Bette Rose, PharmD, have reviewed the note in full and agree with the assessment and plan.  11/14/23  15:31 EST

## 2023-11-14 NOTE — TELEPHONE ENCOUNTER
Caller: Nesha Bacon    Relationship to patient: Self    Best call back number: [phone]    Chief complaint: SEVERE HEADACHE, DIZZINESS    Patient directed to call 911 or go to their nearest emergency room.     Patient verbalized understanding: [x] Yes  [] No  If no, why?    Additional notes:

## 2023-11-14 NOTE — TELEPHONE ENCOUNTER
Spoke to patient she states that she will go to ER if it get worse since we dont have any openings at this time

## 2023-11-14 NOTE — PROGRESS NOTES
Capillary Blood Specimen Collection  Capillary blood collection performed in clinic by Sarai Estrada MA. Patient tolerated the procedure well without complications.   11/14/23   Sarai Estrada MA

## 2023-11-17 RX ORDER — LOSARTAN POTASSIUM 50 MG/1
50 TABLET ORAL DAILY
Qty: 90 TABLET | Refills: 0 | Status: SHIPPED | OUTPATIENT
Start: 2023-11-17

## 2023-11-21 ENCOUNTER — ANTICOAGULATION VISIT (OUTPATIENT)
Dept: PHARMACY | Facility: HOSPITAL | Age: 78
End: 2023-11-21
Payer: MEDICARE

## 2023-11-21 ENCOUNTER — CLINICAL SUPPORT (OUTPATIENT)
Dept: CARDIOLOGY | Facility: CLINIC | Age: 78
End: 2023-11-21
Payer: MEDICARE

## 2023-11-21 DIAGNOSIS — I95.1 AUTONOMIC ORTHOSTATIC HYPOTENSION: ICD-10-CM

## 2023-11-21 DIAGNOSIS — I10 ESSENTIAL HYPERTENSION: ICD-10-CM

## 2023-11-21 DIAGNOSIS — I48.11 LONGSTANDING PERSISTENT ATRIAL FIBRILLATION: ICD-10-CM

## 2023-11-21 DIAGNOSIS — R00.2 PALPITATIONS: ICD-10-CM

## 2023-11-21 DIAGNOSIS — I48.0 PAROXYSMAL ATRIAL FIBRILLATION: Primary | ICD-10-CM

## 2023-11-21 LAB
INR PPP: 2.4 (ref 0.9–1.1)
PROTHROMBIN TIME: 2.4 SECONDS

## 2023-11-21 NOTE — PROGRESS NOTES
Capillary Blood Specimen Collection  Capillary blood collection performed in clinic by Jenn Simms MA. Patient tolerated the procedure well without complications.   11/21/23   Jenn Simms MA

## 2023-11-21 NOTE — PROGRESS NOTES
Anticoagulation Clinic - Remote Progress Note   REMOTE LAB    Indication: paroxysmal Afib  Referring Provider: Pearl (9/21/2023)   Initial Warfarin Start Date: 9/21/2023  Goal INR: 2.0-3.0  Current Drug Interactions: crestor, florinef  [id number]: 4 (Age, Gender, HTN)  Bleed Risk: HAS-BLED: 2 (HTN, Age <65)  Other: xarelto too expensive    Diet:  green beans occasionally   Alcohol: None  Tobacco: None  OTC Pain Medication: APAP    INR History:  Date 9/25 10/2 10/5 10/10 10/17 10/24 10/31 11/7 11/14 11/21       Total Weekly Dose 13.5mg 33 mg 34.5 mg 36 mg 36mg 36 mg  39 mg 42mg 48 mg 48mg       INR 1.2 1.5 1.8 1.9 2.0 1.6 1.6 1.7 2.0 2.4       Notes   boost                Phone Interview:  Tablet Strength: 3mg   Patient Contact Info: [phone]  Verbal Release Authorization signed on 9/25/2023 -- may speak with Ama Alcocer ([phone])   Lab Contact Info: Dr. Kang's office      Patient Findings  Positives: Other complaints   Negatives: Signs/symptoms of thrombosis, Signs/symptoms of bleeding, Laboratory test error suspected, Change in health, Change in alcohol use, Change in activity, Upcoming invasive procedure, Emergency department visit, Upcoming dental procedure, Missed doses, Extra doses, Change in medications, Change in diet/appetite, Hospital admission, Bruising   Comments: Bad headaches that have been worse than before sometimes. Throbbing headache and shooting pain every day in the back of head.  Patient has been taking Tylenol daily for this.  She said it can interfere with her every day life. She reports dizzy feeling and some blurred vision changes at times. She reports this has been going on for three weeks and it gets worse at times. Advised her to go seek medical attention at an urgent care or ED.    Advised her to seek immediate medical attention as well for falls if they occur. Discussed risks associated with falls and dizziness      Plan:  1. INR is therapeutic today at 2.4 (goal 2 to  3). Instructed Ms. Bacon to continue warfarin 6 mg daily except 9mg TueFri.  2. Repeat INR Thursday 11/30. If WNL consider repeat in 2-3 weeks.   3. Advised her to seek medical attention in ED or Urgent care due to headaches. She has an appointment with PCP in December and PCP advised to seek care in ED if headaches change.  3. Verbal information provided over the phone to Nesha Bacon. Nesha Bacon expresses understanding by teach back, RBV dosing instructions, and has no further questions at this time.    Adela Wang, PharmD  Pharmacy Intern  11/21/23 09:51 EST

## 2023-11-22 RX ORDER — WARFARIN SODIUM 3 MG/1
TABLET ORAL
Qty: 100 TABLET | Refills: 0 | Status: SHIPPED | OUTPATIENT
Start: 2023-11-22

## 2023-11-27 ENCOUNTER — TELEPHONE (OUTPATIENT)
Dept: CARDIOLOGY | Facility: CLINIC | Age: 78
End: 2023-11-27
Payer: MEDICARE

## 2023-11-27 NOTE — TELEPHONE ENCOUNTER
Caller: Nesha Bacon    Relationship to patient: Self    Best call back number: [phone]     Chief complaint: MED CHANGE     Type of visit: F/U     Requested date: UNKNOWN      Additional notes: PT WAS IN THE [facility] HOS ON 11/21- 11/23 , WAS TAKING warfarin (COUMADIN) 3 MG tablet [[id number]] (Order [id number]), THEN WAS PUT ON XARELTO, TAKEN OFF THE WARFARIN. PT IS WONDERING IF SHE NEEDS TO BE SEEN FOR A MED CHANGE.     PT STATES HER BP WAS HIGH IN THE HOS, BUT WAS NOT TOLD TO F/U WITH CARD.

## 2023-11-28 ENCOUNTER — OFFICE VISIT (OUTPATIENT)
Dept: CARDIOLOGY | Facility: CLINIC | Age: 78
End: 2023-11-28
Payer: MEDICARE

## 2023-11-28 VITALS
BODY MASS INDEX: 25.49 KG/M2 | SYSTOLIC BLOOD PRESSURE: 140 MMHG | HEIGHT: 65 IN | DIASTOLIC BLOOD PRESSURE: 80 MMHG | HEART RATE: 59 BPM | OXYGEN SATURATION: 99 % | RESPIRATION RATE: 16 BRPM | WEIGHT: 153 LBS

## 2023-11-28 DIAGNOSIS — I95.1 AUTONOMIC ORTHOSTATIC HYPOTENSION: Primary | ICD-10-CM

## 2023-11-28 DIAGNOSIS — I10 ESSENTIAL HYPERTENSION: ICD-10-CM

## 2023-11-28 DIAGNOSIS — I48.0 PAROXYSMAL ATRIAL FIBRILLATION: ICD-10-CM

## 2023-11-28 DIAGNOSIS — E78.00 HYPERCHOLESTEROLEMIA: ICD-10-CM

## 2023-11-28 PROCEDURE — 3079F DIAST BP 80-89 MM HG: CPT | Performed by: INTERNAL MEDICINE

## 2023-11-28 PROCEDURE — 3077F SYST BP >= 140 MM HG: CPT | Performed by: INTERNAL MEDICINE

## 2023-11-28 PROCEDURE — 99214 OFFICE O/P EST MOD 30 MIN: CPT | Performed by: INTERNAL MEDICINE

## 2023-11-28 PROCEDURE — 93000 ELECTROCARDIOGRAM COMPLETE: CPT | Performed by: INTERNAL MEDICINE

## 2023-11-28 PROCEDURE — 1159F MED LIST DOCD IN RCRD: CPT | Performed by: INTERNAL MEDICINE

## 2023-11-28 PROCEDURE — 1160F RVW MEDS BY RX/DR IN RCRD: CPT | Performed by: INTERNAL MEDICINE

## 2023-11-28 RX ORDER — LOSARTAN POTASSIUM 100 MG/1
100 TABLET ORAL DAILY
Qty: 90 TABLET | Refills: 3 | Status: SHIPPED | OUTPATIENT
Start: 2023-11-28

## 2023-11-28 NOTE — PROGRESS NOTES
[facility] CARDIOLOGY  [address] [place] [address]  Dept: [phone]  Dept Fax: [phone]    Nesha Bacon  1945    Follow Up Office Visit Note    History of Present Illness:  Nesha Bacon is a 78 y.o. female who presents to the clinic for Follow-up.PAF- She has done well cardiac wise no palpitations, no dizziness on flecainide 50 mg bid and also Toprol xl 25 mg and now back to Xarelto 20 mg, was at the hospital at [place] with headache and was sent to a neurologist, seems the Ct head has some abnormalities    The following portions of the patient's history were reviewed and updated as appropriate: allergies, current medications, past family history, past medical history, past social history, past surgical history, and problem list.    Medications:  calcium carbonate  flecainide  fludrocortisone  losartan  metoprolol succinate XL  potassium chloride  pramipexole  rivaroxaban  rosuvastatin    Subjective  Allergies   Allergen Reactions   • Aspirin Palpitations     Chest tightness   • Latex Rash        Past Medical History:   Diagnosis Date   • Acute cystitis without hematuria    • Age-related osteoporosis without current pathological fracture    • Back pain    • BMI 20.0-20.9, adult    • Bone injury     BONE OR JOINT INJURIES   • Chronic fatigue    • Chronic obstructive pulmonary disease    • Closed fracture of left hip, with routine healing, subsequent encounter    • Lightheadedness     CHRONIC   • Paroxysmal atrial fibrillation    • Postoperative anemia    • Pure hypercholesterolemia    • Renal cancer    • Shortness of breath        Past Surgical History:   Procedure Laterality Date   • CATARACT EXTRACTION, BILATERAL     • CHOLECYSTECTOMY     • HIP FRACTURE SURGERY     • LUMBAR LAMINECTOMY     • NEPHRECTOMY PARTIAL     • TUBAL ABDOMINAL LIGATION     • VAGINAL HYSTERECTOMY SALPINGO OOPHORECTOMY WITH ANTERIOR AND POSTERIOR VAGINAL REPAIR    "      Family History   Problem Relation Age of Onset   • Stroke Mother    • Hypertension Mother    • Coronary artery disease Brother         Social History     Socioeconomic History   • Marital status: Single   Tobacco Use   • Smoking status: Never   • Smokeless tobacco: Never   Vaping Use   • Vaping Use: Never used   Substance and Sexual Activity   • Alcohol use: Not Currently   • Drug use: Never   • Sexual activity: Defer       Review of Systems   Constitutional: Negative.    HENT: Negative.     Respiratory: Negative.     Cardiovascular: Negative.    Endocrine: Negative.    Genitourinary: Negative.    Musculoskeletal: Negative.    Skin: Negative.    Allergic/Immunologic: Negative.    Neurological: Negative.    Hematological: Negative.    Psychiatric/Behavioral: Negative.       Cardiovascular Procedures    ECHO/MUGA:  STRESS TESTS:   CARDIAC CATH:   DEVICES:   HOLTER:   CT/MRI:   VASCULAR:   CARDIOTHORACIC:     Objective  Vitals:    11/28/23 1259 11/28/23 1328   BP: 160/78 140/80   BP Location: Left arm    Patient Position: Sitting    Cuff Size: Adult    Pulse: 59    Resp: 16    SpO2: 99%    Weight: 69.4 kg (153 lb)    Height: 165.1 cm (65\")    PainSc:   6    PainLoc: Head      Body mass index is 25.46 kg/m².     Physical Exam  Vitals reviewed.   Constitutional:       Appearance: Healthy appearance. Not in distress.   Neck:      Vascular: No JVR. JVD normal.   Pulmonary:      Effort: Pulmonary effort is normal.      Breath sounds: Normal breath sounds. No wheezing. No rhonchi. No rales.   Chest:      Chest wall: Not tender to palpatation.   Cardiovascular:      PMI at left midclavicular line. Normal rate. Regular rhythm. Normal S1. Normal S2.       Murmurs: There is no murmur.      No gallop.  No click. No rub.   Pulses:     Intact distal pulses.   Edema:     Peripheral edema absent.   Abdominal:      General: Bowel sounds are normal.      Palpations: Abdomen is soft.      Tenderness: There is no abdominal " tenderness.   Musculoskeletal: Normal range of motion.         General: No tenderness. Skin:     General: Skin is warm and dry.   Neurological:      General: No focal deficit present.      Mental Status: Alert and oriented to person, place and time.        Diagnostic Data    ECG 12 Lead    Date/Time: 11/28/2023 5:18 PM  Performed by: Adiel Kang MD    Authorized by: Adiel Kang MD  Comparison: compared with previous ECG from 9/21/2023  Similar to previous ECG  Rhythm: sinus rhythm and sinus bradycardia  Rate: bradycardic  BPM: 56  QRS axis: left    Clinical impression: abnormal EKG        Assessment and Plan  Diagnoses and all orders for this visit:    Autonomic orthostatic hypotension- Doing good no complaints on Fludrocortisone 0,1 daily  -     Cancel: Aldosterone / Renin Ratio; Future  -     Sedimentation Rate  -     Aldosterone / Renin Ratio  -     Basic Metabolic Panel    Essential hypertension- BP is 150.80, On Losartan 50 mg and also toprol xl 25mg, will increase the Losartan to 100 mg  -     Cancel: Aldosterone / Renin Ratio; Future  -     Sedimentation Rate  -     Aldosterone / Renin Ratio  -     Basic Metabolic Panel    Hypercholesterolemia- On Crestor 5 mg tolerating  well, we need a lipid profile  -     Sedimentation Rate  -     Basic Metabolic Panel    Paroxysmal atrial fibrillation- Asymptomatic in sinus on flecainide 50 mg bid and also Toprol xl 25mg plus xarelto 20 mg  -     Sedimentation Rate  -     Basic Metabolic Panel    Other orders  -     rivaroxaban (XARELTO) 20 MG tablet; Take 1 tablet by mouth Daily.  -     losartan (COZAAR) 100 MG tablet; Take 1 tablet by mouth Daily.         Return in about 6 months (around 5/28/2024) for Recheck with Dr. Kang.    Adiel Kang MD  11/28/2023

## 2023-11-29 LAB
BUN SERPL-MCNC: 17 MG/DL (ref 8–27)
BUN/CREAT SERPL: 21 (ref 12–28)
CALCIUM SERPL-MCNC: 9.4 MG/DL (ref 8.7–10.3)
CHLORIDE SERPL-SCNC: 105 MMOL/L (ref 96–106)
CO2 SERPL-SCNC: 26 MMOL/L (ref 20–29)
CREAT SERPL-MCNC: 0.81 MG/DL (ref 0.57–1)
EGFRCR SERPLBLD CKD-EPI 2021: 74 ML/MIN/1.73
ERYTHROCYTE [SEDIMENTATION RATE] IN BLOOD BY WESTERGREN METHOD: 20 MM/HR (ref 0–40)
GLUCOSE SERPL-MCNC: 74 MG/DL (ref 70–99)
POTASSIUM SERPL-SCNC: 4.1 MMOL/L (ref 3.5–5.2)
SODIUM SERPL-SCNC: 145 MMOL/L (ref 134–144)

## 2023-12-11 LAB
ALDOST SERPL-MCNC: <1 NG/DL (ref 0–30)
ALDOST/RENIN PLAS-RTO: <4.9 {RATIO} (ref 0–30)
RENIN PLAS-CCNC: 0.2 NG/ML/HR (ref 0.17–5.38)

## 2023-12-15 ENCOUNTER — TELEPHONE (OUTPATIENT)
Dept: PHARMACY | Facility: HOSPITAL | Age: 78
End: 2023-12-15

## 2023-12-15 NOTE — TELEPHONE ENCOUNTER
Contacted pt for an overdue INR result. Ms. Bacon claims she isn't on warfarin anymore, she transitioned to Eliquis around thanksgiving after hospital admission. Dr. Kang is aware      Nuno Bacon, Pharmacy Technician  12/15/2023  11:28 EST

## 2023-12-29 ENCOUNTER — TELEPHONE (OUTPATIENT)
Dept: CARDIOLOGY | Facility: CLINIC | Age: 78
End: 2023-12-29
Payer: MEDICARE

## 2023-12-29 ENCOUNTER — OFFICE VISIT (OUTPATIENT)
Dept: FAMILY MEDICINE CLINIC | Facility: CLINIC | Age: 78
End: 2023-12-29
Payer: MEDICARE

## 2023-12-29 VITALS
DIASTOLIC BLOOD PRESSURE: 70 MMHG | BODY MASS INDEX: 23.94 KG/M2 | SYSTOLIC BLOOD PRESSURE: 118 MMHG | TEMPERATURE: 97.5 F | WEIGHT: 143.7 LBS | HEIGHT: 65 IN | HEART RATE: 58 BPM | OXYGEN SATURATION: 97 % | RESPIRATION RATE: 16 BRPM

## 2023-12-29 DIAGNOSIS — R93.0 ABNORMAL CT SCAN OF HEAD: Primary | ICD-10-CM

## 2023-12-29 DIAGNOSIS — G44.52 NEW DAILY PERSISTENT HEADACHE: ICD-10-CM

## 2023-12-29 DIAGNOSIS — E87.6 HYPOKALEMIA: ICD-10-CM

## 2023-12-29 PROBLEM — R51.9 HEADACHE: Status: ACTIVE | Noted: 2023-11-21

## 2023-12-29 PROBLEM — Z98.890 HISTORY OF BACK SURGERY: Status: ACTIVE | Noted: 2021-01-28

## 2023-12-29 PROBLEM — Z90.5 HISTORY OF PARTIAL NEPHRECTOMY: Status: ACTIVE | Noted: 2021-01-28

## 2023-12-29 PROBLEM — J44.9 COPD (CHRONIC OBSTRUCTIVE PULMONARY DISEASE): Status: ACTIVE | Noted: 2021-01-28

## 2023-12-29 PROBLEM — C64.9 RENAL CELL CARCINOMA: Status: ACTIVE | Noted: 2021-01-28

## 2023-12-29 PROCEDURE — 99214 OFFICE O/P EST MOD 30 MIN: CPT | Performed by: PHYSICIAN ASSISTANT

## 2023-12-29 PROCEDURE — 1160F RVW MEDS BY RX/DR IN RCRD: CPT | Performed by: PHYSICIAN ASSISTANT

## 2023-12-29 PROCEDURE — 3074F SYST BP LT 130 MM HG: CPT | Performed by: PHYSICIAN ASSISTANT

## 2023-12-29 PROCEDURE — 3078F DIAST BP <80 MM HG: CPT | Performed by: PHYSICIAN ASSISTANT

## 2023-12-29 PROCEDURE — 1159F MED LIST DOCD IN RCRD: CPT | Performed by: PHYSICIAN ASSISTANT

## 2023-12-29 RX ORDER — POTASSIUM CHLORIDE 750 MG/1
10 CAPSULE, EXTENDED RELEASE ORAL 2 TIMES DAILY
Qty: 60 CAPSULE | Refills: 5 | Status: SHIPPED | OUTPATIENT
Start: 2023-12-29

## 2023-12-29 RX ORDER — FLUDROCORTISONE ACETATE 0.1 MG/1
0.1 TABLET ORAL DAILY
Qty: 90 TABLET | Refills: 1 | Status: SHIPPED | OUTPATIENT
Start: 2023-12-29

## 2023-12-29 NOTE — ASSESSMENT & PLAN NOTE
She was recently admitted to the hospital after a head injury possible postconcussive headache but she was on blood thinners and they were concerned that she may have a bleed the CT scan and MRI did not show any active bleeding but there were some concerns and she was advised to follow-up with a neurologist but when she called to schedule that appointment she was told she needed a referral.  She is basically here to get that referral so she can follow-up with the neurologist.  She does state that she takes Tylenol for the headaches and it has been effective.

## 2023-12-29 NOTE — ASSESSMENT & PLAN NOTE
Patient has long standing history of low potassium and she had stopped taking her potassium medication when she was admitted to the hospital recently her potassium was low and I suggested we restart her potassium supplement today and check her level today as well.

## 2023-12-29 NOTE — PROGRESS NOTES
"Answers submitted by the patient for this visit:  Other (Submitted on 12/28/2023)  Please describe your symptoms.: Bad headaches  Have you had these symptoms before?: Yes  How long have you been having these symptoms?: Greater than 2 weeks  Please list any medications you are currently taking for this condition.: Tylenol  Primary Reason for Visit (Submitted on 12/28/2023)  What is the primary reason for your visit?: Other  Chief Complaint  Headache    Subjective          Nesha Bacon presents to [facility]    Headache   patient is here today she says for a referral to neurologist about her new daily persistent headaches.  She was recently hospitalized and was told to follow-up with the neurologist, but will need a referral.  She was also told that her potassium was low again and she may need to restart her potassium medicine.    Objective   Vital Signs:   /70 (BP Location: Left arm, Patient Position: Sitting)   Pulse 58   Temp 97.5 °F (36.4 °C)   Resp 16   Ht 165.1 cm (65\")   Wt 65.2 kg (143 lb 11.2 oz)   SpO2 97%   BMI 23.91 kg/m²     Body mass index is 23.91 kg/m².    Review of Systems   Constitutional:  Negative for chills, fatigue and fever.   Respiratory:  Negative for cough and shortness of breath.    Cardiovascular:  Negative for chest pain and palpitations.   Musculoskeletal:  Negative for back pain and myalgias.   Neurological:  Positive for headache. Negative for dizziness.   Psychiatric/Behavioral:  Negative for depressed mood. The patient is not nervous/anxious.        Past History:  Medical History: has a past medical history of Acute cystitis without hematuria, Age-related osteoporosis without current pathological fracture, Back pain, BMI 20.0-20.9, adult, Bone injury, Chronic fatigue, Chronic obstructive pulmonary disease, Closed fracture of left hip, with routine healing, subsequent encounter, Lightheadedness, Paroxysmal atrial fibrillation, " Postoperative anemia, Pure hypercholesterolemia, Renal cancer, and Shortness of breath.   Surgical History: has a past surgical history that includes Partial nephrectomy; Lumbar laminectomy; vaginal hysterectomy salpingo oophorectomy with anterior and posterior vaginal repair; Cataract extraction, bilateral; Cholecystectomy; Tubal ligation; and Hip fracture surgery.   Family History: family history includes Coronary artery disease in her brother; Hypertension in her mother; Stroke in her mother.   Social History: reports that she has never smoked. She has never used smokeless tobacco. She reports that she does not drink alcohol and does not use drugs.      Current Outpatient Medications:     calcium carbonate (OS-ROLAND) 1250 (500 Ca) MG tablet, Take  by mouth Daily., Disp: , Rfl:     flecainide (TAMBOCOR) 100 MG tablet, Take 1 tablet by mouth 2 (Two) Times a Day., Disp: 180 tablet, Rfl: 3    losartan (COZAAR) 100 MG tablet, Take 1 tablet by mouth Daily., Disp: 90 tablet, Rfl: 3    metoprolol succinate XL (TOPROL-XL) 25 MG 24 hr tablet, Take 1 tablet by mouth once daily, Disp: 90 tablet, Rfl: 2    potassium chloride (MICRO-K) 10 MEQ CR capsule, Take 1 capsule by mouth 2 (Two) Times a Day., Disp: 60 capsule, Rfl: 5    pramipexole (MIRAPEX) 0.25 MG tablet, Take 1 tablet by mouth Every Night., Disp: 90 tablet, Rfl: 1    rivaroxaban (XARELTO) 20 MG tablet, Take 1 tablet by mouth Daily., Disp: , Rfl:     rosuvastatin (CRESTOR) 5 MG tablet, Take 1 tablet by mouth Daily., Disp: 90 tablet, Rfl: 3    fludrocortisone 0.1 MG tablet, Take 1 tablet by mouth Daily., Disp: 90 tablet, Rfl: 1    Allergies: Aspirin and Latex    Physical Exam  Constitutional:       Appearance: Normal appearance.   HENT:      Head: Normocephalic.   Eyes:      Extraocular Movements: Extraocular movements intact.      Pupils: Pupils are equal, round, and reactive to light.   Cardiovascular:      Rate and Rhythm: Normal rate and regular rhythm.   Pulmonary:       Effort: Pulmonary effort is normal.      Breath sounds: Normal breath sounds.   Abdominal:      General: Bowel sounds are normal.      Palpations: Abdomen is soft.   Musculoskeletal:         General: Normal range of motion.      Cervical back: Normal range of motion.   Neurological:      General: No focal deficit present.      Mental Status: She is alert and oriented to person, place, and time.   Psychiatric:         Mood and Affect: Mood normal.          Result Review :                   Assessment and Plan    Diagnoses and all orders for this visit:    1. Abnormal CT scan of head (Primary)  -     Ambulatory Referral to Neurology    2. New daily persistent headache  Assessment & Plan:  She was recently admitted to the hospital after a head injury possible postconcussive headache but she was on blood thinners and they were concerned that she may have a bleed the CT scan and MRI did not show any active bleeding but there were some concerns and she was advised to follow-up with a neurologist but when she called to schedule that appointment she was told she needed a referral.  She is basically here to get that referral so she can follow-up with the neurologist.  She does state that she takes Tylenol for the headaches and it has been effective.    Orders:  -     Ambulatory Referral to Neurology    3. Hypokalemia  Assessment & Plan:  Patient has long standing history of low potassium and she had stopped taking her potassium medication when she was admitted to the hospital recently her potassium was low and I suggested we restart her potassium supplement today and check her level today as well.    Orders:  -     Basic Metabolic Panel; Future    Other orders  -     potassium chloride (MICRO-K) 10 MEQ CR capsule; Take 1 capsule by mouth 2 (Two) Times a Day.  Dispense: 60 capsule; Refill: 5        Follow Up   Return in about 4 weeks (around 1/26/2024) for Recheck.  Patient was given instructions and counseling regarding  her condition or for health maintenance advice. Please see specific information pulled into the AVS if appropriate.     Julia Foote PA-C

## 2024-01-03 ENCOUNTER — LAB (OUTPATIENT)
Dept: FAMILY MEDICINE CLINIC | Facility: CLINIC | Age: 79
End: 2024-01-03
Payer: MEDICARE

## 2024-01-03 DIAGNOSIS — E87.6 HYPOKALEMIA: ICD-10-CM

## 2024-01-03 PROCEDURE — 36415 COLL VENOUS BLD VENIPUNCTURE: CPT | Performed by: PHYSICIAN ASSISTANT

## 2024-01-04 LAB
BUN SERPL-MCNC: 26 MG/DL (ref 8–27)
BUN/CREAT SERPL: 29 (ref 12–28)
CALCIUM SERPL-MCNC: 9.8 MG/DL (ref 8.7–10.3)
CHLORIDE SERPL-SCNC: 103 MMOL/L (ref 96–106)
CO2 SERPL-SCNC: 24 MMOL/L (ref 20–29)
CREAT SERPL-MCNC: 0.9 MG/DL (ref 0.57–1)
EGFRCR SERPLBLD CKD-EPI 2021: 65 ML/MIN/1.73
GLUCOSE SERPL-MCNC: 90 MG/DL (ref 70–99)
POTASSIUM SERPL-SCNC: 4.7 MMOL/L (ref 3.5–5.2)
SODIUM SERPL-SCNC: 143 MMOL/L (ref 134–144)

## 2024-01-18 RX ORDER — FLECAINIDE ACETATE 100 MG/1
100 TABLET ORAL 2 TIMES DAILY
Qty: 180 TABLET | Refills: 0 | Status: SHIPPED | OUTPATIENT
Start: 2024-01-18

## 2024-01-26 ENCOUNTER — OFFICE VISIT (OUTPATIENT)
Dept: FAMILY MEDICINE CLINIC | Facility: CLINIC | Age: 79
End: 2024-01-26
Payer: MEDICARE

## 2024-01-26 VITALS
HEIGHT: 62 IN | TEMPERATURE: 97.9 F | OXYGEN SATURATION: 97 % | HEART RATE: 68 BPM | SYSTOLIC BLOOD PRESSURE: 122 MMHG | BODY MASS INDEX: 26.28 KG/M2 | WEIGHT: 142.8 LBS | DIASTOLIC BLOOD PRESSURE: 80 MMHG

## 2024-01-26 DIAGNOSIS — G44.321 INTRACTABLE CHRONIC POST-TRAUMATIC HEADACHE: Primary | ICD-10-CM

## 2024-01-26 PROCEDURE — 1160F RVW MEDS BY RX/DR IN RCRD: CPT | Performed by: PHYSICIAN ASSISTANT

## 2024-01-26 PROCEDURE — 3079F DIAST BP 80-89 MM HG: CPT | Performed by: PHYSICIAN ASSISTANT

## 2024-01-26 PROCEDURE — 1159F MED LIST DOCD IN RCRD: CPT | Performed by: PHYSICIAN ASSISTANT

## 2024-01-26 PROCEDURE — 99213 OFFICE O/P EST LOW 20 MIN: CPT | Performed by: PHYSICIAN ASSISTANT

## 2024-01-26 PROCEDURE — 3074F SYST BP LT 130 MM HG: CPT | Performed by: PHYSICIAN ASSISTANT

## 2024-01-26 NOTE — ASSESSMENT & PLAN NOTE
"Headaches are unchanged, but she again declines any medication trials and insists she wants to see neurology, she wants to discuss the \"possible aneurysm\" with the neurologist, but right now her appt is not until August and she wants to see someone sooner, so she is requesting we try to refer her to a different neurologist.  I will have our referral person see if Dr. Gregory can see her sooner.        "

## 2024-01-26 NOTE — PROGRESS NOTES
"Answers submitted by the patient for this visit:  Other (Submitted on 1/24/2024)  Please describe your symptoms.: Headache  Have you had these symptoms before?: Yes  How long have you been having these symptoms?: Greater than 2 weeks  Primary Reason for Visit (Submitted on 1/24/2024)  What is the primary reason for your visit?: Other  Chief Complaint  Headache (X3 months)    Subjective          Nesha Bacon presents to [facility]    History of Present Illness  patient is here today for a follow up on her chronic headache related to previus trauma.  She says she has an apt with the neurologist however it's not until August and she thinks that is too long to wait for an appointment.  She is convinced someone told her in the hospital that something was wrong with her MRI or CT Scan of the brain, but she can't remember what they told her.  I reviewed these tests and see nothing to be concerned with, yet she has a daily persistent headache.  She does not take medication for the headaches unless they \"get bad\" then she takes tylenol and it seems to help.      Objective   Vital Signs:   /80 (BP Location: Right arm)   Pulse 68   Temp 97.9 °F (36.6 °C)   Ht 157.5 cm (62\")   Wt 64.8 kg (142 lb 12.8 oz)   SpO2 97%   BMI 26.12 kg/m²     Body mass index is 26.12 kg/m².    Review of Systems    Past History:  Medical History: has a past medical history of Acute cystitis without hematuria, Age-related osteoporosis without current pathological fracture, Back pain, BMI 20.0-20.9, adult, Bone injury, Chronic fatigue, Chronic obstructive pulmonary disease, Closed fracture of left hip, with routine healing, subsequent encounter, Lightheadedness, Paroxysmal atrial fibrillation, Postoperative anemia, Pure hypercholesterolemia, Renal cancer, and Shortness of breath.   Surgical History: has a past surgical history that includes Partial nephrectomy; Lumbar laminectomy; vaginal hysterectomy " salpingo oophorectomy with anterior and posterior vaginal repair; Cataract extraction, bilateral; Cholecystectomy; Tubal ligation; and Hip fracture surgery.   Family History: family history includes Coronary artery disease in her brother; Hypertension in her mother; Stroke in her mother.   Social History: reports that she has never smoked. She has never used smokeless tobacco. She reports that she does not drink alcohol and does not use drugs.      Current Outpatient Medications:     calcium carbonate (OS-ROLAND) 1250 (500 Ca) MG tablet, Take  by mouth Daily., Disp: , Rfl:     flecainide (TAMBOCOR) 100 MG tablet, Take 1 tablet by mouth twice daily, Disp: 180 tablet, Rfl: 0    fludrocortisone 0.1 MG tablet, Take 1 tablet by mouth Daily., Disp: 90 tablet, Rfl: 1    losartan (COZAAR) 100 MG tablet, Take 1 tablet by mouth Daily., Disp: 90 tablet, Rfl: 3    metoprolol succinate XL (TOPROL-XL) 25 MG 24 hr tablet, Take 1 tablet by mouth once daily, Disp: 90 tablet, Rfl: 2    potassium chloride (MICRO-K) 10 MEQ CR capsule, Take 1 capsule by mouth 2 (Two) Times a Day., Disp: 60 capsule, Rfl: 5    pramipexole (MIRAPEX) 0.25 MG tablet, Take 1 tablet by mouth Every Night., Disp: 90 tablet, Rfl: 1    rivaroxaban (XARELTO) 20 MG tablet, Take 1 tablet by mouth Daily., Disp: , Rfl:     rosuvastatin (CRESTOR) 5 MG tablet, Take 1 tablet by mouth Daily., Disp: 90 tablet, Rfl: 3    Allergies: Aspirin and Latex    Physical Exam  Constitutional:       Appearance: Normal appearance.   HENT:      Head: Normocephalic and atraumatic.      Nose: Nose normal.   Eyes:      Extraocular Movements: Extraocular movements intact.      Pupils: Pupils are equal, round, and reactive to light.   Cardiovascular:      Rate and Rhythm: Normal rate and regular rhythm.   Pulmonary:      Effort: Pulmonary effort is normal.      Breath sounds: Normal breath sounds.   Abdominal:      General: Bowel sounds are normal.      Palpations: Abdomen is soft.  "  Musculoskeletal:         General: Normal range of motion.      Cervical back: Normal range of motion.   Neurological:      General: No focal deficit present.      Mental Status: She is alert and oriented to person, place, and time.   Psychiatric:         Mood and Affect: Mood normal.          Result Review :                   Assessment and Plan    Diagnoses and all orders for this visit:    1. Intractable chronic post-traumatic headache (Primary)  Assessment & Plan:  Headaches are unchanged, but she again declines any medication trials and insists she wants to see neurology, she wants to discuss the \"possible aneurysm\" with the neurologist, but right now her appt is not until August and she wants to see someone sooner, so she is requesting we try to refer her to a different neurologist.  I will have our referral person see if Dr. Gregory can see her sooner.          Orders:  -     Ambulatory Referral to Neurology        Follow Up   Return if symptoms worsen or fail to improve, for keep appt as scheduled in Feb.  Patient was given instructions and counseling regarding her condition or for health maintenance advice. Please see specific information pulled into the AVS if appropriate.     Julia Foote PA-C  "

## 2024-02-15 RX ORDER — LOSARTAN POTASSIUM 50 MG/1
50 TABLET ORAL DAILY
Qty: 90 TABLET | Refills: 0 | Status: SHIPPED | OUTPATIENT
Start: 2024-02-15

## 2024-02-20 NOTE — TELEPHONE ENCOUNTER
Caller: Jordi Nesha Dorothy    Relationship: Self    Best call back number: [phone]    Requested Prescriptions:   Requested Prescriptions     Pending Prescriptions Disp Refills    rivaroxaban (XARELTO) 20 MG tablet 30 tablet      Sig: Take 1 tablet by mouth Daily.        Pharmacy where request should be sent: [facility] [address] [place] [address] [phone] [facility] [phone] FX     Last office visit with prescribing clinician: 11/28/2023   Last telemedicine visit with prescribing clinician: Visit date not found   Next office visit with prescribing clinician: 5/29/2024       Does the patient have less than a 3 day supply:  [x] Yes  [] No    Would you like a call back once the refill request has been completed: [] Yes [x] No    If the office needs to give you a call back, can they leave a voicemail: [] Yes [x] No    Wero Oro Rep   02/20/24 10:26 EST

## 2024-02-26 ENCOUNTER — OFFICE VISIT (OUTPATIENT)
Dept: FAMILY MEDICINE CLINIC | Facility: CLINIC | Age: 79
End: 2024-02-26
Payer: MEDICARE

## 2024-02-26 VITALS
WEIGHT: 147.7 LBS | HEIGHT: 62 IN | SYSTOLIC BLOOD PRESSURE: 130 MMHG | HEART RATE: 56 BPM | TEMPERATURE: 96.8 F | OXYGEN SATURATION: 99 % | BODY MASS INDEX: 27.18 KG/M2 | DIASTOLIC BLOOD PRESSURE: 70 MMHG

## 2024-02-26 DIAGNOSIS — E78.00 HYPERCHOLESTEROLEMIA: ICD-10-CM

## 2024-02-26 DIAGNOSIS — I10 ESSENTIAL HYPERTENSION: ICD-10-CM

## 2024-02-26 DIAGNOSIS — Z00.00 ENCOUNTER FOR SUBSEQUENT ANNUAL WELLNESS VISIT (AWV) IN MEDICARE PATIENT: Primary | ICD-10-CM

## 2024-02-26 DIAGNOSIS — G25.81 RESTLESS LEG SYNDROME: ICD-10-CM

## 2024-02-26 RX ORDER — PRAMIPEXOLE DIHYDROCHLORIDE 0.25 MG/1
0.25 TABLET ORAL NIGHTLY
Qty: 90 TABLET | Refills: 3 | Status: SHIPPED | OUTPATIENT
Start: 2024-02-26

## 2024-02-26 RX ORDER — PRAMIPEXOLE DIHYDROCHLORIDE 0.25 MG/1
0.25 TABLET ORAL NIGHTLY
Qty: 90 TABLET | Refills: 0 | OUTPATIENT
Start: 2024-02-26

## 2024-02-26 NOTE — ASSESSMENT & PLAN NOTE
Updated annual wellness visit checklist. Immunizations discussed. Screening up-to-date. Recommend yearly dental and eye exams. Also discussed monitoring of blood pressure and lipids. We addressed patient self-assessment of health status, frailty, and physical functioning. We reviewed psychosocial risks, behavioral risks, instrumental activities of daily living, and patient health risk assessment. Patient was given a personalized prevention plan.

## 2024-02-26 NOTE — PROGRESS NOTES
The ABCs of the Annual Wellness Visit  Subsequent Medicare Wellness Visit    Subjective    Nesha Bacon is a 79 y.o. female who presents for a Subsequent Medicare Wellness Visit.    The following portions of the patient's history were reviewed and   updated as appropriate: allergies, current medications, past family history, past medical history, past social history, past surgical history, and problem list.    Compared to one year ago, the patient feels her physical   health is the same.    Compared to one year ago, the patient feels her mental   health is the same.    Recent Hospitalizations:  She was  admitted to the [facility] in November of 2023 for 2-3 days during the last year.       Current Medical Providers:  Patient Care Team:  Julia Foote PA-C as PCP - General (Family Medicine)  Adiel Kang MD as Consulting Physician (Cardiology)    Outpatient Medications Prior to Visit   Medication Sig Dispense Refill    calcium carbonate (OS-ROLAND) 1250 (500 Ca) MG tablet Take  by mouth Daily.      flecainide (TAMBOCOR) 100 MG tablet Take 1 tablet by mouth twice daily 180 tablet 0    fludrocortisone 0.1 MG tablet Take 1 tablet by mouth Daily. 90 tablet 1    losartan (COZAAR) 100 MG tablet Take 1 tablet by mouth Daily. 90 tablet 3    losartan (COZAAR) 50 MG tablet Take 1 tablet by mouth once daily 90 tablet 0    metoprolol succinate XL (TOPROL-XL) 25 MG 24 hr tablet Take 1 tablet by mouth once daily 90 tablet 2    potassium chloride (MICRO-K) 10 MEQ CR capsule Take 1 capsule by mouth 2 (Two) Times a Day. 60 capsule 5    rivaroxaban (XARELTO) 20 MG tablet Take 1 tablet by mouth Daily. 90 tablet 1    rosuvastatin (CRESTOR) 5 MG tablet Take 1 tablet by mouth Daily. 90 tablet 3    pramipexole (MIRAPEX) 0.25 MG tablet Take 1 tablet by mouth Every Night. 90 tablet 1     No facility-administered medications prior to visit.       No opioid medication identified on active medication list. I have reviewed  "chart for other potential  high risk medication/s and harmful drug interactions in the elderly.        Aspirin is not on active medication list.  Aspirin use is not indicated based on review of current medical condition/s. Risk of harm outweighs potential benefits.  .    Patient Active Problem List   Diagnosis    Paroxysmal atrial fibrillation    Autonomic orthostatic hypotension    Simple chronic bronchitis    Vitamin B12 deficiency    Eczema    Encounter for routine adult medical examination    Restless leg syndrome    Age-related osteoporosis without current pathological fracture    Encounter for subsequent annual wellness visit (AWV) in Medicare patient    Osteoporosis with current pathological fracture    Hypokalemia    Essential hypertension    Hypercholesterolemia    Intractable chronic post-traumatic headache    COPD (chronic obstructive pulmonary disease)    History of back surgery    History of partial nephrectomy    Renal cell carcinoma     Advance Care Planning   Advance Care Planning     Advance Directive is not on file.  ACP discussion was held with the patient during this visit. Patient does not have an advance directive, declines further assistance.     Objective    Vitals:    02/26/24 1028   BP: 130/70   BP Location: Left arm   Patient Position: Sitting   Cuff Size: Adult   Pulse: 56   Temp: 96.8 °F (36 °C)   TempSrc: Temporal   SpO2: 99%   Weight: 67 kg (147 lb 11.2 oz)   Height: 157.5 cm (62\")   PainSc: 0-No pain     Estimated body mass index is 27.01 kg/m² as calculated from the following:    Height as of this encounter: 157.5 cm (62\").    Weight as of this encounter: 67 kg (147 lb 11.2 oz).    BMI is >= 25 and <30. (Overweight) The following options were offered after discussion;: exercise counseling/recommendations and nutrition counseling/recommendations      Does the patient have evidence of cognitive impairment? No          HEALTH RISK ASSESSMENT    Smoking Status:  Social History "     Tobacco Use   Smoking Status Never   Smokeless Tobacco Never     Alcohol Consumption:  Social History     Substance and Sexual Activity   Alcohol Use Never     Fall Risk Screen:    MARVINCARRIE Fall Risk Assessment was completed, and patient is at LOW risk for falls.Assessment completed on:2024    Depression Screenin/26/2024    10:31 AM   PHQ-2/PHQ-9 Depression Screening   Little Interest or Pleasure in Doing Things 0-->not at all   Feeling Down, Depressed or Hopeless 0-->not at all   PHQ-9: Brief Depression Severity Measure Score 0       Health Habits and Functional and Cognitive Screenin/26/2024    10:29 AM   Functional & Cognitive Status   Do you have difficulty preparing food and eating? No   Do you have difficulty bathing yourself, getting dressed or grooming yourself? No   Do you have difficulty using the toilet? No   Do you have difficulty moving around from place to place? No   Do you have trouble with steps or getting out of a bed or a chair? No   Current Diet Well Balanced Diet   Dental Exam Up to date   Eye Exam Up to date   Exercise (times per week) 2 times per week   Current Exercises Include Stationary Bicycling/Spin Class   Do you need help using the phone?  No   Are you deaf or do you have serious difficulty hearing?  No   Do you need help to go to places out of walking distance? Yes   Do you need help shopping? No   Do you need help preparing meals?  No   Do you need help with housework?  No   Do you need help with laundry? No   Do you need help taking your medications? No   Do you need help managing money? No   Do you ever drive or ride in a car without wearing a seat belt? No   Have you felt unusual stress, anger or loneliness in the last month? No   Who do you live with? Child   If you need help, do you have trouble finding someone available to you? No   Have you been bothered in the last four weeks by sexual problems? No   Do you have difficulty concentrating, remembering or  making decisions? No       Age-appropriate Screening Schedule:  Refer to the list below for future screening recommendations based on patient's age, sex and/or medical conditions. Orders for these recommended tests are listed in the plan section. The patient has been provided with a written plan.    Health Maintenance   Topic Date Due    LIPID PANEL  08/24/2023    COVID-19 Vaccine (2 - 2023-24 season) 02/28/2024 (Originally 9/1/2023)    INFLUENZA VACCINE  03/31/2024 (Originally 8/1/2023)    Pneumococcal Vaccine 65+ (1 of 2 - PCV) 05/01/2024 (Originally 1/12/1951)    TDAP/TD VACCINES (1 - Tdap) 12/30/2024 (Originally 1/12/1964)    HEPATITIS C SCREENING  02/26/2025 (Originally 4/21/2021)    RSV Vaccine - Adults (1 - 1-dose 60+ series) 02/26/2025 (Originally 1/12/2005)    ZOSTER VACCINE (1 of 2) 02/26/2025 (Originally 1/12/1995)    ANNUAL WELLNESS VISIT  02/26/2025    BMI FOLLOWUP  02/26/2025    DXA SCAN  11/13/2025                  CMS Preventative Services Quick Reference  Risk Factors Identified During Encounter  Immunizations Discussed/Encouraged: Tdap, Prevnar 20 (Pneumococcal 20-valent conjugate), Shingrix, COVID19, and RSV (Respiratory Syncytial Virus)  The above risks/problems have been discussed with the patient.  Pertinent information has been shared with the patient in the After Visit Summary.  An After Visit Summary and PPPS were made available to the patient.    Follow Up:   Next Medicare Wellness visit to be scheduled in 1 year.       Additional E&M Note during same encounter follows:  Patient has multiple medical problems which are significant and separately identifiable that require additional work above and beyond the Medicare Wellness Visit.      Chief Complaint  Medicare Wellness-subsequent, Restless Legs Syndrome (Needs medication refilled.), Hypertension, and Hyperlipidemia    Subjective        HPI  Nesha De La Cruz Jordi is also being seen today for  a refill of her restless leg syndrome.  She also  "stated  her headaches remain the same, but she has an appt with the neurologist in March.  She needs some blood work today as well for her BP and cholesterol meds.      Review of Systems   Constitutional: Negative.  Negative for chills and fever.   HENT:  Negative for congestion and postnasal drip.    Respiratory:  Negative for cough and wheezing.    Musculoskeletal:  Positive for myalgias.   Psychiatric/Behavioral:  Positive for sleep disturbance (due to restless legs).        Objective   Vital Signs:  /70 (BP Location: Left arm, Patient Position: Sitting, Cuff Size: Adult)   Pulse 56   Temp 96.8 °F (36 °C) (Temporal)   Ht 157.5 cm (62\")   Wt 67 kg (147 lb 11.2 oz)   SpO2 99%   BMI 27.01 kg/m²     Physical Exam  Constitutional:       Appearance: Normal appearance.   Neurological:      Mental Status: She is alert and oriented to person, place, and time.   Psychiatric:         Mood and Affect: Mood normal.         Behavior: Behavior normal.                         Assessment and Plan   Diagnoses and all orders for this visit:    1. Encounter for subsequent annual wellness visit (AWV) in Medicare patient (Primary)  Assessment & Plan:  Updated annual wellness visit checklist. Immunizations discussed. Screening up-to-date. Recommend yearly dental and eye exams. Also discussed monitoring of blood pressure and lipids. We addressed patient self-assessment of health status, frailty, and physical functioning. We reviewed psychosocial risks, behavioral risks, instrumental activities of daily living, and patient health risk assessment. Patient was given a personalized prevention plan.       2. Essential hypertension  Assessment & Plan:  Hypertension is stable and controlled  Continue current treatment regimen.  Blood pressure will be reassessed in 6 months.    Orders:  -     CBC & Differential; Future  -     Comprehensive Metabolic Panel; Future  -     CBC & Differential  -     Comprehensive Metabolic Panel    3. " Hypercholesterolemia  Assessment & Plan:   Lipid abnormalities are stable    Plan:  Continue same medication/s without change.      Discussed medication dosage, use, side effects, and goals of treatment in detail.    Counseled patient on lifestyle modifications to help control hyperlipidemia.     Patient Treatment Goals:   LDL goal is less than 70  LDL goal is under 130    Followup in 6 months.    Orders:  -     Lipid Panel; Future  -     TSH; Future  -     Lipid Panel  -     TSH    4. Restless leg syndrome  Assessment & Plan:  Continue present care no changes meds refilled.      Other orders  -     Cancel: Pneumococcal Conjugate Vaccine 20-Valent All  -     pramipexole (MIRAPEX) 0.25 MG tablet; Take 1 tablet by mouth Every Night.  Dispense: 90 tablet; Refill: 3             Follow Up   Return in about 1 year (around 2/26/2025) for Medicare Wellness, Annual physical.  Patient was given instructions and counseling regarding her condition or for health maintenance advice. Please see specific information pulled into the AVS if appropriate.

## 2024-02-26 NOTE — ASSESSMENT & PLAN NOTE
Lipid abnormalities are stable    Plan:  Continue same medication/s without change.      Discussed medication dosage, use, side effects, and goals of treatment in detail.    Counseled patient on lifestyle modifications to help control hyperlipidemia.     Patient Treatment Goals:   LDL goal is less than 70  LDL goal is under 130    Followup in 6 months.

## 2024-02-27 LAB
ALBUMIN SERPL-MCNC: 4.2 G/DL (ref 3.8–4.8)
ALBUMIN/GLOB SERPL: 1.5 {RATIO} (ref 1.2–2.2)
ALP SERPL-CCNC: 68 IU/L (ref 44–121)
ALT SERPL-CCNC: 17 IU/L (ref 0–32)
AST SERPL-CCNC: 26 IU/L (ref 0–40)
BASOPHILS # BLD AUTO: 0 X10E3/UL (ref 0–0.2)
BASOPHILS NFR BLD AUTO: 1 %
BILIRUB SERPL-MCNC: 0.8 MG/DL (ref 0–1.2)
BUN SERPL-MCNC: 22 MG/DL (ref 8–27)
BUN/CREAT SERPL: 30 (ref 12–28)
CALCIUM SERPL-MCNC: 9.8 MG/DL (ref 8.7–10.3)
CHLORIDE SERPL-SCNC: 109 MMOL/L (ref 96–106)
CHOLEST SERPL-MCNC: 139 MG/DL (ref 100–199)
CO2 SERPL-SCNC: 25 MMOL/L (ref 20–29)
CREAT SERPL-MCNC: 0.74 MG/DL (ref 0.57–1)
EGFRCR SERPLBLD CKD-EPI 2021: 82 ML/MIN/1.73
EOSINOPHIL # BLD AUTO: 0 X10E3/UL (ref 0–0.4)
EOSINOPHIL NFR BLD AUTO: 0 %
ERYTHROCYTE [DISTWIDTH] IN BLOOD BY AUTOMATED COUNT: 12.6 % (ref 11.7–15.4)
GLOBULIN SER CALC-MCNC: 2.8 G/DL (ref 1.5–4.5)
GLUCOSE SERPL-MCNC: 74 MG/DL (ref 70–99)
HCT VFR BLD AUTO: 39 % (ref 34–46.6)
HDLC SERPL-MCNC: 59 MG/DL
HGB BLD-MCNC: 12.7 G/DL (ref 11.1–15.9)
IMM GRANULOCYTES # BLD AUTO: 0 X10E3/UL (ref 0–0.1)
IMM GRANULOCYTES NFR BLD AUTO: 0 %
LDLC SERPL CALC-MCNC: 59 MG/DL (ref 0–99)
LYMPHOCYTES # BLD AUTO: 1.5 X10E3/UL (ref 0.7–3.1)
LYMPHOCYTES NFR BLD AUTO: 23 %
MCH RBC QN AUTO: 32 PG (ref 26.6–33)
MCHC RBC AUTO-ENTMCNC: 32.6 G/DL (ref 31.5–35.7)
MCV RBC AUTO: 98 FL (ref 79–97)
MONOCYTES # BLD AUTO: 0.4 X10E3/UL (ref 0.1–0.9)
MONOCYTES NFR BLD AUTO: 6 %
NEUTROPHILS # BLD AUTO: 4.6 X10E3/UL (ref 1.4–7)
NEUTROPHILS NFR BLD AUTO: 70 %
PLATELET # BLD AUTO: 328 X10E3/UL (ref 150–450)
POTASSIUM SERPL-SCNC: 4.1 MMOL/L (ref 3.5–5.2)
PROT SERPL-MCNC: 7 G/DL (ref 6–8.5)
RBC # BLD AUTO: 3.97 X10E6/UL (ref 3.77–5.28)
SODIUM SERPL-SCNC: 147 MMOL/L (ref 134–144)
TRIGL SERPL-MCNC: 116 MG/DL (ref 0–149)
TSH SERPL DL<=0.005 MIU/L-ACNC: 1.48 UIU/ML (ref 0.45–4.5)
VLDLC SERPL CALC-MCNC: 21 MG/DL (ref 5–40)
WBC # BLD AUTO: 6.6 X10E3/UL (ref 3.4–10.8)

## 2024-04-16 RX ORDER — FLECAINIDE ACETATE 50 MG/1
50 TABLET ORAL 2 TIMES DAILY
Qty: 60 TABLET | Refills: 5 | Status: SHIPPED | OUTPATIENT
Start: 2024-04-16

## 2024-05-10 ENCOUNTER — TELEPHONE (OUTPATIENT)
Dept: CARDIOLOGY | Facility: CLINIC | Age: 79
End: 2024-05-10
Payer: MEDICARE

## 2024-05-10 RX ORDER — METOPROLOL SUCCINATE 25 MG/1
25 TABLET, EXTENDED RELEASE ORAL DAILY
Qty: 90 TABLET | Refills: 1 | Status: SHIPPED | OUTPATIENT
Start: 2024-05-10

## 2024-05-14 RX ORDER — LOSARTAN POTASSIUM 50 MG/1
50 TABLET ORAL DAILY
Qty: 90 TABLET | Refills: 0 | Status: SHIPPED | OUTPATIENT
Start: 2024-05-14

## 2024-05-17 ENCOUNTER — OFFICE VISIT (OUTPATIENT)
Dept: FAMILY MEDICINE CLINIC | Facility: CLINIC | Age: 79
End: 2024-05-17
Payer: MEDICARE

## 2024-05-17 VITALS
HEIGHT: 62 IN | WEIGHT: 141 LBS | DIASTOLIC BLOOD PRESSURE: 84 MMHG | OXYGEN SATURATION: 96 % | SYSTOLIC BLOOD PRESSURE: 124 MMHG | BODY MASS INDEX: 25.95 KG/M2 | HEART RATE: 64 BPM | RESPIRATION RATE: 18 BRPM | TEMPERATURE: 97.1 F

## 2024-05-17 DIAGNOSIS — G44.321 INTRACTABLE CHRONIC POST-TRAUMATIC HEADACHE: ICD-10-CM

## 2024-05-17 DIAGNOSIS — M62.838 NECK MUSCLE SPASM: Primary | ICD-10-CM

## 2024-05-17 PROCEDURE — 1159F MED LIST DOCD IN RCRD: CPT | Performed by: PHYSICIAN ASSISTANT

## 2024-05-17 PROCEDURE — 1125F AMNT PAIN NOTED PAIN PRSNT: CPT | Performed by: PHYSICIAN ASSISTANT

## 2024-05-17 PROCEDURE — 96372 THER/PROPH/DIAG INJ SC/IM: CPT | Performed by: PHYSICIAN ASSISTANT

## 2024-05-17 PROCEDURE — 3079F DIAST BP 80-89 MM HG: CPT | Performed by: PHYSICIAN ASSISTANT

## 2024-05-17 PROCEDURE — 99213 OFFICE O/P EST LOW 20 MIN: CPT | Performed by: PHYSICIAN ASSISTANT

## 2024-05-17 PROCEDURE — 3074F SYST BP LT 130 MM HG: CPT | Performed by: PHYSICIAN ASSISTANT

## 2024-05-17 PROCEDURE — 1160F RVW MEDS BY RX/DR IN RCRD: CPT | Performed by: PHYSICIAN ASSISTANT

## 2024-05-17 RX ORDER — SULFAMETHOXAZOLE AND TRIMETHOPRIM 800; 160 MG/1; MG/1
1 TABLET ORAL EVERY 12 HOURS SCHEDULED
COMMUNITY
Start: 2024-05-11

## 2024-05-17 RX ORDER — AMITRIPTYLINE HYDROCHLORIDE 10 MG/1
TABLET, FILM COATED ORAL
COMMUNITY
Start: 2024-03-18

## 2024-05-17 RX ORDER — BUTALBITAL, ACETAMINOPHEN AND CAFFEINE 50; 325; 40 MG/1; MG/1; MG/1
TABLET ORAL
COMMUNITY
Start: 2024-03-18

## 2024-05-17 RX ORDER — TRIAMCINOLONE ACETONIDE 40 MG/ML
80 INJECTION, SUSPENSION INTRA-ARTICULAR; INTRAMUSCULAR ONCE
Status: COMPLETED | OUTPATIENT
Start: 2024-05-17 | End: 2024-05-17

## 2024-05-17 RX ADMIN — TRIAMCINOLONE ACETONIDE 80 MG: 40 INJECTION, SUSPENSION INTRA-ARTICULAR; INTRAMUSCULAR at 10:40

## 2024-05-17 NOTE — ASSESSMENT & PLAN NOTE
I told her that she really needed to take her medication as prescribed the amitriptyline should be taken at bedtime and that the Fioricet was not a narcotic it did have a medicine in it that would relax her but I think that is what she needs in order to stop this muscle spasm.  She expressed understanding and will try this.

## 2024-05-17 NOTE — PROGRESS NOTES
"Chief Complaint  Headache (L facial pain from top of head down to shoulder x1 day) and Neck Pain (Stiffness, pain x1 day)    Subjective          Nesha Bacon presents to [facility]    Headache  Neck Pain   Associated symptoms include headaches. Pertinent negatives include no chest pain or fever.    patient is experiencing headache and left sided neck pain with stiffness it has been going on since yesterday.  She said that she's had her spine in her neck and it's fine.  Dr Gregory evaluated her for these headaches and gave her Fioricet, but she hasn't taken it because she thinks it's a narcotic.  She also didn't take the amitriptyline because it made her too sleeping but she was taking it in the morning and not at night as prescribed.       Objective   Vital Signs:   /84 (BP Location: Right arm, Patient Position: Sitting, Cuff Size: Adult)   Pulse 64   Temp 97.1 °F (36.2 °C) (Oral)   Resp 18   Ht 157.5 cm (62\")   Wt 64 kg (141 lb)   SpO2 96%   BMI 25.79 kg/m²     Body mass index is 25.79 kg/m².    Review of Systems   Constitutional:  Negative for chills, fatigue and fever.   Respiratory:  Negative for cough and shortness of breath.    Cardiovascular:  Negative for chest pain and palpitations.   Musculoskeletal:  Positive for neck pain. Negative for back pain and myalgias.   Neurological:  Positive for headache. Negative for dizziness.   Psychiatric/Behavioral:  Negative for depressed mood. The patient is not nervous/anxious.        Past History:  Medical History: has a past medical history of Acute cystitis without hematuria, Age-related osteoporosis without current pathological fracture, Back pain, BMI 20.0-20.9, adult, Bone injury, Chronic fatigue, Chronic obstructive pulmonary disease, Closed fracture of left hip, with routine healing, subsequent encounter, Lightheadedness, Paroxysmal atrial fibrillation, Postoperative anemia, Pure hypercholesterolemia, Renal cancer, " and Shortness of breath.   Surgical History: has a past surgical history that includes Partial nephrectomy; Lumbar laminectomy; vaginal hysterectomy salpingo oophorectomy with anterior and posterior vaginal repair; Cataract extraction, bilateral; Cholecystectomy; Tubal ligation; and Hip fracture surgery.   Family History: family history includes Coronary artery disease in her brother; Hypertension in her mother; Stroke in her mother.   Social History: reports that she has never smoked. She has never used smokeless tobacco. She reports that she does not drink alcohol and does not use drugs.      Current Outpatient Medications:     calcium carbonate (OS-ROLAND) 1250 (500 Ca) MG tablet, Take  by mouth Daily., Disp: , Rfl:     flecainide (TAMBOCOR) 50 MG tablet, Take 1 tablet by mouth 2 (Two) Times a Day., Disp: 60 tablet, Rfl: 5    fludrocortisone 0.1 MG tablet, Take 1 tablet by mouth Daily., Disp: 90 tablet, Rfl: 1    losartan (COZAAR) 50 MG tablet, Take 1 tablet by mouth once daily, Disp: 90 tablet, Rfl: 0    metoprolol succinate XL (TOPROL-XL) 25 MG 24 hr tablet, Take 1 tablet by mouth Daily., Disp: 90 tablet, Rfl: 1    potassium chloride (MICRO-K) 10 MEQ CR capsule, Take 1 capsule by mouth 2 (Two) Times a Day., Disp: 60 capsule, Rfl: 5    rivaroxaban (XARELTO) 20 MG tablet, Take 1 tablet by mouth Daily., Disp: 90 tablet, Rfl: 1    rosuvastatin (CRESTOR) 5 MG tablet, Take 1 tablet by mouth Daily., Disp: 90 tablet, Rfl: 3    sulfamethoxazole-trimethoprim (BACTRIM DS,SEPTRA DS) 800-160 MG per tablet, Take 1 tablet by mouth Every 12 (Twelve) Hours., Disp: , Rfl:     amitriptyline (ELAVIL) 10 MG tablet, TAKE 1 TABLET BY MOUTH AT BEDTIME TO PREVENT HEADACHE (Patient not taking: Reported on 5/17/2024), Disp: , Rfl:     butalbital-acetaminophen-caffeine (FIORICET, ESGIC) -40 MG per tablet, TAKE 1 TO 2 TABLETS BY MOUTH ONCE DAILY AS NEEDED FOR HEADACHE (Patient not taking: Reported on 5/17/2024), Disp: , Rfl:      pramipexole (MIRAPEX) 0.25 MG tablet, Take 1 tablet by mouth Every Night., Disp: 90 tablet, Rfl: 3  No current facility-administered medications for this visit.    Allergies: Aspirin and Latex    Physical Exam  Constitutional:       Appearance: Normal appearance.   Musculoskeletal:      Cervical back: Spasms and tenderness present. No swelling. Pain with movement present. Normal range of motion.      Thoracic back: Spasms and tenderness present.   Neurological:      General: No focal deficit present.      Mental Status: She is alert and oriented to person, place, and time.      Cranial Nerves: Cranial nerves 2-12 are intact.      Sensory: Sensation is intact.      Motor: Motor function is intact.      Coordination: Coordination is intact.      Gait: Gait is intact.   Psychiatric:         Mood and Affect: Mood normal.         Behavior: Behavior normal.          Result Review :                   Assessment and Plan    Diagnoses and all orders for this visit:    1. Neck muscle spasm (Primary)  Assessment & Plan:  I told her that she really needed to take her medication as prescribed the amitriptyline should be taken at bedtime and that the Fioricet was not a narcotic it did have a medicine in it that would relax her but I think that is what she needs in order to stop this muscle spasm.  She expressed understanding and will try this.    Orders:  -     triamcinolone acetonide (KENALOG-40) injection 80 mg    2. Intractable chronic post-traumatic headache  Assessment & Plan:  See plan above.                   Follow Up   Return if symptoms worsen or fail to improve.  Patient was given instructions and counseling regarding her condition or for health maintenance advice. Please see specific information pulled into the AVS if appropriate.     Julia Foote PA-C

## 2024-05-29 ENCOUNTER — OFFICE VISIT (OUTPATIENT)
Dept: CARDIOLOGY | Facility: CLINIC | Age: 79
End: 2024-05-29
Payer: MEDICARE

## 2024-05-29 VITALS
BODY MASS INDEX: 25.58 KG/M2 | DIASTOLIC BLOOD PRESSURE: 60 MMHG | HEART RATE: 80 BPM | WEIGHT: 139 LBS | RESPIRATION RATE: 16 BRPM | SYSTOLIC BLOOD PRESSURE: 122 MMHG | HEIGHT: 62 IN | OXYGEN SATURATION: 98 %

## 2024-05-29 DIAGNOSIS — I95.1 AUTONOMIC ORTHOSTATIC HYPOTENSION: ICD-10-CM

## 2024-05-29 DIAGNOSIS — E78.00 HYPERCHOLESTEROLEMIA: ICD-10-CM

## 2024-05-29 DIAGNOSIS — Z98.890 HISTORY OF BACK SURGERY: Primary | ICD-10-CM

## 2024-05-29 DIAGNOSIS — I48.0 PAROXYSMAL ATRIAL FIBRILLATION: ICD-10-CM

## 2024-05-29 NOTE — PROGRESS NOTES
[facility] [place]  [facility] C[facility]IOLOG[facility]  [address] [place] [address]  Dept: [phone]  Dept Fax: [phone]    Nesha Bacon  1945    Follow Up Office Visit Note    History of Present Illness:  Nesha Bacon is a 79 y.o. female who presents to the clinic for Follow-up.  Orthostatic hypotension- She has been feeling again dizzy standing and also moving around, no falls,no syncope, BP is 120.60 lying and standing, however her complaints suggest OH, will d.c Toprol, will see if this helps, EKG sinus HR 57 on Flecainide 50 mg bi  The following portions of the patient's history were reviewed and updated as appropriate: allergies, current medications, past family history, past medical history, past social history, past surgical history, and problem list.    Medications:  calcium carbonate  flecainide  fludrocortisone  losartan  metoprolol succinate XL  potassium chloride  pramipexole  rivaroxaban  rosuvastatin    Subjective  Allergies   Allergen Reactions   • Aspirin Palpitations     Chest tightness   • Latex Rash        Past Medical History:   Diagnosis Date   • Acute cystitis without hematuria    • Age-related osteoporosis without current pathological fracture    • Back pain    • BMI 20.0-20.9, adult    • Bone injury     BONE OR JOINT INJURIES   • Chronic fatigue    • Chronic obstructive pulmonary disease    • Closed fracture of left hip, with routine healing, subsequent encounter    • Lightheadedness     CHRONIC   • Paroxysmal atrial fibrillation    • Postoperative anemia    • Pure hypercholesterolemia    • Renal cancer    • Shortness of breath        Past Surgical History:   Procedure Laterality Date   • CATARACT EXTRACTION, BILATERAL     • CHOLECYSTECTOMY     • HIP FRACTURE SURGERY     • LUMBAR LAMINECTOMY     • NEPHRECTOMY PARTIAL     • TUBAL ABDOMINAL LIGATION     • VAGINAL HYSTERECTOMY SALPINGO OOPHORECTOMY WITH ANTERIOR AND POSTERIOR VAGINAL REPAIR    "      Family History   Problem Relation Age of Onset   • Stroke Mother    • Hypertension Mother    • Coronary artery disease Brother         Social History     Socioeconomic History   • Marital status: Single   Tobacco Use   • Smoking status: Never   • Smokeless tobacco: Never   Vaping Use   • Vaping status: Never Used   Substance and Sexual Activity   • Alcohol use: Never   • Drug use: Never   • Sexual activity: Not Currently       Review of Systems   Constitutional: Negative.    HENT: Negative.     Respiratory: Negative.     Cardiovascular: Negative.    Endocrine: Negative.    Genitourinary: Negative.    Musculoskeletal: Negative.    Skin: Negative.    Allergic/Immunologic: Negative.    Neurological: Negative.    Hematological: Negative.    Psychiatric/Behavioral: Negative.       Cardiovascular Procedures    ECHO/MUGA:  STRESS TESTS:   CARDIAC CATH:   DEVICES:   HOLTER:   CT/MRI:   VASCULAR:   CARDIOTHORACIC:     Objective  Vitals:    05/29/24 1256   BP: 122/60   BP Location: Right arm   Patient Position: Lying   Cuff Size: Adult   Pulse: 80   Resp: 16   SpO2: 98%   Weight: 63 kg (139 lb)   Height: 157.5 cm (62\")   PainSc: 0-No pain     Body mass index is 25.42 kg/m².     Physical Exam  Vitals reviewed.   Constitutional:       Appearance: Healthy appearance. Not in distress.   Neck:      Vascular: No JVR. JVD normal.   Pulmonary:      Effort: Pulmonary effort is normal.      Breath sounds: Normal breath sounds. No wheezing. No rhonchi. No rales.   Chest:      Chest wall: Not tender to palpatation.   Cardiovascular:      PMI at left midclavicular line. Normal rate. Regular rhythm. Normal S1. Normal S2.       Murmurs: There is no murmur.      No gallop.  No click. No rub.   Pulses:     Intact distal pulses.   Edema:     Peripheral edema absent.   Abdominal:      General: Bowel sounds are normal.      Palpations: Abdomen is soft.      Tenderness: There is no abdominal tenderness.   Musculoskeletal: Normal range of " motion.         General: No tenderness. Skin:     General: Skin is warm and dry.   Neurological:      General: No focal deficit present.      Mental Status: Alert and oriented to person, place and time.        Diagnostic Data    ECG 12 Lead    Date/Time: 5/29/2024 1:21 PM  Performed by: Adiel Kang MD    Authorized by: Adiel Kang MD  Comparison: compared with previous ECG from 11/28/2023  Similar to previous ECG  Rhythm: sinus rhythm  Rate: normal  BPM: 57  QRS axis: normal    Clinical impression: normal ECG        Assessment and Plan  Diagnoses and all orders for this visit:      Hypercholesterolemia- on Crestor 5 mg, tolerates well    Autonomic orthostatic hypotension- will keep Fludrocortisone 0,1 daily, has some dizziness standing for quite some time, will d,c Toprol if not better we might increase the Fludrocortisone to 0.,2    Paroxysmal atrial fibrillation- No palpitations, EKG sinus HR 57 on Flecainide 50 mg bid and Xarelto 20 mg plus Toprol xl 25mg         No follow-ups on file.    Adiel Kang MD  05/29/2024

## 2024-06-14 RX ORDER — POTASSIUM CHLORIDE 750 MG/1
10 CAPSULE, EXTENDED RELEASE ORAL 2 TIMES DAILY
Qty: 60 CAPSULE | Refills: 0 | Status: SHIPPED | OUTPATIENT
Start: 2024-06-14

## 2024-07-01 RX ORDER — FLUDROCORTISONE ACETATE 0.1 MG/1
0.1 TABLET ORAL DAILY
Qty: 90 TABLET | Refills: 1 | Status: SHIPPED | OUTPATIENT
Start: 2024-07-01

## 2024-07-22 RX ORDER — POTASSIUM CHLORIDE 750 MG/1
10 CAPSULE, EXTENDED RELEASE ORAL 2 TIMES DAILY
Qty: 60 CAPSULE | Refills: 0 | Status: SHIPPED | OUTPATIENT
Start: 2024-07-22

## 2024-08-07 ENCOUNTER — OFFICE VISIT (OUTPATIENT)
Dept: FAMILY MEDICINE CLINIC | Facility: CLINIC | Age: 79
End: 2024-08-07
Payer: MEDICARE

## 2024-08-07 VITALS
HEART RATE: 59 BPM | OXYGEN SATURATION: 97 % | DIASTOLIC BLOOD PRESSURE: 88 MMHG | HEIGHT: 64 IN | SYSTOLIC BLOOD PRESSURE: 142 MMHG | BODY MASS INDEX: 23.87 KG/M2 | WEIGHT: 139.8 LBS

## 2024-08-07 DIAGNOSIS — F41.1 GENERALIZED ANXIETY DISORDER: ICD-10-CM

## 2024-08-07 DIAGNOSIS — N81.10 FEMALE BLADDER PROLAPSE: Primary | ICD-10-CM

## 2024-08-07 PROCEDURE — G2211 COMPLEX E/M VISIT ADD ON: HCPCS | Performed by: PHYSICIAN ASSISTANT

## 2024-08-07 PROCEDURE — 1160F RVW MEDS BY RX/DR IN RCRD: CPT | Performed by: PHYSICIAN ASSISTANT

## 2024-08-07 PROCEDURE — 3077F SYST BP >= 140 MM HG: CPT | Performed by: PHYSICIAN ASSISTANT

## 2024-08-07 PROCEDURE — 1126F AMNT PAIN NOTED NONE PRSNT: CPT | Performed by: PHYSICIAN ASSISTANT

## 2024-08-07 PROCEDURE — 99214 OFFICE O/P EST MOD 30 MIN: CPT | Performed by: PHYSICIAN ASSISTANT

## 2024-08-07 PROCEDURE — 1159F MED LIST DOCD IN RCRD: CPT | Performed by: PHYSICIAN ASSISTANT

## 2024-08-07 PROCEDURE — 3079F DIAST BP 80-89 MM HG: CPT | Performed by: PHYSICIAN ASSISTANT

## 2024-08-07 NOTE — ASSESSMENT & PLAN NOTE
Patient is provided with the requested letter for support animal for her chronic anxiety I do believe this is reasonable and would probably help her feel better.

## 2024-08-07 NOTE — ASSESSMENT & PLAN NOTE
I explained the patient that she would need a referral to either gynecology or urology and she prefers to see a gynecologist.

## 2024-08-07 NOTE — PROGRESS NOTES
"Chief Complaint  Anxiety (X2 months)    Subjective          Nesha Bacon presents to [facility]    Anxiety   Patient reports no chest pain, depressed mood, dizziness, nervous/anxious behavior, palpitations or shortness of breath.    Ms. Bacon is here today to request a letter for support animal.  She is experiencing a lot of stress in the family right now with recent deaths and she admits that she often feels lonely and she would like to have a pet cat to help with some of her anxiety feelings.  She has suffered from anxiety for years and has stayed away from medications but she has come to the realization that perhaps some companionship in her home would be helpful.  She also mentions that at times she feels like her bladder is going to fall out and she is wondering what she should do about this she has had it tacked once in the past.    Objective   Vital Signs:   /88 (BP Location: Right arm, Patient Position: Lying, Cuff Size: Adult)   Pulse 59   Ht 162.6 cm (64\")   Wt 63.4 kg (139 lb 12.8 oz)   SpO2 97%   BMI 24.00 kg/m²     Body mass index is 24 kg/m².    Review of Systems   Constitutional:  Negative for chills, fatigue and fever.   Respiratory:  Negative for cough and shortness of breath.    Cardiovascular:  Negative for chest pain and palpitations.   Musculoskeletal:  Negative for back pain and myalgias.   Neurological:  Negative for dizziness and headache.   Psychiatric/Behavioral:  Negative for depressed mood. The patient is not nervous/anxious.        Past History:  Medical History: has a past medical history of Acute cystitis without hematuria, Age-related osteoporosis without current pathological fracture, Back pain, BMI 20.0-20.9, adult, Bone injury, Chronic fatigue, Chronic obstructive pulmonary disease, Closed fracture of left hip, with routine healing, subsequent encounter, Lightheadedness, Paroxysmal atrial fibrillation, Postoperative anemia, Pure " hypercholesterolemia, Renal cancer, and Shortness of breath.   Surgical History: has a past surgical history that includes Partial nephrectomy; Lumbar laminectomy; vaginal hysterectomy salpingo oophorectomy with anterior and posterior vaginal repair; Cataract extraction, bilateral; Cholecystectomy; Tubal ligation; and Hip fracture surgery.   Family History: family history includes Coronary artery disease in her brother; Hypertension in her mother; Stroke in her mother.   Social History: reports that she has never smoked. She has never used smokeless tobacco. She reports that she does not drink alcohol and does not use drugs.      Current Outpatient Medications:     calcium carbonate (OS-ROLAND) 1250 (500 Ca) MG tablet, Take  by mouth Daily., Disp: , Rfl:     flecainide (TAMBOCOR) 50 MG tablet, Take 1 tablet by mouth 2 (Two) Times a Day., Disp: 60 tablet, Rfl: 5    fludrocortisone 0.1 MG tablet, Take 1 tablet by mouth Daily., Disp: 90 tablet, Rfl: 1    losartan (COZAAR) 50 MG tablet, Take 1 tablet by mouth once daily, Disp: 90 tablet, Rfl: 0    potassium chloride (MICRO-K) 10 MEQ CR capsule, Take 1 capsule by mouth twice daily, Disp: 60 capsule, Rfl: 0    pramipexole (MIRAPEX) 0.25 MG tablet, Take 1 tablet by mouth Every Night., Disp: 90 tablet, Rfl: 3    rivaroxaban (XARELTO) 20 MG tablet, Take 1 tablet by mouth Daily., Disp: 90 tablet, Rfl: 1    rosuvastatin (CRESTOR) 5 MG tablet, Take 1 tablet by mouth Daily., Disp: 90 tablet, Rfl: 3    Allergies: Aspirin and Latex    Physical Exam  Constitutional:       Appearance: Normal appearance.   HENT:      Head: Normocephalic.   Cardiovascular:      Rate and Rhythm: Normal rate and regular rhythm.   Pulmonary:      Effort: Pulmonary effort is normal.      Breath sounds: Normal breath sounds.   Neurological:      General: No focal deficit present.      Mental Status: She is alert and oriented to person, place, and time.   Psychiatric:         Mood and Affect: Mood normal.           Result Review :                   Assessment and Plan    Diagnoses and all orders for this visit:    1. Female bladder prolapse (Primary)  Assessment & Plan:  I explained the patient that she would need a referral to either gynecology or urology and she prefers to see a gynecologist.    Orders:  -     Ambulatory Referral to Gynecology    2. Generalized anxiety disorder  Assessment & Plan:  Patient is provided with the requested letter for support animal for her chronic anxiety I do believe this is reasonable and would probably help her feel better.          Follow Up   Return keep appt in Feb as scheduled..  Patient was given instructions and counseling regarding her condition or for health maintenance advice. Please see specific information pulled into the AVS if appropriate.     Julia Foote PA-C

## 2024-08-12 RX ORDER — LOSARTAN POTASSIUM 50 MG/1
50 TABLET ORAL DAILY
Qty: 90 TABLET | Refills: 0 | Status: SHIPPED | OUTPATIENT
Start: 2024-08-12

## 2024-08-19 RX ORDER — POTASSIUM CHLORIDE 750 MG/1
10 CAPSULE, EXTENDED RELEASE ORAL 2 TIMES DAILY
Qty: 60 CAPSULE | Refills: 0 | Status: SHIPPED | OUTPATIENT
Start: 2024-08-19

## 2024-09-13 RX ORDER — POTASSIUM CHLORIDE 750 MG/1
10 CAPSULE, EXTENDED RELEASE ORAL 2 TIMES DAILY
Qty: 60 CAPSULE | Refills: 0 | Status: SHIPPED | OUTPATIENT
Start: 2024-09-13

## 2024-09-16 RX ORDER — LOSARTAN POTASSIUM 50 MG/1
50 TABLET ORAL DAILY
Qty: 90 TABLET | Refills: 0 | Status: SHIPPED | OUTPATIENT
Start: 2024-09-16

## 2024-09-16 RX ORDER — ROSUVASTATIN CALCIUM 5 MG/1
5 TABLET, COATED ORAL DAILY
Qty: 90 TABLET | Refills: 3 | Status: SHIPPED | OUTPATIENT
Start: 2024-09-16

## 2024-09-20 ENCOUNTER — TELEPHONE (OUTPATIENT)
Dept: CARDIOLOGY | Facility: CLINIC | Age: 79
End: 2024-09-20
Payer: MEDICARE

## 2024-10-11 RX ORDER — FLECAINIDE ACETATE 50 MG/1
50 TABLET ORAL 2 TIMES DAILY
Qty: 180 TABLET | Refills: 0 | Status: SHIPPED | OUTPATIENT
Start: 2024-10-11

## 2024-10-14 ENCOUNTER — TELEPHONE (OUTPATIENT)
Dept: CARDIOLOGY | Facility: CLINIC | Age: 79
End: 2024-10-14
Payer: MEDICARE

## 2024-10-14 NOTE — TELEPHONE ENCOUNTER
Caller: Nesha Bacon    Relationship to patient: Self    Best call back number: [phone]    Chief complaint: HOSPITAL    Type of visit: HOSPITAL-FOLLOW UP    Requested date: ASAP      Additional notes:PATIENT WAS IN [facility] ER THEY TOLD HER TO FOLLOW-UP WITH DR. MARR, NO INFORMATION IN CHART, NOR DID THEY GIVE PATIENT FOLLOW- UP INSTRUCTIONS OF TIME FRAME. PLEASE ADVISE.

## 2024-10-16 ENCOUNTER — TELEPHONE (OUTPATIENT)
Dept: CARDIOLOGY | Facility: CLINIC | Age: 79
End: 2024-10-16

## 2024-10-16 ENCOUNTER — OFFICE VISIT (OUTPATIENT)
Dept: CARDIOLOGY | Facility: CLINIC | Age: 79
End: 2024-10-16
Payer: MEDICARE

## 2024-10-16 VITALS
BODY MASS INDEX: 23.73 KG/M2 | OXYGEN SATURATION: 99 % | DIASTOLIC BLOOD PRESSURE: 70 MMHG | HEART RATE: 75 BPM | WEIGHT: 139 LBS | RESPIRATION RATE: 18 BRPM | HEIGHT: 64 IN | TEMPERATURE: 97 F | SYSTOLIC BLOOD PRESSURE: 120 MMHG

## 2024-10-16 DIAGNOSIS — E78.00 HYPERCHOLESTEROLEMIA: ICD-10-CM

## 2024-10-16 DIAGNOSIS — I10 ESSENTIAL HYPERTENSION: ICD-10-CM

## 2024-10-16 DIAGNOSIS — R00.1 BRADYCARDIA, SINUS: ICD-10-CM

## 2024-10-16 DIAGNOSIS — I48.0 PAROXYSMAL ATRIAL FIBRILLATION: ICD-10-CM

## 2024-10-16 DIAGNOSIS — I95.1 AUTONOMIC ORTHOSTATIC HYPOTENSION: Primary | ICD-10-CM

## 2024-10-16 RX ORDER — LOSARTAN POTASSIUM 100 MG/1
100 TABLET ORAL DAILY
Qty: 90 TABLET | Refills: 3 | Status: SHIPPED | OUTPATIENT
Start: 2024-10-16

## 2024-10-16 RX ORDER — FLUDROCORTISONE ACETATE 0.1 MG/1
0.1 TABLET ORAL DAILY
Qty: 90 TABLET | Refills: 3 | Status: SHIPPED | OUTPATIENT
Start: 2024-10-16

## 2024-10-16 NOTE — TELEPHONE ENCOUNTER
Caller: Nesha Bacon    Relationship: Self    Best call back number: [phone]      What is the best time to reach you: ANYTIME    Who are you requesting to speak with (clinical staff, provider,  specific staff member): CLINICAL    Do you know the name of the person who called: NOT SURE    What was the call regarding: PATIENT IS RETURNING A MISSED CALL. IF DR. MARR'S OFFICE IS TRYING TO REACH PATIENT CALL BACK.    Is it okay if the provider responds through [facility]:  YES

## 2024-10-16 NOTE — PROGRESS NOTES
[facility] [place]  [facility]  [address] [place] [address]  Dept: [phone]  Dept Fax: [phone]    Nesha Bacon  1945    Follow Up Office Visit Note    History of Present Illness:  Nesha Bacon is a 79 y.o. female who presents to the clinic for  Orthostatic hypotension- She has stop taking the fludrocortisone 0,1  and she started having again dizziness after standing today BP drops from 120.70 to 80.60,  she stopped the meds because her BP was getting high, also has been in Er due to chest tightness  and her Hr has been slow, we told her last time to d,c the Toprol, but she restarted taking it, her Hr at Er was 50, she was told to lower to 12,5 , today HR is 61, when she walks feels her heart beating fast, just short distances , will get a Holter- she has had a CTA of the coronary  arteries with no significant disease    The following portions of the patient's history were reviewed and updated as appropriate: allergies, current medications, past family history, past medical history, past social history, past surgical history, and problem list.    Medications:  calcium carbonate  flecainide  fludrocortisone  losartan  potassium chloride  pramipexole  rivaroxaban  rosuvastatin    Subjective  Allergies   Allergen Reactions    Aspirin Palpitations     Chest tightness    Latex Rash        Past Medical History:   Diagnosis Date    Acute cystitis without hematuria     Age-related osteoporosis without current pathological fracture     Back pain     BMI 20.0-20.9, adult     Bone injury     BONE OR JOINT INJURIES    Chronic fatigue     Chronic obstructive pulmonary disease     Closed fracture of left hip, with routine healing, subsequent encounter     Lightheadedness     CHRONIC    Paroxysmal atrial fibrillation     Postoperative anemia     Pure hypercholesterolemia     Renal cancer     Shortness of breath        Past Surgical History:   Procedure Laterality  "Date    CATARACT EXTRACTION, BILATERAL      CHOLECYSTECTOMY      HIP FRACTURE SURGERY      LUMBAR LAMINECTOMY      NEPHRECTOMY PARTIAL      TUBAL ABDOMINAL LIGATION      VAGINAL HYSTERECTOMY SALPINGO OOPHORECTOMY WITH ANTERIOR AND POSTERIOR VAGINAL REPAIR         Family History   Problem Relation Age of Onset    Stroke Mother     Hypertension Mother     Coronary artery disease Brother         Social History     Socioeconomic History    Marital status: Single   Tobacco Use    Smoking status: Never    Smokeless tobacco: Never   Vaping Use    Vaping status: Never Used   Substance and Sexual Activity    Alcohol use: Never    Drug use: Never    Sexual activity: Not Currently       Review of Systems   Constitutional: Negative.    HENT: Negative.     Respiratory: Negative.     Cardiovascular: Negative.    Endocrine: Negative.    Genitourinary: Negative.    Musculoskeletal: Negative.    Skin: Negative.    Allergic/Immunologic: Negative.    Neurological: Negative.    Hematological: Negative.    Psychiatric/Behavioral: Negative.         Cardiovascular Procedures    ECHO/MUGA:  STRESS TESTS:   CARDIAC CATH:   DEVICES:   HOLTER:   CT/MRI:   VASCULAR:   CARDIOTHORACIC:     Objective  Vitals:    10/16/24 0956 10/16/24 1041   BP: 144/80 120/70   BP Location: Right arm    Patient Position: Lying    Cuff Size: Adult    Pulse: 75    Resp: 18    Temp: 97 °F (36.1 °C)    TempSrc: Infrared    SpO2: 99%    Weight: 63 kg (139 lb)    Height: 162.6 cm (64\")    PainSc: 0-No pain      Body mass index is 23.86 kg/m².     Physical Exam  Vitals reviewed.   Constitutional:       Appearance: Healthy appearance. Not in distress.   Neck:      Vascular: No JVR. JVD normal.   Pulmonary:      Effort: Pulmonary effort is normal.      Breath sounds: Normal breath sounds. No wheezing. No rhonchi. No rales.   Chest:      Chest wall: Not tender to palpatation.   Cardiovascular:      PMI at left midclavicular line. Normal rate. Regular rhythm. Normal S1. " Normal S2.       Murmurs: There is no murmur.      No gallop.  No click. No rub.   Pulses:     Intact distal pulses.   Edema:     Peripheral edema absent.   Abdominal:      General: Bowel sounds are normal.      Palpations: Abdomen is soft.      Tenderness: There is no abdominal tenderness.   Musculoskeletal: Normal range of motion.         General: No tenderness. Skin:     General: Skin is warm and dry.   Neurological:      General: No focal deficit present.      Mental Status: Alert and oriented to person, place and time.        Diagnostic Data    ECG 12 Lead    Date/Time: 10/16/2024 4:33 PM  Performed by: Adiel Kang MD    Authorized by: Adiel Kang MD  Comparison: compared with previous ECG from 5/29/2024  Similar to previous ECG  Rhythm: sinus rhythm  Rate: normal  BPM: 61  QRS axis: normal  Other findings: non-specific ST-T wave changes    Clinical impression: abnormal EKG        Assessment and Plan  Diagnoses and all orders for this visit:    Autonomic orthostatic hypotension-- Will restart fludrocortisone 0,1 daily    Essential hypertension- BP is 120.70 lying, on Losartan , she states at home is high will increase to 100 mg, she also has been taking 12,5 mg Toprol    Hypercholesterolemia- on Crestor 5 mg,     Paroxysmal atrial fibrillation- On Flecainide 50 mg bid, complaints of pounding heart on walking will get a Holter  -     Holter Monitor - 48 Hour; Future    Bradycardia, sinus- will give her a Holter  -     Holter Monitor - 48 Hour; Future    Other orders  -     fludrocortisone 0.1 MG tablet; Take 1 tablet by mouth Daily.  -     losartan (COZAAR) 100 MG tablet; Take 1 tablet by mouth Daily.         Return in about 2 weeks (around 10/30/2024) for Recheck with Dr. Kang.    Adiel Kang MD  10/16/2024

## 2024-10-17 ENCOUNTER — TELEPHONE (OUTPATIENT)
Dept: CARDIOLOGY | Facility: CLINIC | Age: 79
End: 2024-10-17
Payer: MEDICARE

## 2024-10-17 NOTE — TELEPHONE ENCOUNTER
Left a message for Ms Bacon to call back to confirm she got the following message:    Dr. Kang wanted you to wear a 48 holter monitor and we were able to authorize it with your insurance.  I see there is an appt for 10/24 to put on monitor and that is ok, however, if she would like to have this placed sooner she can come by any day M-F  and we would be happy to place.  Also, Dr. Kang would like us to draw a B12 lab on you when you come if this is ok with you.  I will try calling again tomorrow to see if you got this message.  Thank you.

## 2024-10-22 ENCOUNTER — TELEPHONE (OUTPATIENT)
Dept: CARDIOLOGY | Facility: CLINIC | Age: 79
End: 2024-10-22
Payer: MEDICARE

## 2024-10-22 NOTE — TELEPHONE ENCOUNTER
----- Message from Samantha BARROW sent at 10/22/2024  9:11 AM EDT -----    ----- Message -----  From: Adiel Kang MD  Sent: 10/19/2024   2:36 PM EDT  To: Samnatha Swan RN    Vit B12 is low normal 438, was 1048 2 years ago, discuss with PCP, low Vit B12 make Orthostatic hypotension worse

## 2024-10-25 RX ORDER — POTASSIUM CHLORIDE 750 MG/1
10 CAPSULE, EXTENDED RELEASE ORAL 2 TIMES DAILY
Qty: 60 CAPSULE | Refills: 0 | Status: SHIPPED | OUTPATIENT
Start: 2024-10-25

## 2024-10-30 ENCOUNTER — OFFICE VISIT (OUTPATIENT)
Dept: CARDIOLOGY | Facility: CLINIC | Age: 79
End: 2024-10-30
Payer: MEDICARE

## 2024-10-30 ENCOUNTER — TELEPHONE (OUTPATIENT)
Dept: CARDIOLOGY | Facility: CLINIC | Age: 79
End: 2024-10-30

## 2024-10-30 VITALS
RESPIRATION RATE: 16 BRPM | DIASTOLIC BLOOD PRESSURE: 70 MMHG | SYSTOLIC BLOOD PRESSURE: 122 MMHG | WEIGHT: 140 LBS | HEIGHT: 64 IN | HEART RATE: 68 BPM | TEMPERATURE: 98 F | OXYGEN SATURATION: 98 % | BODY MASS INDEX: 23.9 KG/M2

## 2024-10-30 DIAGNOSIS — I95.1 AUTONOMIC ORTHOSTATIC HYPOTENSION: Primary | ICD-10-CM

## 2024-10-30 DIAGNOSIS — I10 ESSENTIAL HYPERTENSION: ICD-10-CM

## 2024-10-30 DIAGNOSIS — R00.1 BRADYCARDIA, SINUS: ICD-10-CM

## 2024-10-30 DIAGNOSIS — I48.0 PAROXYSMAL ATRIAL FIBRILLATION: ICD-10-CM

## 2024-10-30 DIAGNOSIS — E78.00 HYPERCHOLESTEROLEMIA: ICD-10-CM

## 2024-10-30 PROCEDURE — 1160F RVW MEDS BY RX/DR IN RCRD: CPT | Performed by: INTERNAL MEDICINE

## 2024-10-30 PROCEDURE — 1159F MED LIST DOCD IN RCRD: CPT | Performed by: INTERNAL MEDICINE

## 2024-10-30 PROCEDURE — 3078F DIAST BP <80 MM HG: CPT | Performed by: INTERNAL MEDICINE

## 2024-10-30 PROCEDURE — 99214 OFFICE O/P EST MOD 30 MIN: CPT | Performed by: INTERNAL MEDICINE

## 2024-10-30 PROCEDURE — 3074F SYST BP LT 130 MM HG: CPT | Performed by: INTERNAL MEDICINE

## 2024-10-30 PROCEDURE — 93000 ELECTROCARDIOGRAM COMPLETE: CPT | Performed by: INTERNAL MEDICINE

## 2024-10-30 RX ORDER — DROXIDOPA 100 MG/1
CAPSULE ORAL
Qty: 3 CAPSULE | Refills: 0 | Status: SHIPPED | OUTPATIENT
Start: 2024-10-30 | End: 2024-10-30 | Stop reason: SDUPTHER

## 2024-10-30 RX ORDER — DROXIDOPA 100 MG/1
CAPSULE ORAL
Qty: 200 CAPSULE | Refills: 0 | Status: SHIPPED | OUTPATIENT
Start: 2024-10-30 | End: 2025-01-28

## 2024-10-30 NOTE — TELEPHONE ENCOUNTER
NEEDS NEW SCRIPT FOR     Droxidopa (Northera) 100 MG capsule [[id number]] (Order [id number])     PLEASE SEND TO PHARMACY

## 2024-10-30 NOTE — TELEPHONE ENCOUNTER
"Agency [facility] needs clarification on instructions to take Northera.    Per Pearl's note, \"Autonomic orthostatic hypotension- not better after we restart the Fludrocortisone, will start Northera 100 mg bid .\"  However, what was sent to the pharmacy was \"Take 1 capsule by mouth 2 (Two) Times a Day AND 2 capsules 2 (Two) Times a Day. Do all this for 90 days. Then increase to 200 mg bid for 2 weeks .\"    Which instructions are correct? If they are the latter, please clarify with pharmacy as these instructions are not exactly clear.  "

## 2024-10-30 NOTE — TELEPHONE ENCOUNTER
will add Northera 100 mg bid x 2 weeks and then increase to 200 mg bid, will see her in 1 month  spoke with pharmacy

## 2024-10-30 NOTE — PROGRESS NOTES
[facility]  [facility] CARDIOLOGY  [address] [place] [address]  Dept: [phone]  Dept Fax: [phone]    Nesha Bacon  1945    Follow Up Office Visit Note    History of Present Illness:  Nesha Bacon is a 79 y.o. female who presents to the clinic for Follow-up.Orthostatic Hypotension- She still feels dizzy almost every time she stands up her BP is 120.65 lying and standing at 1 minute 95/60 then at 3 minutes recover to 120.60, will add Northera 100 mg bid  x 2 weeks and then increase to 200 mg bid, will see her in 1 month    The following portions of the patient's history were reviewed and updated as appropriate: allergies, current medications, past family history, past medical history, past social history, past surgical history, and problem list.    Medications:  calcium carbonate  flecainide  fludrocortisone  losartan  potassium chloride  pramipexole  rivaroxaban  rosuvastatin    Subjective  Allergies   Allergen Reactions    Aspirin Palpitations     Chest tightness    Latex Rash        Past Medical History:   Diagnosis Date    Acute cystitis without hematuria     Age-related osteoporosis without current pathological fracture     Back pain     BMI 20.0-20.9, adult     Bone injury     BONE OR JOINT INJURIES    Chronic fatigue     Chronic obstructive pulmonary disease     Closed fracture of left hip, with routine healing, subsequent encounter     Lightheadedness     CHRONIC    Paroxysmal atrial fibrillation     Postoperative anemia     Pure hypercholesterolemia     Renal cancer     Shortness of breath        Past Surgical History:   Procedure Laterality Date    CATARACT EXTRACTION, BILATERAL      CHOLECYSTECTOMY      HIP FRACTURE SURGERY      LUMBAR LAMINECTOMY      NEPHRECTOMY PARTIAL      TUBAL ABDOMINAL LIGATION      VAGINAL HYSTERECTOMY SALPINGO OOPHORECTOMY WITH ANTERIOR AND POSTERIOR VAGINAL REPAIR         Family History   Problem Relation Age of  "Onset    Stroke Mother     Hypertension Mother     Coronary artery disease Brother         Social History     Socioeconomic History    Marital status: Single   Tobacco Use    Smoking status: Never    Smokeless tobacco: Never   Vaping Use    Vaping status: Never Used   Substance and Sexual Activity    Alcohol use: Never    Drug use: Never    Sexual activity: Not Currently       Review of Systems   Neurological:  Positive for dizziness.       Cardiovascular Procedures    ECHO/MUGA:  STRESS TESTS:   CARDIAC CATH:   DEVICES:   HOLTER:   CT/MRI:   VASCULAR:   CARDIOTHORACIC:     Objective  Vitals:    10/30/24 0918   BP: 122/70   BP Location: Right arm   Patient Position: Lying   Cuff Size: Adult   Pulse: 68   Resp: 16   Temp: 98 °F (36.7 °C)   TempSrc: Infrared   SpO2: 98%   Weight: 63.5 kg (140 lb)   Height: 162.6 cm (64\")   PainSc: 0-No pain     Body mass index is 24.03 kg/m².     Physical Exam  Vitals reviewed.   Constitutional:       Appearance: Healthy appearance. Not in distress.   Neck:      Vascular: No JVR. JVD normal.   Pulmonary:      Effort: Pulmonary effort is normal.      Breath sounds: Normal breath sounds. No wheezing. No rhonchi. No rales.   Chest:      Chest wall: Not tender to palpatation.   Cardiovascular:      PMI at left midclavicular line. Normal rate. Regular rhythm. Normal S1. Normal S2.       Murmurs: There is no murmur.      No gallop.  No click. No rub.   Pulses:     Intact distal pulses.   Edema:     Peripheral edema absent.   Abdominal:      General: Bowel sounds are normal.      Palpations: Abdomen is soft.      Tenderness: There is no abdominal tenderness.   Musculoskeletal: Normal range of motion.         General: No tenderness. Skin:     General: Skin is warm and dry.   Neurological:      General: No focal deficit present.      Mental Status: Alert and oriented to person, place and time.        Diagnostic Data    ECG 12 Lead    Date/Time: 10/30/2024 9:52 AM  Performed by: Adiel Kang" MD Reddy    Authorized by: Adiel Kang MD  Comparison: compared with previous ECG from 10/16/2024  Similar to previous ECG  Rhythm: sinus rhythm  Rate: normal  BPM: 61  QRS axis: normal  Other findings: non-specific ST-T wave changes    Clinical impression: normal ECG        Assessment and Plan  Diagnoses and all orders for this visit:    Autonomic orthostatic hypotension- not better after we restart the Fludrocortisone, will start Northera 100 mg bid    Bradycardia, sinus- HR is 61 today, she states takes 12,5 daily, Toprol, she has the Holter with short runs of atrial tachycardia    Essential hypertension- BP is 120.60 lying and after standing 95.60, on Losartan 100 mg and also Toprol xl 12,5    Hypercholesterolemia- On Crestor 5 mg    Paroxysmal atrial fibrillation- recent Holter atrial tachycardia short episodes, no AF, on Flecainide 50 mg bid and also Xarelto 20 mg         No follow-ups on file.    Adiel Kang MD  10/30/2024

## 2024-11-05 ENCOUNTER — TELEPHONE (OUTPATIENT)
Dept: CARDIOLOGY | Facility: CLINIC | Age: 79
End: 2024-11-05
Payer: MEDICARE

## 2024-11-06 ENCOUNTER — OUTSIDE FACILITY SERVICE (OUTPATIENT)
Dept: CARDIOLOGY | Facility: CLINIC | Age: 79
End: 2024-11-06
Payer: MEDICARE

## 2024-11-06 PROCEDURE — 99222 1ST HOSP IP/OBS MODERATE 55: CPT | Performed by: INTERNAL MEDICINE

## 2024-11-06 PROCEDURE — 93306 TTE W/DOPPLER COMPLETE: CPT | Performed by: INTERNAL MEDICINE

## 2024-11-07 ENCOUNTER — OUTSIDE FACILITY SERVICE (OUTPATIENT)
Dept: CARDIOLOGY | Facility: CLINIC | Age: 79
End: 2024-11-07
Payer: MEDICARE

## 2024-11-07 PROCEDURE — 99232 SBSQ HOSP IP/OBS MODERATE 35: CPT | Performed by: INTERNAL MEDICINE

## 2024-11-08 ENCOUNTER — TELEPHONE (OUTPATIENT)
Dept: CARDIOLOGY | Facility: CLINIC | Age: 79
End: 2024-11-08
Payer: MEDICARE

## 2024-11-08 NOTE — TELEPHONE ENCOUNTER
Droxidopa 100mg capsules  Outcome  Approved on November 4 by [employer] 2017  PA Case: [id number], Status: Approved, Coverage Starts on: 10/1/2024 12:00:00 AM, Coverage Ends on: 2/2/2025 12:00:00 AM.

## 2024-11-18 ENCOUNTER — OFFICE VISIT (OUTPATIENT)
Dept: FAMILY MEDICINE CLINIC | Facility: CLINIC | Age: 79
End: 2024-11-18
Payer: MEDICARE

## 2024-11-18 VITALS
WEIGHT: 140 LBS | HEART RATE: 88 BPM | DIASTOLIC BLOOD PRESSURE: 72 MMHG | BODY MASS INDEX: 23.9 KG/M2 | HEIGHT: 64 IN | OXYGEN SATURATION: 98 % | SYSTOLIC BLOOD PRESSURE: 118 MMHG

## 2024-11-18 DIAGNOSIS — M80.80XS LOCALIZED OSTEOPOROSIS WITH CURRENT PATHOLOGICAL FRACTURE, SEQUELA: ICD-10-CM

## 2024-11-18 DIAGNOSIS — S22.040S COMPRESSION FRACTURE OF T4 VERTEBRA, SEQUELA: Primary | ICD-10-CM

## 2024-11-18 PROBLEM — S22.040A COMPRESSION FRACTURE OF T4 VERTEBRA: Status: ACTIVE | Noted: 2024-11-18

## 2024-11-18 PROBLEM — M81.0 AGE-RELATED OSTEOPOROSIS WITHOUT CURRENT PATHOLOGICAL FRACTURE: Status: RESOLVED | Noted: 2022-08-24 | Resolved: 2024-11-18

## 2024-11-18 PROCEDURE — G2211 COMPLEX E/M VISIT ADD ON: HCPCS | Performed by: PHYSICIAN ASSISTANT

## 2024-11-18 PROCEDURE — 1160F RVW MEDS BY RX/DR IN RCRD: CPT | Performed by: PHYSICIAN ASSISTANT

## 2024-11-18 PROCEDURE — 1159F MED LIST DOCD IN RCRD: CPT | Performed by: PHYSICIAN ASSISTANT

## 2024-11-18 PROCEDURE — 99213 OFFICE O/P EST LOW 20 MIN: CPT | Performed by: PHYSICIAN ASSISTANT

## 2024-11-18 PROCEDURE — 3074F SYST BP LT 130 MM HG: CPT | Performed by: PHYSICIAN ASSISTANT

## 2024-11-18 PROCEDURE — 1126F AMNT PAIN NOTED NONE PRSNT: CPT | Performed by: PHYSICIAN ASSISTANT

## 2024-11-18 PROCEDURE — 3078F DIAST BP <80 MM HG: CPT | Performed by: PHYSICIAN ASSISTANT

## 2024-11-18 NOTE — PROGRESS NOTES
"Chief Complaint  Hospital Follow Up Visit (Pt is here for a hospital follow up from [facility] [place] pain.)    Subjective          Nesha Bacon presents to [facility]    History of Present Illness patient is here today to follow-up on her recent hospitalization for back pain which turned out that she had a T4 compression fracture.  She was advised to see a spine doctor for this and she is here specifically for that referral.  She has been seeing a bone and mineral physician at the [facility] for her osteoporosis and she does take the Prolia shots every 6 months.  She is now wearing a brace and it does seem to be giving her some relief    Objective   Vital Signs:   /72 (BP Location: Right arm, Patient Position: Sitting, Cuff Size: Adult)   Pulse 88   Ht 162.6 cm (64\")   Wt 63.5 kg (140 lb)   SpO2 98%   BMI 24.03 kg/m²     Body mass index is 24.03 kg/m².    Review of Systems   Constitutional:  Negative for chills, fatigue and fever.   Respiratory:  Negative for cough and shortness of breath.    Cardiovascular:  Negative for chest pain and palpitations.   Musculoskeletal:  Positive for back pain. Negative for myalgias.   Neurological:  Negative for dizziness and headache.   Psychiatric/Behavioral:  Negative for depressed mood. The patient is not nervous/anxious.          Past History:  Medical History: has a past medical history of Acute cystitis without hematuria, Age-related osteoporosis without current pathological fracture, Back pain, BMI 20.0-20.9, adult, Bone injury, Chronic fatigue, Chronic obstructive pulmonary disease, Closed fracture of left hip, with routine healing, subsequent encounter, Lightheadedness, Paroxysmal atrial fibrillation, Postoperative anemia, Pure hypercholesterolemia, Renal cancer, and Shortness of breath.   Surgical History: has a past surgical history that includes Partial nephrectomy; Lumbar laminectomy; vaginal hysterectomy " salpingo oophorectomy with anterior and posterior vaginal repair; Cataract extraction, bilateral; Cholecystectomy; Tubal ligation; and Hip fracture surgery.   Family History: family history includes Coronary artery disease in her brother; Hypertension in her mother; Stroke in her mother.   Social History: reports that she has never smoked. She has never used smokeless tobacco. She reports that she does not drink alcohol and does not use drugs.      Current Outpatient Medications:     calcium carbonate (OS-ROLAND) 1250 (500 Ca) MG tablet, Take  by mouth Daily., Disp: , Rfl:     Droxidopa (Northera) 100 MG capsule, Take 1 capsule by mouth 2 (Two) Times a Day AND 2 capsules 2 (Two) Times a Day. Do all this for 90 days. Then increase to 200 mg bid for 2 weeks, Disp: 200 capsule, Rfl: 0    flecainide (TAMBOCOR) 50 MG tablet, Take 1 tablet by mouth twice daily, Disp: 180 tablet, Rfl: 0    fludrocortisone 0.1 MG tablet, Take 1 tablet by mouth Daily., Disp: 90 tablet, Rfl: 3    losartan (COZAAR) 100 MG tablet, Take 1 tablet by mouth Daily., Disp: 90 tablet, Rfl: 3    potassium chloride (MICRO-K) 10 MEQ CR capsule, Take 1 capsule by mouth twice daily, Disp: 60 capsule, Rfl: 0    pramipexole (MIRAPEX) 0.25 MG tablet, Take 1 tablet by mouth Every Night., Disp: 90 tablet, Rfl: 3    rivaroxaban (XARELTO) 20 MG tablet, Take 1 tablet by mouth Daily., Disp: 90 tablet, Rfl: 1    rosuvastatin (CRESTOR) 5 MG tablet, Take 1 tablet by mouth Daily., Disp: 90 tablet, Rfl: 3    Allergies: Aspirin and Latex    Physical Exam  Vitals reviewed.   Constitutional:       Appearance: Normal appearance.   Cardiovascular:      Rate and Rhythm: Normal rate and regular rhythm.      Heart sounds: Normal heart sounds.   Pulmonary:      Effort: Pulmonary effort is normal.      Breath sounds: Normal breath sounds.   Musculoskeletal:         General: Normal range of motion.      Comments: Wearing full back brace   Neurological:      General: No focal deficit  present.      Mental Status: She is alert and oriented to person, place, and time.   Psychiatric:         Mood and Affect: Mood normal.          Result Review :                   Assessment and Plan    Diagnoses and all orders for this visit:    1. Compression fracture of T4 vertebra, sequela (Primary)  Assessment & Plan:   I am going to refer her to a orthopedic spine specialist for this at  since she has been seeing a bone and mineral specialist there for her osteoporosis. She may be a candidate for Kyphoplasty since this appears to be an acute compression fracture.     Orders:  -     Ambulatory Referral to Orthopedic Surgery    2. Localized osteoporosis with current pathological fracture, sequela  Assessment & Plan:  See plan above.     Orders:  -     Ambulatory Referral to Orthopedic Surgery        Follow Up   Return if symptoms worsen or fail to improve, for Keep appt as scheduled in February .  Patient was given instructions and counseling regarding her condition or for health maintenance advice. Please see specific information pulled into the AVS if appropriate.     Julia Foote PA-C

## 2024-11-18 NOTE — ASSESSMENT & PLAN NOTE
I am going to refer her to a orthopedic spine specialist for this at  since she has been seeing a bone and mineral specialist there for her osteoporosis. She may be a candidate for Kyphoplasty since this appears to be an acute compression fracture.

## 2024-11-21 RX ORDER — POTASSIUM CHLORIDE 750 MG/1
10 CAPSULE, EXTENDED RELEASE ORAL 2 TIMES DAILY
Qty: 60 CAPSULE | Refills: 0 | Status: SHIPPED | OUTPATIENT
Start: 2024-11-21

## 2024-11-27 ENCOUNTER — OFFICE VISIT (OUTPATIENT)
Dept: CARDIOLOGY | Facility: CLINIC | Age: 79
End: 2024-11-27
Payer: MEDICARE

## 2024-11-27 VITALS
BODY MASS INDEX: 23.9 KG/M2 | HEIGHT: 64 IN | DIASTOLIC BLOOD PRESSURE: 67 MMHG | RESPIRATION RATE: 18 BRPM | TEMPERATURE: 96 F | OXYGEN SATURATION: 94 % | SYSTOLIC BLOOD PRESSURE: 112 MMHG | HEART RATE: 89 BPM | WEIGHT: 140 LBS

## 2024-11-27 DIAGNOSIS — I10 ESSENTIAL HYPERTENSION: ICD-10-CM

## 2024-11-27 DIAGNOSIS — I95.1 AUTONOMIC ORTHOSTATIC HYPOTENSION: Primary | ICD-10-CM

## 2024-11-27 DIAGNOSIS — E78.00 HYPERCHOLESTEROLEMIA: ICD-10-CM

## 2024-11-27 DIAGNOSIS — I48.0 PAROXYSMAL ATRIAL FIBRILLATION: ICD-10-CM

## 2024-11-27 DIAGNOSIS — D50.0 ANEMIA DUE TO GI BLOOD LOSS: ICD-10-CM

## 2024-11-27 PROBLEM — R00.1 BRADYCARDIA, SINUS: Status: RESOLVED | Noted: 2024-10-16 | Resolved: 2024-11-27

## 2024-11-27 RX ORDER — METOPROLOL SUCCINATE 25 MG/1
25 TABLET, EXTENDED RELEASE ORAL DAILY
Qty: 30 TABLET | Refills: 11 | Status: SHIPPED | OUTPATIENT
Start: 2024-11-27

## 2024-11-27 RX ORDER — LOSARTAN POTASSIUM 50 MG/1
50 TABLET ORAL DAILY
Qty: 90 TABLET | Refills: 3 | Status: SHIPPED | OUTPATIENT
Start: 2024-11-27

## 2024-11-27 RX ORDER — DROXIDOPA 300 MG/1
300 CAPSULE ORAL 2 TIMES DAILY
Qty: 180 CAPSULE | Refills: 2 | Status: SHIPPED | OUTPATIENT
Start: 2024-11-27

## 2024-11-27 NOTE — PROGRESS NOTES
[facility] [place]  [facility] CARDIOLOGY  [address] [place] [address]  Dept: [phone]  Dept Fax: [phone]    Nesha Bacon  1945    Follow Up Office Visit Note    History of Present Illness:  Nesha Bacon is a 79 y.o. female who presents to the clinic for Follow-up. Orthostatic hypotension-  she feels dizzy after standing on Fludrocortisone  01 mg and also Northera 200 mg bid,  we did check BP sitting and standing BP is 105.60 and after standing is 100.60, at 3 minutes,  will increase the Northera to 300 mg bid, , she also complaints of palpitations on walking and has been in sinus on Flecainide 50 mg bid, recent Holter has episode of atrial tachycardia multiples, seems worse after we have d,c the Toprol.   Due to Bradycardia, today HR is 89 will restart 25 mg daily, keep flecainide 50 mg bid     The following portions of the patient's history were reviewed and updated as appropriate: allergies, current medications, past family history, past medical history, past social history, past surgical history, and problem list.    Medications:  calcium carbonate  droxidopa capsule  flecainide  fludrocortisone  losartan  metoprolol succinate XL  potassium chloride  pramipexole  rivaroxaban  rosuvastatin    Subjective  Allergies   Allergen Reactions   • Aspirin Palpitations     Chest tightness   • Latex Rash        Past Medical History:   Diagnosis Date   • Acute cystitis without hematuria    • Age-related osteoporosis without current pathological fracture    • Back pain    • BMI 20.0-20.9, adult    • Bone injury     BONE OR JOINT INJURIES   • Chronic fatigue    • Chronic obstructive pulmonary disease    • Closed fracture of left hip, with routine healing, subsequent encounter    • Lightheadedness     CHRONIC   • Paroxysmal atrial fibrillation    • Postoperative anemia    • Pure hypercholesterolemia    • Renal cancer    • Shortness of breath        Past Surgical History:  "  Procedure Laterality Date   • CATARACT EXTRACTION, BILATERAL     • CHOLECYSTECTOMY     • HIP FRACTURE SURGERY     • LUMBAR LAMINECTOMY     • NEPHRECTOMY PARTIAL     • TUBAL ABDOMINAL LIGATION     • VAGINAL HYSTERECTOMY SALPINGO OOPHORECTOMY WITH ANTERIOR AND POSTERIOR VAGINAL REPAIR         Family History   Problem Relation Age of Onset   • Stroke Mother    • Hypertension Mother    • Coronary artery disease Brother         Social History     Socioeconomic History   • Marital status: Single   Tobacco Use   • Smoking status: Never   • Smokeless tobacco: Never   Vaping Use   • Vaping status: Never Used   Substance and Sexual Activity   • Alcohol use: Never   • Drug use: Never   • Sexual activity: Not Currently       Review of Systems   Constitutional: Negative.    HENT: Negative.     Respiratory: Negative.     Cardiovascular: Negative.    Endocrine: Negative.    Genitourinary: Negative.    Musculoskeletal: Negative.    Skin: Negative.    Allergic/Immunologic: Negative.    Neurological: Negative.    Hematological: Negative.    Psychiatric/Behavioral: Negative.       Cardiovascular Procedures    ECHO/MUGA:  STRESS TESTS:   CARDIAC CATH:   DEVICES:   HOLTER:   CT/MRI:   VASCULAR:   CARDIOTHORACIC:     Objective  Vitals:    11/27/24 0932   BP: 112/67   BP Location: Right arm   Patient Position: Lying   Cuff Size: Adult   Pulse: 89   Resp: 18   Temp: 96 °F (35.6 °C)   TempSrc: Infrared   SpO2: 94%   Weight: 63.5 kg (140 lb)   Height: 162.6 cm (64\")   PainSc:   6   PainLoc: Back     Body mass index is 24.03 kg/m².     Physical Exam  Vitals reviewed.   Constitutional:       Appearance: Healthy appearance. Not in distress.   Neck:      Vascular: No JVR. JVD normal.   Pulmonary:      Effort: Pulmonary effort is normal.      Breath sounds: Normal breath sounds. No wheezing. No rhonchi. No rales.   Chest:      Chest wall: Not tender to palpatation.   Cardiovascular:      PMI at left midclavicular line. Normal rate. Regular " rhythm. Normal S1. Normal S2.       Murmurs: There is no murmur.      No gallop.  No click. No rub.   Pulses:     Intact distal pulses.   Edema:     Peripheral edema absent.   Abdominal:      General: Bowel sounds are normal.      Palpations: Abdomen is soft.      Tenderness: There is no abdominal tenderness.   Musculoskeletal: Normal range of motion.         General: No tenderness. Skin:     General: Skin is warm and dry.   Neurological:      General: No focal deficit present.      Mental Status: Alert and oriented to person, place and time.        Diagnostic Data    ECG 12 Lead    Date/Time: 11/27/2024 1:36 PM  Performed by: Adiel Kang MD    Authorized by: Adiel Kang MD  Comparison: compared with previous ECG from 11/6/2024  Similar to previous ECG  Rhythm: sinus rhythm  Rate: normal  BPM: 89  Conduction: incomplete left bundle branch block and non-specific intraventricular conduction delay  QRS axis: normal  Other findings: non-specific ST-T wave changes    Clinical impression: abnormal EKG        Assessment and Plan  Diagnoses and all orders for this visit:    Autonomic orthostatic hypotension-  will increase Northera to 300 mg bid, keep Fludrocortisone   -     Folate    Essential hypertension- BP is 110.60, will lower Losartan to 50 mg, and restart Toprol xl 25 mg  -     Folate    Hypercholesterolemia- on Crestor 5 mg,  -     Folate    Paroxysmal atrial fibrillation- On flecainide 50 mg bid and also Xarelto 20 mg  -     Comprehensive Metabolic Panel  -     CBC & Differential  -     Cancel: Iron Profile; Future  -     Cancel: Ferritin; Future  -     Cancel: Vitamin B12; Future  -     Cancel: Folate; Future  -     Ferritin  -     Cancel: Folate  -     Iron Profile  -     Vitamin B12  -     Folate    Anemia due to GI blood loss  -     Comprehensive Metabolic Panel  -     CBC & Differential  -     Cancel: Iron Profile; Future  -     Cancel: Ferritin; Future  -     Cancel: Vitamin B12;  Future  -     Cancel: Folate; Future  -     Sedimentation Rate  -     Ferritin  -     Cancel: Folate  -     Iron Profile  -     Vitamin B12  -     Folate    Other orders  -     losartan (COZAAR) 50 MG tablet; Take 1 tablet by mouth Daily.  -     droxidopa (Northera) 300 MG capsule capsule; Take 1 capsule by mouth 2 (Two) Times a Day.  -     metoprolol succinate XL (TOPROL-XL) 25 MG 24 hr tablet; Take 1 tablet by mouth Daily.         Return in about 3 months (around 2/27/2025) for Recheck with Dr. Kang.    Adiel Kang MD  11/27/2024

## 2024-11-28 LAB
ALBUMIN SERPL-MCNC: 4.4 G/DL (ref 3.8–4.8)
ALP SERPL-CCNC: 120 IU/L (ref 44–121)
ALT SERPL-CCNC: 13 IU/L (ref 0–32)
AST SERPL-CCNC: 24 IU/L (ref 0–40)
BASOPHILS # BLD AUTO: 0 X10E3/UL (ref 0–0.2)
BASOPHILS NFR BLD AUTO: 1 %
BILIRUB SERPL-MCNC: 1 MG/DL (ref 0–1.2)
BUN SERPL-MCNC: 19 MG/DL (ref 8–27)
BUN/CREAT SERPL: 22 (ref 12–28)
CALCIUM SERPL-MCNC: 9.9 MG/DL (ref 8.7–10.3)
CHLORIDE SERPL-SCNC: 105 MMOL/L (ref 96–106)
CO2 SERPL-SCNC: 19 MMOL/L (ref 20–29)
CREAT SERPL-MCNC: 0.88 MG/DL (ref 0.57–1)
EGFRCR SERPLBLD CKD-EPI 2021: 67 ML/MIN/1.73
EOSINOPHIL # BLD AUTO: 0 X10E3/UL (ref 0–0.4)
EOSINOPHIL NFR BLD AUTO: 0 %
ERYTHROCYTE [DISTWIDTH] IN BLOOD BY AUTOMATED COUNT: 13.3 % (ref 11.7–15.4)
ERYTHROCYTE [SEDIMENTATION RATE] IN BLOOD BY WESTERGREN METHOD: 22 MM/HR (ref 0–40)
FERRITIN SERPL-MCNC: 69 NG/ML (ref 15–150)
FOLATE SERPL-MCNC: 14.2 NG/ML
GLOBULIN SER CALC-MCNC: 2.9 G/DL (ref 1.5–4.5)
GLUCOSE SERPL-MCNC: 78 MG/DL (ref 70–99)
HCT VFR BLD AUTO: 39.8 % (ref 34–46.6)
HGB BLD-MCNC: 12.7 G/DL (ref 11.1–15.9)
IMM GRANULOCYTES # BLD AUTO: 0 X10E3/UL (ref 0–0.1)
IMM GRANULOCYTES NFR BLD AUTO: 0 %
IRON SATN MFR SERPL: 31 % (ref 15–55)
IRON SERPL-MCNC: 98 UG/DL (ref 27–139)
LYMPHOCYTES # BLD AUTO: 1.8 X10E3/UL (ref 0.7–3.1)
LYMPHOCYTES NFR BLD AUTO: 35 %
MCH RBC QN AUTO: 32.4 PG (ref 26.6–33)
MCHC RBC AUTO-ENTMCNC: 31.9 G/DL (ref 31.5–35.7)
MCV RBC AUTO: 102 FL (ref 79–97)
MONOCYTES # BLD AUTO: 0.3 X10E3/UL (ref 0.1–0.9)
MONOCYTES NFR BLD AUTO: 6 %
NEUTROPHILS # BLD AUTO: 2.9 X10E3/UL (ref 1.4–7)
NEUTROPHILS NFR BLD AUTO: 58 %
PLATELET # BLD AUTO: 410 X10E3/UL (ref 150–450)
POTASSIUM SERPL-SCNC: 3.8 MMOL/L (ref 3.5–5.2)
PROT SERPL-MCNC: 7.3 G/DL (ref 6–8.5)
RBC # BLD AUTO: 3.92 X10E6/UL (ref 3.77–5.28)
SODIUM SERPL-SCNC: 143 MMOL/L (ref 134–144)
TIBC SERPL-MCNC: 320 UG/DL (ref 250–450)
UIBC SERPL-MCNC: 222 UG/DL (ref 118–369)
VIT B12 SERPL-MCNC: 650 PG/ML (ref 232–1245)
WBC # BLD AUTO: 5 X10E3/UL (ref 3.4–10.8)

## 2024-12-02 ENCOUNTER — TELEPHONE (OUTPATIENT)
Dept: CARDIOLOGY | Facility: CLINIC | Age: 79
End: 2024-12-02
Payer: MEDICARE

## 2024-12-02 NOTE — TELEPHONE ENCOUNTER
----- Message from Samantha BARROW sent at 12/2/2024  9:39 AM EST -----    ----- Message -----  From: Adiel Kang MD  Sent: 11/28/2024   8:33 AM EST  To: Samantha Swan RN    Lab are good including Vit B12 and folic acid, HB have improved to 12,7 from the lab at the [facility]

## 2024-12-20 RX ORDER — POTASSIUM CHLORIDE 750 MG/1
10 CAPSULE, EXTENDED RELEASE ORAL 2 TIMES DAILY
Qty: 60 CAPSULE | Refills: 0 | Status: SHIPPED | OUTPATIENT
Start: 2024-12-20

## 2025-01-08 RX ORDER — FLECAINIDE ACETATE 50 MG/1
50 TABLET ORAL 2 TIMES DAILY
Qty: 180 TABLET | Refills: 0 | Status: SHIPPED | OUTPATIENT
Start: 2025-01-08

## 2025-01-17 RX ORDER — POTASSIUM CHLORIDE 750 MG/1
10 CAPSULE, EXTENDED RELEASE ORAL 2 TIMES DAILY
Qty: 60 CAPSULE | Refills: 0 | Status: SHIPPED | OUTPATIENT
Start: 2025-01-17

## 2025-02-06 RX ORDER — PRAMIPEXOLE DIHYDROCHLORIDE 0.25 MG/1
0.25 TABLET ORAL NIGHTLY
Qty: 90 TABLET | Refills: 0 | Status: SHIPPED | OUTPATIENT
Start: 2025-02-06

## 2025-02-17 RX ORDER — POTASSIUM CHLORIDE 750 MG/1
10 CAPSULE, EXTENDED RELEASE ORAL 2 TIMES DAILY
Qty: 60 CAPSULE | Refills: 5 | Status: SHIPPED | OUTPATIENT
Start: 2025-02-17

## 2025-02-27 ENCOUNTER — OFFICE VISIT (OUTPATIENT)
Dept: CARDIOLOGY | Facility: CLINIC | Age: 80
End: 2025-02-27
Payer: MEDICARE

## 2025-02-27 VITALS
DIASTOLIC BLOOD PRESSURE: 72 MMHG | SYSTOLIC BLOOD PRESSURE: 114 MMHG | BODY MASS INDEX: 24.59 KG/M2 | RESPIRATION RATE: 16 BRPM | HEIGHT: 64 IN | TEMPERATURE: 97 F | WEIGHT: 144 LBS | HEART RATE: 84 BPM | OXYGEN SATURATION: 98 %

## 2025-02-27 DIAGNOSIS — I95.1 AUTONOMIC ORTHOSTATIC HYPOTENSION: Primary | ICD-10-CM

## 2025-02-27 DIAGNOSIS — I48.0 PAROXYSMAL ATRIAL FIBRILLATION: ICD-10-CM

## 2025-02-27 DIAGNOSIS — E78.00 HYPERCHOLESTEROLEMIA: ICD-10-CM

## 2025-02-27 DIAGNOSIS — I10 ESSENTIAL HYPERTENSION: ICD-10-CM

## 2025-02-27 RX ORDER — LOSARTAN POTASSIUM 100 MG/1
100 TABLET ORAL DAILY
Start: 2025-02-27

## 2025-02-27 NOTE — PROGRESS NOTES
[facility] [place]  [facility]  [address] [place] [address]  Dept: [phone]  Dept Fax: [phone]    Nesha Bacon  1945    Follow Up Office Visit Note    History of Present Illness:  Nesha Bacon is a 80 y.o. female who presents to the clinic for PAF- and OH- She is on Flecainide 50 mg bid and also Toprol xl 25 mg, she feels dizzy after standing, she has OH and she thinks her meds are causing her complaints, has stopped the Northera 300 mg bid, she still feels dizzy abut she thinks is better, she is also on Fludrocortisone 0.1 daily., her BP lying is 160.80 and standing 140.80 at 1 minute and 125.80 at 3 minutes, she was feeling dizzy, during the whole time, her EKG sinus HR 69,  she denies any palpitations,she wants to see a EP cardiologist and will set her to see Dr Gilbert    The following portions of the patient's history were reviewed and updated as appropriate: allergies, current medications, past family history, past medical history, past social history, past surgical history, and problem list.    Medications:  calcium carbonate  droxidopa capsule  flecainide  fludrocortisone  losartan  metoprolol succinate XL  potassium chloride  pramipexole  rivaroxaban  rosuvastatin    Subjective  Allergies   Allergen Reactions    Aspirin Palpitations     Chest tightness    Latex Rash        Past Medical History:   Diagnosis Date    Acute cystitis without hematuria     Age-related osteoporosis without current pathological fracture     Back pain     BMI 20.0-20.9, adult     Bone injury     BONE OR JOINT INJURIES    Chronic fatigue     Chronic obstructive pulmonary disease     Closed fracture of left hip, with routine healing, subsequent encounter     Lightheadedness     CHRONIC    Paroxysmal atrial fibrillation     Postoperative anemia     Pure hypercholesterolemia     Renal cancer     Shortness of breath        Past Surgical History:   Procedure Laterality Date  "   CATARACT EXTRACTION, BILATERAL      CHOLECYSTECTOMY      HIP FRACTURE SURGERY      LUMBAR LAMINECTOMY      NEPHRECTOMY PARTIAL      TUBAL ABDOMINAL LIGATION      VAGINAL HYSTERECTOMY SALPINGO OOPHORECTOMY WITH ANTERIOR AND POSTERIOR VAGINAL REPAIR         Family History   Problem Relation Age of Onset    Stroke Mother     Hypertension Mother     Coronary artery disease Brother         Social History     Socioeconomic History    Marital status: Single   Tobacco Use    Smoking status: Never    Smokeless tobacco: Never   Vaping Use    Vaping status: Never Used   Substance and Sexual Activity    Alcohol use: Never    Drug use: Never    Sexual activity: Not Currently       Review of Systems   Constitutional: Negative.    HENT: Negative.     Respiratory: Negative.     Cardiovascular: Negative.    Endocrine: Negative.    Genitourinary: Negative.    Musculoskeletal: Negative.    Skin: Negative.    Allergic/Immunologic: Negative.    Neurological:  Positive for dizziness and light-headedness.   Hematological: Negative.    Psychiatric/Behavioral: Negative.         Cardiovascular Procedures    ECHO/MUGA:  STRESS TESTS:   CARDIAC CATH:   DEVICES:   HOLTER:   CT/MRI:   VASCULAR:   CARDIOTHORACIC:     Objective  Vitals:    02/27/25 1131   BP: 114/72   BP Location: Right arm   Patient Position: Lying   Cuff Size: Adult   Pulse: 84   Resp: 16   Temp: 97 °F (36.1 °C)   TempSrc: Infrared   SpO2: 98%   Weight: 65.3 kg (144 lb)   Height: 162.6 cm (64\")   PainSc: 0-No pain     Body mass index is 24.72 kg/m².     Physical Exam  Vitals reviewed.   Constitutional:       Appearance: Healthy appearance. Not in distress.   Neck:      Vascular: No JVR. JVD normal.   Pulmonary:      Effort: Pulmonary effort is normal.      Breath sounds: Normal breath sounds. No wheezing. No rhonchi. No rales.   Chest:      Chest wall: Not tender to palpatation.   Cardiovascular:      PMI at left midclavicular line. Normal rate. Regular rhythm. Normal S1. " Normal S2.       Murmurs: There is no murmur.      No gallop.  No click. No rub.   Pulses:     Intact distal pulses.   Edema:     Peripheral edema absent.   Abdominal:      General: Bowel sounds are normal.      Palpations: Abdomen is soft.      Tenderness: There is no abdominal tenderness.   Musculoskeletal: Normal range of motion.         General: No tenderness. Skin:     General: Skin is warm and dry.   Neurological:      General: No focal deficit present.      Mental Status: Alert and oriented to person, place and time.        Diagnostic Data    ECG 12 Lead    Date/Time: 2/27/2025 12:47 PM  Performed by: Adiel Kang MD    Authorized by: Adiel Kang MD  Comparison: compared with previous ECG from 11/27/2024  Similar to previous ECG  Rhythm: sinus rhythm  Rate: normal  BPM: 69  QRS axis: normal  Other findings: non-specific ST-T wave changes    Clinical impression: abnormal EKG        Assessment and Plan  Diagnoses and all orders for this visit:    Autonomic orthostatic hypotension- symptomatic and BP drops 40 points here in the office again, she has stopped the  Northera,  , she is only on Fludrocortisone 0.1 daily     Essential hypertension- BP is 160.80 lying, will increase Losartan to 100 mg and will keep Toprol xl 25 mg    Hypercholesterolemia On Crestor     Paroxysmal atrial fibrillation- asymptomatic in sinus on flecainide 50 mg bid and also Toprol xl 25 mg., she wants to see a EP, will set her to see Dr Gilbert  -     Ambulatory Referral to Cardiac Electrophysiology    Other orders  -     losartan (COZAAR) 100 MG tablet; Take 1 tablet by mouth Daily.         Return in about 3 months (around 5/27/2025) for Recheck with Dr. Kang.    Adiel Kang MD  02/27/2025

## 2025-03-04 ENCOUNTER — OFFICE VISIT (OUTPATIENT)
Dept: FAMILY MEDICINE CLINIC | Facility: CLINIC | Age: 80
End: 2025-03-04
Payer: MEDICARE

## 2025-03-04 VITALS
OXYGEN SATURATION: 97 % | HEART RATE: 69 BPM | WEIGHT: 139.5 LBS | BODY MASS INDEX: 23.95 KG/M2 | SYSTOLIC BLOOD PRESSURE: 110 MMHG | DIASTOLIC BLOOD PRESSURE: 78 MMHG

## 2025-03-04 DIAGNOSIS — Z00.00 MEDICARE ANNUAL WELLNESS VISIT, SUBSEQUENT: Primary | ICD-10-CM

## 2025-03-04 DIAGNOSIS — M80.00XD AGE-RELATED OSTEOPOROSIS WITH CURRENT PATHOLOGICAL FRACTURE WITH ROUTINE HEALING: ICD-10-CM

## 2025-03-04 DIAGNOSIS — I48.0 PAROXYSMAL ATRIAL FIBRILLATION: ICD-10-CM

## 2025-03-04 DIAGNOSIS — Z00.00 ENCOUNTER FOR ROUTINE ADULT MEDICAL EXAMINATION: ICD-10-CM

## 2025-03-04 DIAGNOSIS — I10 ESSENTIAL HYPERTENSION: ICD-10-CM

## 2025-03-04 DIAGNOSIS — E78.00 HYPERCHOLESTEROLEMIA: ICD-10-CM

## 2025-03-04 DIAGNOSIS — J41.0 SIMPLE CHRONIC BRONCHITIS: ICD-10-CM

## 2025-03-04 DIAGNOSIS — G25.81 RESTLESS LEG SYNDROME: ICD-10-CM

## 2025-03-04 NOTE — PROGRESS NOTES
Subjective   The ABCs of the Annual Wellness Visit  Medicare Wellness Visit      Nesha Bacon is a 80 y.o. patient who presents for a Medicare Wellness Visit.    The following portions of the patient's history were reviewed and   updated as appropriate: allergies, current medications, past family history, past medical history, past social history, past surgical history, and problem list.    Compared to one year ago, the patient's physical   health is the same.  Compared to one year ago, the patient's mental   health is the same.    Recent Hospitalizations:  She was not admitted to the hospital during the last year.     Current Medical Providers:  Patient Care Team:  Julia Foote PA-C as PCP - General (Family Medicine)  Adiel Kang MD as Consulting Physician (Cardiology)  Nahid Abdalla PA (Physician Assistant)    Outpatient Medications Prior to Visit   Medication Sig Dispense Refill    calcium carbonate (OS-ROLAND) 1250 (500 Ca) MG tablet Take  by mouth Daily.      flecainide (TAMBOCOR) 50 MG tablet Take 1 tablet by mouth twice daily 180 tablet 0    fludrocortisone 0.1 MG tablet Take 1 tablet by mouth Daily. 90 tablet 3    losartan (COZAAR) 100 MG tablet Take 1 tablet by mouth Daily.      metoprolol succinate XL (TOPROL-XL) 25 MG 24 hr tablet Take 1 tablet by mouth Daily. 30 tablet 11    potassium chloride (MICRO-K) 10 MEQ CR capsule Take 1 capsule by mouth twice daily 60 capsule 5    pramipexole (MIRAPEX) 0.25 MG tablet TAKE 1 TABLET BY MOUTH ONCE DAILY AT NIGHT 90 tablet 0    rivaroxaban (XARELTO) 20 MG tablet Take 1 tablet by mouth Daily. 90 tablet 1    rosuvastatin (CRESTOR) 5 MG tablet Take 1 tablet by mouth Daily. 90 tablet 3    droxidopa (Northera) 300 MG capsule capsule Take 1 capsule by mouth 2 (Two) Times a Day. (Patient not taking: Reported on 3/4/2025) 180 capsule 2     No facility-administered medications prior to visit.     No opioid medication identified on active medication list.  "I have reviewed chart for other potential  high risk medication/s and harmful drug interactions in the elderly.      Aspirin is not on active medication list.  Aspirin use is not indicated based on review of current medical condition/s. Risk of harm outweighs potential benefits.  .    Patient Active Problem List   Diagnosis    Paroxysmal atrial fibrillation    Autonomic orthostatic hypotension    Simple chronic bronchitis    Vitamin B12 deficiency    Eczema    Encounter for routine adult medical examination    Restless leg syndrome    Encounter for subsequent annual wellness visit (AWV) in Medicare patient    Age-related osteoporosis with current pathological fracture with routine healing    Hypokalemia    Essential hypertension    Hypercholesterolemia    Intractable chronic post-traumatic headache    COPD (chronic obstructive pulmonary disease)    History of back surgery    History of partial nephrectomy    Renal cell carcinoma    Neck muscle spasm    Generalized anxiety disorder    Female bladder prolapse    Compression fracture of T4 vertebra    Medicare annual wellness visit, subsequent     Advance Care Planning Advance Directive is not on file.  ACP discussion was held with the patient during this visit. Patient does not have an advance directive, declines further assistance.            Objective   Vitals:    03/04/25 1121   BP: 110/78   Pulse: 69   SpO2: 97%   Weight: 63.3 kg (139 lb 8 oz)   PainSc: 5   Comment: back   PainLoc: Head       Estimated body mass index is 23.95 kg/m² as calculated from the following:    Height as of 2/27/25: 162.6 cm (64\").    Weight as of this encounter: 63.3 kg (139 lb 8 oz).    BMI is within normal parameters. No other follow-up for BMI required.           Does the patient have evidence of cognitive impairment? No  Lab Results   Component Value Date    CHLPL 204 (H) 03/04/2025    TRIG 133 03/04/2025    HDL 49 03/04/2025     (H) 03/04/2025    VLDL 24 03/04/2025             "                                                                                    Health  Risk Assessment    Smoking Status:  Social History     Tobacco Use   Smoking Status Never   Smokeless Tobacco Never     Alcohol Consumption:  Social History     Substance and Sexual Activity   Alcohol Use Never       Fall Risk Screen  STEADI Fall Risk Assessment was completed, and patient is at LOW risk for falls.Assessment completed on:3/4/2025    Depression Screening   Little interest or pleasure in doing things? Not at all   Feeling down, depressed, or hopeless? Not at all   PHQ-2 Total Score 0      Health Habits and Functional and Cognitive Screening:      3/4/2025    11:23 AM   Functional & Cognitive Status   Do you have difficulty preparing food and eating? No   Do you have difficulty bathing yourself, getting dressed or grooming yourself? No   Do you have difficulty using the toilet? No   Do you have difficulty moving around from place to place? No   Do you have trouble with steps or getting out of a bed or a chair? Yes   Current Diet Well Balanced Diet   Dental Exam Not up to date   Eye Exam Up to date   Exercise (times per week) 7 times per week   Current Exercises Include Stationary Bicycling/Spin Class   Do you need help using the phone?  No   Are you deaf or do you have serious difficulty hearing?  No   Do you need help to go to places out of walking distance? Yes   Do you need help shopping? No   Do you need help preparing meals?  No   Do you need help with housework?  No   Do you need help with laundry? No   Do you need help taking your medications? No   Do you need help managing money? No   Do you ever drive or ride in a car without wearing a seat belt? No   Have you felt unusual stress, anger or loneliness in the last month? No   Who do you live with? Alone   If you need help, do you have trouble finding someone available to you? No   Have you been bothered in the last four weeks by sexual problems? No   Do you  have difficulty concentrating, remembering or making decisions? No           Age-appropriate Screening Schedule:  Refer to the list below for future screening recommendations based on patient's age, sex and/or medical conditions. Orders for these recommended tests are listed in the plan section. The patient has been provided with a written plan.    Health Maintenance List  Health Maintenance   Topic Date Due    TDAP/TD VACCINES (1 - Tdap) Never done    ANNUAL WELLNESS VISIT  02/26/2025    INFLUENZA VACCINE  03/31/2025 (Originally 7/1/2024)    COVID-19 Vaccine (2 - 2024-25 season) 03/03/2026 (Originally 9/1/2024)    RSV Vaccine - Adults (1 - 1-dose 75+ series) 03/03/2026 (Originally 1/12/2020)    ZOSTER VACCINE (1 of 2) 03/03/2026 (Originally 1/12/1995)    DXA SCAN  11/13/2025    LIPID PANEL  03/04/2026    Pneumococcal Vaccine 50+  Completed                                                                                                                                                CMS Preventative Services Quick Reference  Risk Factors Identified During Encounter  Hearing Problem:  has hearing aids  Immunizations Discussed/Encouraged: Tdap and Prevnar 20 (Pneumococcal 20-valent conjugate)    The above risks/problems have been discussed with the patient.  Pertinent information has been shared with the patient in the After Visit Summary.  An After Visit Summary and PPPS were made available to the patient.    Follow Up:   Next Medicare Wellness visit to be scheduled in 1 year.         Additional E&M Note during same encounter follows:  Patient has additional, significant, and separately identifiable condition(s)/problem(s) that require work above and beyond the Medicare Wellness Visit     Chief Complaint  Medicare Wellness-subsequent    Subjective   HPI  Nesha is also being seen today for an annual adult preventative physical exam.     Review of Systems   Constitutional: Negative.  Negative for activity change, chills,  fatigue and fever.   HENT: Negative.  Negative for congestion, ear pain, postnasal drip and sinus pressure.    Eyes: Negative.    Respiratory: Negative.  Negative for cough, shortness of breath and wheezing.    Cardiovascular: Negative.  Negative for chest pain and palpitations.   Gastrointestinal: Negative.  Negative for constipation, diarrhea, nausea and vomiting.   Endocrine: Negative.    Genitourinary: Negative.    Skin: Negative.    Allergic/Immunologic: Negative.    Neurological: Negative.  Negative for dizziness, syncope, weakness, light-headedness and confusion.   Hematological: Negative.    Psychiatric/Behavioral: Negative.  Negative for sleep disturbance and suicidal ideas. The patient is not nervous/anxious.               Objective   Vital Signs:  /78   Pulse 69   Wt 63.3 kg (139 lb 8 oz)   SpO2 97%   BMI 23.95 kg/m²   Physical Exam  Vitals reviewed.   Constitutional:       Appearance: Normal appearance.   HENT:      Head: Normocephalic and atraumatic.      Right Ear: Tympanic membrane, ear canal and external ear normal.      Left Ear: Tympanic membrane, ear canal and external ear normal.      Nose: Nose normal.      Mouth/Throat:      Mouth: Mucous membranes are moist.   Eyes:      Extraocular Movements: Extraocular movements intact.      Pupils: Pupils are equal, round, and reactive to light.   Cardiovascular:      Rate and Rhythm: Normal rate and regular rhythm.      Pulses: Normal pulses.      Heart sounds: Normal heart sounds.   Pulmonary:      Effort: Pulmonary effort is normal.      Breath sounds: Normal breath sounds.   Abdominal:      General: Abdomen is flat. Bowel sounds are normal.      Palpations: Abdomen is soft.   Musculoskeletal:         General: Normal range of motion.      Cervical back: Normal range of motion and neck supple.   Skin:     General: Skin is warm and dry.   Neurological:      General: No focal deficit present.      Mental Status: She is alert and oriented to  person, place, and time.   Psychiatric:         Mood and Affect: Mood normal.         Behavior: Behavior normal.                 Assessment and Plan            Diagnoses and all orders for this visit:    1. Medicare annual wellness visit, subsequent (Primary)  Assessment & Plan:  Updated annual wellness visit checklist. Immunizations discussed. Screening up-to-date. Recommend yearly dental and eye exams. Also discussed monitoring of blood pressure and lipids. We addressed patient self-assessment of health status, frailty, and physical functioning. We reviewed psychosocial risks, behavioral risks, instrumental activities of daily living, and patient health risk assessment. Patient was given a personalized prevention plan.       2. Encounter for routine adult medical examination  Assessment & Plan:  Discussed injury prevention, diet and exercise, safe sexual practices, and screening for common diseases. Encouraged use of sunscreen and seatbelts. Encouraged SBE, avoidance of tobacco, limiting alcohol, and yearly dental and eye exams.       3. Essential hypertension  Assessment & Plan:  Hypertension is stable and controlled  Continue current treatment regimen.  Blood pressure will be reassessed in 6 months.    Orders:  -     Comprehensive Metabolic Panel; Future  -     CBC & Differential; Future  -     CBC & Differential  -     Comprehensive Metabolic Panel    4. Hypercholesterolemia  Assessment & Plan:   Lipid abnormalities are stable     Plan:  Continue same medication/s without change.       Discussed medication dosage, use, side effects, and goals of treatment in detail.    Counseled patient on lifestyle modifications to help control hyperlipidemia.      Patient Treatment Goals:   LDL goal is less than 70  LDL goal is under 130     Followup in 6 months.    Orders:  -     Comprehensive Metabolic Panel; Future  -     Lipid Panel; Future  -     Lipid Panel  -     Comprehensive Metabolic Panel    5. Paroxysmal atrial  fibrillation  Assessment & Plan:  This remains stable and followed by cardiology,       6. Age-related osteoporosis with current pathological fracture with routine healing  Assessment & Plan:  Current  treatment planned is prolia injections and continue calcium tablet.  Managed by Orthopedics at .       7. Restless leg syndrome  Assessment & Plan:  Continue present care no changes meds refilled.      8. Simple chronic bronchitis  Assessment & Plan:  Stable on no treatment.       Other orders  -     Pneumococcal Conjugate Vaccine 20-Valent All      Orders Placed This Encounter   Procedures    Pneumococcal Conjugate Vaccine 20-Valent All    Comprehensive Metabolic Panel     Standing Status:   Future     Number of Occurrences:   1     Standing Expiration Date:   3/4/2026     Order Specific Question:   Release to patient     Answer:   Routine Release [[id number]]    Lipid Panel     Standing Status:   Future     Number of Occurrences:   1     Standing Expiration Date:   3/4/2026     Order Specific Question:   Release to patient     Answer:   Routine Release [[id number]]    CBC & Differential     Standing Status:   Future     Number of Occurrences:   1     Standing Expiration Date:   3/4/2026     Order Specific Question:   Manual Differential     Answer:   No     Order Specific Question:   Release to patient     Answer:   Routine Release [[id number]]             Follow Up   Return in about 8 months (around 11/4/2025) for Establish with Dr. Hall.  Patient was given instructions and counseling regarding her condition or for health maintenance advice. Please see specific information pulled into the AVS if appropriate.    Julia Foote PA-C

## 2025-03-04 NOTE — LETTER
[facility]  Vaccine Consent Form    Patient Name:  Nesha Bacon  Patient :  1945     Vaccine(s) Ordered    Pneumococcal Conjugate Vaccine 20-Valent All        Screening Checklist  The following questions should be completed prior to vaccination. If you answer “yes” to any question, it does not necessarily mean you should not be vaccinated. It just means we may need to clarify or ask more questions. If a question is unclear, please ask your healthcare provider to explain it.    Yes No   Any fever or moderate to severe illness today (mild illness and/or antibiotic treatment are not contraindications)?     Do you have a history of a serious reaction to any previous vaccinations, such as anaphylaxis, encephalopathy within 7 days, Guillain-Ashaway syndrome within 6 weeks, seizure?     Have you received any live vaccine(s) (e.g MMR, VARGHESE) or any other vaccines in the last month (to ensure duplicate doses aren't given)?     Do you have an anaphylactic allergy to latex (DTaP, DTaP-IPV, Hep A, Hep B, MenB, RV, Td, Tdap), baker’s yeast (Hep B, HPV), polysorbates (RSV, nirsevimab, PCV 20, Rotavirrus, Tdap, Shingrix), or gelatin (VARGHESE, MMR)?     Do you have an anaphylactic allergy to neomycin (Rabies, VARGHESE, MMR, IPV, Hep A), polymyxin B (IPV), or streptomycin (IPV)?      Any cancer, leukemia, AIDS, or other immune system disorder? (VARGHESE, MMR, RV)     Do you have a parent, brother, or sister with an immune system problem (if immune competence of vaccine recipient clinically verified, can proceed)? (MMR, VARGHESE)     Any recent steroid treatments for >2 weeks, chemotherapy, or radiation treatment? (VARGHESE, MMR)     Have you received antibody-containing blood transfusions or IVIG in the past 11 months (recommended interval is dependent on product)? (MMR, VARGHESE)     Have you taken antiviral drugs (acyclovir, famciclovir, valacyclovir for VARGHESE) in the last 24 or 48 hours, respectively?      Are you pregnant or planning to become  "pregnant within 1 month? (VARGHESE, MMR, HPV, IPV, MenB, Abrexvy; For Hep B- refer to Engerix-B; For RSV - Abrysvo is indicated for 32-36 weeks of pregnancy from September to January)     For infants, have you ever been told your child has had intussusception or a medical emergency involving obstruction of the intestine (Rotavirus)? If not for an infant, can skip this question.         *Ordering Physicians/APC should be consulted if \"yes\" is checked by the patient or guardian above.  I have received, read, and understand the Vaccine Information Statement (VIS) for each vaccine ordered.  I have considered my or my child's health status as well as the health status of my close contacts.  I have taken the opportunity to discuss my vaccine questions with my or my child's health care provider.   I have requested that the ordered vaccine(s) be given to me or my child.  I understand the benefits and risks of the vaccines.  I understand that I should remain in the clinic for 15 minutes after receiving the vaccine(s).  _________________________________________________________  Signature of Patient or Parent/Legal Guardian ____________________  Date     "

## 2025-03-05 LAB
ALBUMIN SERPL-MCNC: 4.1 G/DL (ref 3.8–4.8)
ALP SERPL-CCNC: 98 IU/L (ref 44–121)
ALT SERPL-CCNC: 6 IU/L (ref 0–32)
AST SERPL-CCNC: 21 IU/L (ref 0–40)
BASOPHILS # BLD AUTO: 0 X10E3/UL (ref 0–0.2)
BASOPHILS NFR BLD AUTO: 1 %
BILIRUB SERPL-MCNC: 0.6 MG/DL (ref 0–1.2)
BUN SERPL-MCNC: 22 MG/DL (ref 8–27)
BUN/CREAT SERPL: 28 (ref 12–28)
CALCIUM SERPL-MCNC: 9.4 MG/DL (ref 8.7–10.3)
CHLORIDE SERPL-SCNC: 107 MMOL/L (ref 96–106)
CHOLEST SERPL-MCNC: 204 MG/DL (ref 100–199)
CO2 SERPL-SCNC: 22 MMOL/L (ref 20–29)
CREAT SERPL-MCNC: 0.78 MG/DL (ref 0.57–1)
EGFRCR SERPLBLD CKD-EPI 2021: 77 ML/MIN/1.73
EOSINOPHIL # BLD AUTO: 0 X10E3/UL (ref 0–0.4)
EOSINOPHIL NFR BLD AUTO: 1 %
ERYTHROCYTE [DISTWIDTH] IN BLOOD BY AUTOMATED COUNT: 12.2 % (ref 11.7–15.4)
GLOBULIN SER CALC-MCNC: 2.6 G/DL (ref 1.5–4.5)
GLUCOSE SERPL-MCNC: 68 MG/DL (ref 70–99)
HCT VFR BLD AUTO: 38.3 % (ref 34–46.6)
HDLC SERPL-MCNC: 49 MG/DL
HGB BLD-MCNC: 12.5 G/DL (ref 11.1–15.9)
IMM GRANULOCYTES # BLD AUTO: 0 X10E3/UL (ref 0–0.1)
IMM GRANULOCYTES NFR BLD AUTO: 0 %
LDLC SERPL CALC-MCNC: 131 MG/DL (ref 0–99)
LYMPHOCYTES # BLD AUTO: 1.5 X10E3/UL (ref 0.7–3.1)
LYMPHOCYTES NFR BLD AUTO: 35 %
MCH RBC QN AUTO: 32.1 PG (ref 26.6–33)
MCHC RBC AUTO-ENTMCNC: 32.6 G/DL (ref 31.5–35.7)
MCV RBC AUTO: 99 FL (ref 79–97)
MONOCYTES # BLD AUTO: 0.3 X10E3/UL (ref 0.1–0.9)
MONOCYTES NFR BLD AUTO: 7 %
NEUTROPHILS # BLD AUTO: 2.4 X10E3/UL (ref 1.4–7)
NEUTROPHILS NFR BLD AUTO: 56 %
PLATELET # BLD AUTO: 357 X10E3/UL (ref 150–450)
POTASSIUM SERPL-SCNC: 4 MMOL/L (ref 3.5–5.2)
PROT SERPL-MCNC: 6.7 G/DL (ref 6–8.5)
RBC # BLD AUTO: 3.89 X10E6/UL (ref 3.77–5.28)
SODIUM SERPL-SCNC: 142 MMOL/L (ref 134–144)
TRIGL SERPL-MCNC: 133 MG/DL (ref 0–149)
VLDLC SERPL CALC-MCNC: 24 MG/DL (ref 5–40)
WBC # BLD AUTO: 4.2 X10E3/UL (ref 3.4–10.8)

## 2025-03-06 PROBLEM — M81.0 AGE-RELATED OSTEOPOROSIS WITHOUT CURRENT PATHOLOGICAL FRACTURE: Status: ACTIVE | Noted: 2023-02-10

## 2025-03-06 PROBLEM — M80.00XD AGE-RELATED OSTEOPOROSIS WITH CURRENT PATHOLOGICAL FRACTURE WITH ROUTINE HEALING: Status: ACTIVE | Noted: 2023-02-10

## 2025-03-06 PROBLEM — D50.0 ANEMIA DUE TO GI BLOOD LOSS: Status: RESOLVED | Noted: 2024-11-27 | Resolved: 2025-03-06

## 2025-03-06 NOTE — ASSESSMENT & PLAN NOTE
Current  treatment planned is prolia injections and continue calcium tablet.  Managed by Orthopedics at .

## 2025-03-07 ENCOUNTER — TELEPHONE (OUTPATIENT)
Dept: FAMILY MEDICINE CLINIC | Facility: CLINIC | Age: 80
End: 2025-03-07
Payer: MEDICARE

## 2025-03-07 NOTE — TELEPHONE ENCOUNTER
----- Message from Julia Means sent at 3/6/2025  4:32 PM EST -----  Her complete blood count, cholesterol, blood sugar, liver and kidney functions were all normal.

## 2025-03-07 NOTE — TELEPHONE ENCOUNTER
Name: Bacon Nesha De La Cruz    Relationship: Self    Best Callback Number: [phone]    HUB PROVIDED THE RELAY MESSAGE FROM THE OFFICE   PATIENT HAS FURTHER QUESTIONS AND WOULD LIKE A CALL BACK    ADDITIONAL INFORMATION:

## 2025-03-07 NOTE — TELEPHONE ENCOUNTER
Name: Nesha Bacon      Relationship: Self      Best Callback Number: [phone]       HUB PROVIDED THE RELAY MESSAGE FROM THE OFFICE      PATIENT: VOICED UNDERSTANDING AND HAS NO FURTHER QUESTIONS AT THIS TIME    ADDITIONAL INFORMATION:

## 2025-03-07 NOTE — TELEPHONE ENCOUNTER
Attempted to call     Hub can relay     Her complete blood count, cholesterol, blood sugar, liver and kidney functions were all normal.

## 2025-03-12 RX ORDER — RIVAROXABAN 20 MG/1
20 TABLET, FILM COATED ORAL DAILY
Qty: 90 TABLET | Refills: 0 | Status: SHIPPED | OUTPATIENT
Start: 2025-03-12

## 2025-03-25 ENCOUNTER — OFFICE VISIT (OUTPATIENT)
Dept: CARDIOLOGY | Facility: CLINIC | Age: 80
End: 2025-03-25
Payer: MEDICARE

## 2025-03-25 VITALS
HEART RATE: 65 BPM | HEIGHT: 64 IN | SYSTOLIC BLOOD PRESSURE: 144 MMHG | OXYGEN SATURATION: 97 % | WEIGHT: 139 LBS | DIASTOLIC BLOOD PRESSURE: 74 MMHG | BODY MASS INDEX: 23.73 KG/M2

## 2025-03-25 DIAGNOSIS — I48.0 PAROXYSMAL ATRIAL FIBRILLATION: Primary | ICD-10-CM

## 2025-03-25 DIAGNOSIS — I10 ESSENTIAL HYPERTENSION: ICD-10-CM

## 2025-03-25 NOTE — PROGRESS NOTES
Cardiac Electrophysiology Outpatient Note  [facility] at [facility]    Office Visit     Nesha Bacon  [id number]  03/25/2025    Primary Care Physician: Julia Foote PA-C    Referred By: Adiel Kang, *    Subjective     Chief Complaint   Patient presents with    Atrial Fibrillation     Problem List:  Paroxysmal atrial fibrillation  TTE 10/2019: LVEF 55-60%, normal diastolic function, no VHD  48h monitor 10/2024: 49/70/109 bpm in sinus, 9 episodes of atrial tachycardia, no atrial fibrillation  Orthostasis  Hypertension  Dyslipidemia  COPD  History of renal cell carcinoma s/p partial nephrectomy      History of Present Illness:   Nesha Bacon is a 80 y.o. female who presents to my electrophysiology clinic to establish care with cardiac EP for history of atrial fibrillation on low-dose flecainide and beta-blockade.  She reports being diagnosed with atrial fibrillation in July 2021.  With atrial fibrillation she has palpitations as well as a heavy feeling in her chest.  She has been treated with flecainide but this continues to happen a few times per week.  She has had side effects with flecainide including headache and dizziness.  With this her dose has been decreased from 100 mg twice daily to 50 mg twice daily.  This is led to more atrial fibrillation.  Day-to-day she is able to do all her activities without issue.  Denies any syncopal events..      Past Medical History:   Diagnosis Date    Acute cystitis without hematuria     Age-related osteoporosis without current pathological fracture     Back pain     BMI 20.0-20.9, adult     Bone injury     BONE OR JOINT INJURIES    Chronic fatigue     Chronic obstructive pulmonary disease     Closed fracture of left hip, with routine healing, subsequent encounter     Lightheadedness     CHRONIC    Paroxysmal atrial fibrillation     Postoperative anemia     Pure hypercholesterolemia     Renal cancer     Shortness of breath  "       Past Surgical History:   Procedure Laterality Date    CATARACT EXTRACTION, BILATERAL      CHOLECYSTECTOMY      HIP FRACTURE SURGERY      LUMBAR LAMINECTOMY      NEPHRECTOMY PARTIAL      TUBAL ABDOMINAL LIGATION      VAGINAL HYSTERECTOMY SALPINGO OOPHORECTOMY WITH ANTERIOR AND POSTERIOR VAGINAL REPAIR         Family History   Problem Relation Age of Onset    Stroke Mother     Hypertension Mother     Coronary artery disease Brother        Social History     Socioeconomic History    Marital status: Single   Tobacco Use    Smoking status: Never    Smokeless tobacco: Never   Vaping Use    Vaping status: Never Used    Passive vaping exposure: Yes   Substance and Sexual Activity    Alcohol use: Never    Drug use: Never    Sexual activity: Not Currently         Current Outpatient Medications:     calcium carbonate (OS-ROLAND) 1250 (500 Ca) MG tablet, Take  by mouth Daily., Disp: , Rfl:     flecainide (TAMBOCOR) 50 MG tablet, Take 1 tablet by mouth twice daily, Disp: 180 tablet, Rfl: 0    fludrocortisone 0.1 MG tablet, Take 1 tablet by mouth Daily., Disp: 90 tablet, Rfl: 3    losartan (COZAAR) 100 MG tablet, Take 1 tablet by mouth Daily., Disp: , Rfl:     metoprolol succinate XL (TOPROL-XL) 25 MG 24 hr tablet, Take 1 tablet by mouth Daily., Disp: 30 tablet, Rfl: 11    potassium chloride (MICRO-K) 10 MEQ CR capsule, Take 1 capsule by mouth twice daily, Disp: 60 capsule, Rfl: 5    pramipexole (MIRAPEX) 0.25 MG tablet, TAKE 1 TABLET BY MOUTH ONCE DAILY AT NIGHT, Disp: 90 tablet, Rfl: 0    rosuvastatin (CRESTOR) 5 MG tablet, Take 1 tablet by mouth Daily., Disp: 90 tablet, Rfl: 3    Xarelto 20 MG tablet, Take 1 tablet by mouth once daily, Disp: 90 tablet, Rfl: 0    Allergies:   Allergies   Allergen Reactions    Aspirin Palpitations     Chest tightness    Latex Rash       Objective   Vital Signs: Blood pressure 144/74, pulse 65, height 162.6 cm (64\"), weight 63 kg (139 lb), SpO2 97%.    PHYSICAL EXAM  General appearance: " Awake, alert, cooperative  Head: Normocephalic, without obvious abnormality, atraumatic  Neck: No JVD  Lungs: Clear to ascultation bilaterally  Heart: Regular rate and rhythm, no murmurs, 2+ LE pulses, no lower extremity swelling  Skin: Skin color, turgor normal, no rashes or lesions  Neurologic: Grossly normal     Lab Results   Component Value Date    GLUCOSE 68 (L) 03/04/2025    CALCIUM 9.4 03/04/2025     03/04/2025    K 4.0 03/04/2025    CO2 22 03/04/2025     (H) 03/04/2025    BUN 22 03/04/2025    CREATININE 0.78 03/04/2025    BCR 28 03/04/2025     Lab Results   Component Value Date    WBC 4.2 03/04/2025    HGB 12.5 03/04/2025    HCT 38.3 03/04/2025    MCV 99 (H) 03/04/2025     03/04/2025     Lab Results   Component Value Date    INR 2.4 (A) 11/21/2023    INR 2.0 (A) 11/14/2023    INR 1.7 (A) 11/07/2023    PROTIME 2.4 11/21/2023    PROTIME 1.6 10/31/2023    PROTIME 13.8 09/25/2023     Lab Results   Component Value Date    TSH 1.480 02/26/2024          Results for orders placed in visit on 04/21/21    Intracardiac echocardiogram         I personally viewed and interpreted the patient's EKG/Telemetry/lab data    Procedures    Nesha Goddardughn  reports that she has never smoked. She has never used smokeless tobacco.      The ASCVD Risk score (Christopher DK, et al., 2019) failed to calculate for the following reasons:    The 2019 ASCVD risk score is only valid for ages 40 to 79    Advance Care Planning   Advance Care Planning: ACP discussion was held with the patient during this visit. Patient does not have an advance directive, information provided.     Assessment & Plan    1. Paroxysmal atrial fibrillation  Patient is referred for paroxysmal atrial fibrillation.  She continues to have episodes of atrial fibrillation despite flecainide.  She is also had side effects with flecainide including headache and dizziness.    We discussed the pathophysiology of atrial fibrillation as well as treatment  options going forward.  This included discussion of antiarrhythmic therapy as well as catheter ablation.  For the latter we discussed how the procedures performed including the likely of success and potential risks.  The patient is quite interested in proceeding with an ablation and we will work on scheduling this.  Continue Eliquis.  Stop flecainide 5 days prior to the ablation.    2. Essential hypertension  Blood pressure was borderline today in clinic.  Blood pressure has been somewhat hard due to coexisting orthostatic hypotension.  She is being followed by Dr. Kang.  Currently she is on losartan for hypertension, but fludrocortisone orthostatic hypotension.  Previously was on droxidopa as well but not taking this currently.  I recommended her to continue to check her blood pressures at home and report back to see if we need to adjust her therapy.    3.  Orthostatic hypotension  As above she also has significant orthostatic hypotension.  Continue fludrocortisone per Dr. Kang.    Follow Up:  Return for f/u after procedure.      Thank you for allowing me to participate in the care of your patient. Please do not hesitate to contact me with additional questions or concerns.      Cisco Gilbert M.D.  Cardiac Electrophysiologist  [facility] / [facility]

## 2025-04-18 ENCOUNTER — OUTSIDE FACILITY SERVICE (OUTPATIENT)
Dept: CARDIOLOGY | Facility: CLINIC | Age: 80
End: 2025-04-18
Payer: MEDICARE

## 2025-04-18 PROCEDURE — 99222 1ST HOSP IP/OBS MODERATE 55: CPT | Performed by: INTERNAL MEDICINE

## 2025-04-19 ENCOUNTER — OUTSIDE FACILITY SERVICE (OUTPATIENT)
Dept: CARDIOLOGY | Facility: CLINIC | Age: 80
End: 2025-04-19
Payer: MEDICARE

## 2025-04-19 PROCEDURE — 99232 SBSQ HOSP IP/OBS MODERATE 35: CPT | Performed by: INTERNAL MEDICINE

## 2025-04-23 ENCOUNTER — PREP FOR SURGERY (OUTPATIENT)
Dept: OTHER | Facility: HOSPITAL | Age: 80
End: 2025-04-23
Payer: MEDICARE

## 2025-04-23 DIAGNOSIS — I48.0 PAROXYSMAL ATRIAL FIBRILLATION: Primary | ICD-10-CM

## 2025-04-23 RX ORDER — SODIUM CHLORIDE 9 MG/ML
40 INJECTION, SOLUTION INTRAVENOUS AS NEEDED
OUTPATIENT
Start: 2025-04-23

## 2025-04-23 RX ORDER — ACETAMINOPHEN 325 MG/1
650 TABLET ORAL EVERY 4 HOURS PRN
OUTPATIENT
Start: 2025-04-23

## 2025-04-23 RX ORDER — ONDANSETRON 2 MG/ML
4 INJECTION INTRAMUSCULAR; INTRAVENOUS EVERY 6 HOURS PRN
OUTPATIENT
Start: 2025-04-23

## 2025-04-23 RX ORDER — NITROGLYCERIN 0.4 MG/1
0.4 TABLET SUBLINGUAL
OUTPATIENT
Start: 2025-04-23

## 2025-04-29 ENCOUNTER — OFFICE VISIT (OUTPATIENT)
Dept: CARDIOLOGY | Facility: CLINIC | Age: 80
End: 2025-04-29
Payer: MEDICARE

## 2025-04-29 VITALS
SYSTOLIC BLOOD PRESSURE: 125 MMHG | WEIGHT: 139 LBS | BODY MASS INDEX: 23.73 KG/M2 | HEIGHT: 64 IN | OXYGEN SATURATION: 99 % | DIASTOLIC BLOOD PRESSURE: 88 MMHG | HEART RATE: 80 BPM | TEMPERATURE: 98 F | RESPIRATION RATE: 18 BRPM

## 2025-04-29 DIAGNOSIS — I48.0 PAROXYSMAL ATRIAL FIBRILLATION: ICD-10-CM

## 2025-04-29 DIAGNOSIS — I10 ESSENTIAL HYPERTENSION: ICD-10-CM

## 2025-04-29 DIAGNOSIS — I95.1 AUTONOMIC ORTHOSTATIC HYPOTENSION: Primary | ICD-10-CM

## 2025-04-29 DIAGNOSIS — E78.00 HYPERCHOLESTEROLEMIA: ICD-10-CM

## 2025-04-29 PROCEDURE — 1160F RVW MEDS BY RX/DR IN RCRD: CPT | Performed by: INTERNAL MEDICINE

## 2025-04-29 PROCEDURE — 3074F SYST BP LT 130 MM HG: CPT | Performed by: INTERNAL MEDICINE

## 2025-04-29 PROCEDURE — 1159F MED LIST DOCD IN RCRD: CPT | Performed by: INTERNAL MEDICINE

## 2025-04-29 PROCEDURE — 93000 ELECTROCARDIOGRAM COMPLETE: CPT | Performed by: INTERNAL MEDICINE

## 2025-04-29 PROCEDURE — 99214 OFFICE O/P EST MOD 30 MIN: CPT | Performed by: INTERNAL MEDICINE

## 2025-04-29 PROCEDURE — 3078F DIAST BP <80 MM HG: CPT | Performed by: INTERNAL MEDICINE

## 2025-04-29 RX ORDER — DILTIAZEM HYDROCHLORIDE 180 MG/1
1 CAPSULE, COATED, EXTENDED RELEASE ORAL DAILY
COMMUNITY
Start: 2025-04-19

## 2025-04-29 RX ORDER — MAGNESIUM OXIDE TAB 400 MG (241.3 MG ELEMENTAL MG) 400 (241.3 MG) MG
TAB ORAL
COMMUNITY
Start: 2025-04-19

## 2025-04-29 NOTE — PROGRESS NOTES
[facility]  [facility]  [address] [place] [address]  Dept: [phone]  Dept Fax: [phone]    Nesha Bacon  1945    Follow Up Office Visit Note    History of Present Illness:  Nesha Bacon is a 80 y.o. female who presents to the clinic for Hospital Follow Up Visit. PAF- She was again, at the hospital with AF, went back to sinus on Cardizem, IV and then placed back on flecainide 50 mg bid Toprol xl 25mg plus Cardizem 180 mg, today EKG sinus Hr 54, advised to keep an eye with the HR, she is schedule for ablation next month    The following portions of the patient's history were reviewed and updated as appropriate: allergies, current medications, past family history, past medical history, past social history, past surgical history, and problem list.    Medications:  calcium carbonate  dilTIAZem CD  flecainide  fludrocortisone  MAGnesium-Oxide tablet  metoprolol succinate XL  potassium chloride  pramipexole  rosuvastatin  Xarelto tablet    Subjective  Allergies   Allergen Reactions    Aspirin Palpitations     Chest tightness    Latex Rash        Past Medical History:   Diagnosis Date    Acute cystitis without hematuria     Age-related osteoporosis without current pathological fracture     Back pain     BMI 20.0-20.9, adult     Bone injury     BONE OR JOINT INJURIES    Chronic fatigue     Chronic obstructive pulmonary disease     Closed fracture of left hip, with routine healing, subsequent encounter     Lightheadedness     CHRONIC    Paroxysmal atrial fibrillation     Postoperative anemia     Pure hypercholesterolemia     Renal cancer     Shortness of breath        Past Surgical History:   Procedure Laterality Date    CATARACT EXTRACTION, BILATERAL      CHOLECYSTECTOMY      HIP FRACTURE SURGERY      LUMBAR LAMINECTOMY      NEPHRECTOMY PARTIAL      TUBAL ABDOMINAL LIGATION      VAGINAL HYSTERECTOMY SALPINGO OOPHORECTOMY WITH ANTERIOR AND POSTERIOR  "VAGINAL REPAIR         Family History   Problem Relation Age of Onset    Stroke Mother     Hypertension Mother     Coronary artery disease Brother         Social History     Socioeconomic History    Marital status: Single   Tobacco Use    Smoking status: Never     Passive exposure: Never    Smokeless tobacco: Never   Vaping Use    Vaping status: Never Used    Passive vaping exposure: Yes   Substance and Sexual Activity    Alcohol use: Never    Drug use: Never    Sexual activity: Not Currently       Review of Systems   Constitutional: Negative.    HENT: Negative.     Respiratory: Negative.     Cardiovascular: Negative.    Endocrine: Negative.    Genitourinary: Negative.    Musculoskeletal: Negative.    Skin: Negative.    Allergic/Immunologic: Negative.    Neurological: Negative.    Hematological: Negative.    Psychiatric/Behavioral: Negative.         Cardiovascular Procedures    ECHO/MUGA:  STRESS TESTS:   CARDIAC CATH:   DEVICES:   HOLTER:   CT/MRI:   VASCULAR:   CARDIOTHORACIC:     Objective  Vitals:    04/29/25 1309 04/29/25 1311 04/29/25 1312 04/29/25 1314   BP: 138/63 114/62 116/85 125/88   BP Location: Right arm Right arm Right arm Right arm   Patient Position: Standing  Comment: 1 min dizzy Standing  Comment: 3 min dizzy Standing Standing  Comment: 5 min very dizzy   Cuff Size: Adult Adult Adult Adult   Pulse: 97 62 74 80   Resp: 18      Temp: 98 °F (36.7 °C)      TempSrc: Infrared      SpO2: 99%      Weight: 63 kg (139 lb)      Height: 162.6 cm (64\")      PainSc: 0-No pain        Body mass index is 23.86 kg/m².     Physical Exam  Vitals reviewed.   Constitutional:       Appearance: Healthy appearance. Not in distress.   Neck:      Vascular: No JVR. JVD normal.   Pulmonary:      Effort: Pulmonary effort is normal.      Breath sounds: Normal breath sounds. No wheezing. No rhonchi. No rales.   Chest:      Chest wall: Not tender to palpatation.   Cardiovascular:      PMI at left midclavicular line. Normal rate. " Regular rhythm. Normal S1. Normal S2.       Murmurs: There is no murmur.      No gallop.  No click. No rub.   Pulses:     Intact distal pulses.   Edema:     Peripheral edema absent.   Abdominal:      General: Bowel sounds are normal.      Palpations: Abdomen is soft.      Tenderness: There is no abdominal tenderness.   Musculoskeletal: Normal range of motion.         General: No tenderness. Skin:     General: Skin is warm and dry.   Neurological:      General: No focal deficit present.      Mental Status: Alert and oriented to person, place and time.        Diagnostic Data    ECG 12 Lead    Date/Time: 4/29/2025 1:40 PM  Performed by: Adiel Kang MD    Authorized by: Adiel Kang MD  Comparison: compared with previous ECG from 3/25/2025  Similar to previous ECG  Rhythm: sinus rhythm  Rate: normal  BPM: 54  QRS axis: normal    Clinical impression: normal ECG        Assessment and Plan  Diagnoses and all orders for this visit:    Autonomic orthostatic hypotension-- still dropping the BPon standing and dizzy all day, only on fludrocortisone, she quit [place]    Essential hypertension- BP is 120.80 on Toprol xl 25 mg and also Cardizem 180 mg, she was told to stop the Losartan at the hospital     Hypercholesterolemia- On crestor 5 mg    Paroxysmal atrial fibrillation- On flecainide 50 mg bid, and also xarelto 20 mg, has another epsiode and was at the hospital waiting for ablation    Other orders  -     dilTIAZem CD (CARDIZEM CD) 180 MG 24 hr capsule; Take 1 capsule by mouth Daily.  -     MAGnesium-Oxide 400 (240 Mg) MG tablet; TAKE 1 TABLET BY MOUTH ONCE DAILY FOR 15 DAYS         Return in about 3 months (around 7/29/2025) for Recheck with Dr. Kang.    Adiel Kang MD  04/29/2025

## 2025-05-08 NOTE — NURSING NOTE
PRE-PVA ASSESSMENT  Nesha Bacon 1945   [address] [place] [address]   [phone]      Referral Source:  Adiel Kang,   Information obtained from: [x] Medical record review  [x] Patient report  Scheduled for: PVA on 05/30/2025 with Dr. Gilbert  Allergies   Allergen Reactions    Aspirin Palpitations     Chest tightness    Latex Rash       AFib Specific History:    AFIBTYPE: paroxysmal    CHADS-VASc Risk Assessment               4 Total Score    1 Hypertension    2 Age >/= 75    1 Sex: Female    Criteria that do not apply:    CHF    DM    PRIOR STROKE/TIA/THROMBO    Vascular Disease    Age 65-74            Anticoagulation: Xarelto 20 mg daily, NO missed doses.   Cardioversion x 0  Failed AAD(s): flecainide   Prior Ablation: 0    Is Ms. Bacon aware of her AFib? Yes   Onset: 2021  Exacerbations: none   Frequency: every other month  Alleviations: none     Duration: days      Symptoms:   [x] Palpitations:    [x] Chest Discomfort:    [] Dizziness:    [] Presyncope:    [] Lightheadedness:   [] Syncope:    [] Fatigue:    [] Other:     [] Short of Breath:     Last Echo(s):  [x] TTE Date:     TTE 10/2019: LVEF 55-60%, normal diastolic function, no VHD      Past medical History:     [] Diabetes   NO             [] HYPOthyroidism  NO [] HYPERthyroidism NO               TSH   Date Value Ref Range Status   02/26/2024 1.480 0.450 - 4.500 uIU/mL Final   08/24/2022 2.400 0.450 - 4.500 uIU/mL Final       [x] HTN        [x] Tx: metoprolol, losartan    [] Heart Failure  NO  [] CVA    NO                          [] TIA NO      [] CAD         [] MI  NO          [x] Dyslipidemia  [x] Statin indicated: rosuvastatin     [] Ischemic Evaluation       [x] CT Coronary Angiogram 2021:     Narrative & Impression   EXAMINATION: CT ANGIOGRAM CORONARY-06/01/2021:      INDICATION: Chest pain/anginal equiv, 10yr CHD risk 10-20%, not  treadmill candidate; I20.8-Other forms of angina pectoris.      TECHNIQUE: CT  angiogram heart with 3-D imaging reconstructions performed  along with CT cardiac calcium score without intravenous contrast. 2-D  reconstructions as well as curved planar reformats performed at the  workstation. Patient had a normal R-R interval range in sinus rhythm.  Patient has no known allergies to contrast administration. The patient  was given 0.4 mg of sublingual nitroglycerin 5 minutes prior to the  dataset acquisition of the CTA heart and coronary circulation.      The radiation dose reduction device was turned on for each scan per the  ALARA (As Low as Reasonably Achievable) protocol.     COMPARISON: NONE.     FINDINGS:      Cardiac calcium scoring datasets performed detecting 0 distinct  calcified plaque lesions placing patient in the no identifiable  calcification category. Cardiac chambers appear to have mild left  ventricular hypertrophy and enlargement without pericardial effusion. No  valvular calcifications are noted. No mediastinal mass within the  adjacent mediastinum or abnormalities of the adjacent lung parenchyma on  widened lung windows.     Coronary CTA datasets were performed and demonstrate normal caliber of  the ascending thoracic aorta with patent great vessel origins.  Descending thoracic aorta unremarkable without atherosclerotic  involvement or abnormal stenosis or irregularity. Central pulmonary  grossly patent without pulmonary embolus identified centrally. Left  atrial appendage well-opacified without thrombus burden. No anomalous  pulmonary venous return. No evidence for coronary origin anomaly. No  evidence for abnormality of the sinuses of Valsalva. Right coronary CTA  without coronary plaque or stenosis and patent caliber with patient  noted to be right coronary dominant supplying the posterior descending  artery. Left main, LAD and circumflex within normal limits without  plaque or stenosis demonstrated in patency throughout no atherosclerotic  calcific burden or irregularity  of contour.     IMPRESSION:  1.  Calcium score datasets detected 0 distinct calcified Plaque lesions  placing patient in the no identifiable calcification category with a  calcium score of 0.0.     [] Sleep Apnea Suspected NO    [] Obesity NO    Other Pertinent PMH:  Hold flec 5 days, last dose on May 24, 2025.    Summary of Patient Contact:    I spoke with Ms. Bacon about her upcoming PVA.   She was well informed about the procedure from prior discussion with Dr. Gilbert and from reading the provided literature.  We discussed the procedure at length including risks, anesthesia, intra-op procedures, recovery, bedrest, normal post-procedure expectations, and success rates.  I answered a few remaining questions. Ms. Bacon verbalized understanding and she is ready to proceed.

## 2025-05-14 RX ORDER — PRAMIPEXOLE DIHYDROCHLORIDE 0.25 MG/1
0.25 TABLET ORAL NIGHTLY
Qty: 90 TABLET | Refills: 0 | Status: SHIPPED | OUTPATIENT
Start: 2025-05-14

## 2025-05-23 ENCOUNTER — HOSPITAL ENCOUNTER (OUTPATIENT)
Dept: CT IMAGING | Facility: HOSPITAL | Age: 80
Discharge: HOME | End: 2025-05-23
Payer: MEDICARE

## 2025-05-23 ENCOUNTER — PRE-ADMISSION TESTING (OUTPATIENT)
Dept: PREADMISSION TESTING | Facility: HOSPITAL | Age: 80
End: 2025-05-23
Payer: MEDICARE

## 2025-05-23 DIAGNOSIS — I48.0 PAROXYSMAL ATRIAL FIBRILLATION: ICD-10-CM

## 2025-05-23 LAB
ANION GAP SERPL CALCULATED.3IONS-SCNC: 12 MMOL/L (ref 5–15)
BUN SERPL-MCNC: 16 MG/DL (ref 8–23)
BUN/CREAT SERPL: 21.1 (ref 7–25)
CALCIUM SPEC-SCNC: 10.1 MG/DL (ref 8.6–10.5)
CHLORIDE SERPL-SCNC: 103 MMOL/L (ref 98–107)
CO2 SERPL-SCNC: 26 MMOL/L (ref 22–29)
CREAT SERPL-MCNC: 0.76 MG/DL (ref 0.57–1)
DEPRECATED RDW RBC AUTO: 47.5 FL (ref 37–54)
EGFRCR SERPLBLD CKD-EPI 2021: 79.3 ML/MIN/1.73
ERYTHROCYTE [DISTWIDTH] IN BLOOD BY AUTOMATED COUNT: 13.2 % (ref 12.3–15.4)
GLUCOSE SERPL-MCNC: 81 MG/DL (ref 65–99)
HCT VFR BLD AUTO: 39.5 % (ref 34–46.6)
HGB BLD-MCNC: 12.5 G/DL (ref 12–15.9)
MCH RBC QN AUTO: 31.2 PG (ref 26.6–33)
MCHC RBC AUTO-ENTMCNC: 31.6 G/DL (ref 31.5–35.7)
MCV RBC AUTO: 98.5 FL (ref 79–97)
PLATELET # BLD AUTO: 315 10*3/MM3 (ref 140–450)
PMV BLD AUTO: 8.9 FL (ref 6–12)
POTASSIUM SERPL-SCNC: 4 MMOL/L (ref 3.5–5.2)
RBC # BLD AUTO: 4.01 10*6/MM3 (ref 3.77–5.28)
SODIUM SERPL-SCNC: 141 MMOL/L (ref 136–145)
WBC NRBC COR # BLD AUTO: 6.24 10*3/MM3 (ref 3.4–10.8)

## 2025-05-23 PROCEDURE — 71275 CT ANGIOGRAPHY CHEST: CPT

## 2025-05-23 PROCEDURE — 36415 COLL VENOUS BLD VENIPUNCTURE: CPT

## 2025-05-23 PROCEDURE — 25510000001 IOPAMIDOL PER 1 ML: Performed by: STUDENT IN AN ORGANIZED HEALTH CARE EDUCATION/TRAINING PROGRAM

## 2025-05-23 PROCEDURE — 85027 COMPLETE CBC AUTOMATED: CPT

## 2025-05-23 PROCEDURE — 80048 BASIC METABOLIC PNL TOTAL CA: CPT

## 2025-05-23 RX ORDER — IOPAMIDOL 755 MG/ML
100 INJECTION, SOLUTION INTRAVASCULAR
Status: COMPLETED | OUTPATIENT
Start: 2025-05-23 | End: 2025-05-23

## 2025-05-23 RX ADMIN — IOPAMIDOL 80 ML: 755 INJECTION, SOLUTION INTRAVENOUS at 13:47

## 2025-05-23 NOTE — DISCHARGE INSTRUCTIONS
Dear Patient,    Do NOT eat, drink, or smoke after midnight the night before your procedure.   Take your medications as instructed by your doctor.    Glasses and jewelry may be worn, but dentures must be removed prior to your procedure.    Leave any items you consider valuable at home.      MORNING of your Procedure, please bring the following:     -Photo ID and insurance card(s)    -ALL medications in their ORIGINAL CONTAINERS or current medication list.    -Co-pay and/or deductible required by your insurance   -Copy of living will or power of  document (if not brought to Pre-Admission Testing department)   -CPAP mask and tubing, not your machine (if applicable)   -Relaxation aids (music, books, magazines)   -Skin Prep Instruction Sheet (if applicable)       Check in is on the 2nd floor of the [address] building.  Your procedure will be performed in the cath lab or EP lab.  During your procedure, your family will wait in the cath lab waiting area where you checked in.      Please make arrangements for transportation home prior to discharge time.      Please note:  If you are scheduled to have one of the following procedures: Pulmonary Vein Ablation, Lead Extraction, MitraClip, Cerebral Coilings or Embolization, please let your family know that after your procedure you will be going to recovery unit on the 2nd floor of the [address] building.  When the physician is finished speaking with your family after your procedure is completed, your family will be directed or escorted to the surgery waiting area in the [facility].  This is where your family will wait until you are given a room assignment and then your family will be directed to the appropriate unit.

## 2025-05-29 ENCOUNTER — ANESTHESIA EVENT (OUTPATIENT)
Dept: CARDIOLOGY | Facility: HOSPITAL | Age: 80
End: 2025-05-29
Payer: MEDICARE

## 2025-05-29 ENCOUNTER — TELEPHONE (OUTPATIENT)
Dept: CARDIOLOGY | Facility: CLINIC | Age: 80
End: 2025-05-29
Payer: MEDICARE

## 2025-05-29 NOTE — H&P
"  Patient Care Team:  Julia Foote PA-C as PCP - General (Family Medicine)  Adiel Kang MD as Consulting Physician (Cardiology)  Nahid Abdalla PA (Physician Assistant)  Cisco Gilbert MD as Consulting Physician (Cardiology)    Chief complaint Atrial fibrillation     Problem List:  Paroxysmal atrial fibrillation  TTE 10/2019: LVEF 55-60%, normal diastolic function, no VHD  48h monitor 10/2024: 49/70/109 bpm in sinus, 9 episodes of atrial tachycardia, no atrial fibrillation  Orthostasis  Hypertension  Dyslipidemia  COPD  History of renal cell carcinoma s/p partial nephrectomy      Subjective     History of Present Illness  The patient is an 80 year old female with a history of paroxysmal atrial fibrillation.  She presents today to undergo pulmonary vein ablation by Dr. Gilbert she was first diagnosed in July 2021.  She has been maintained on flecainide as well as beta-blocker therapy for arrhythmia suppression she is chronically anticoagulated with Xarelto 20 mg p.o. daily.  Patient states that she continues to have episodes of palpitations, headache, dizziness, \"feeling in my chest\" that occurs a few times weekly.  Despite increasing the dose of her flecainide therapy this is not improved her symptoms.      Past Medical History:   Diagnosis Date    Acute cystitis without hematuria     Age-related osteoporosis without current pathological fracture     Arthritis     Back pain     BMI 20.0-20.9, adult     Bone injury     BONE OR JOINT INJURIES    Chronic fatigue     Chronic obstructive pulmonary disease     Closed fracture of left hip, with routine healing, subsequent encounter     Elevated cholesterol     Hypertension     Lightheadedness     CHRONIC    Paroxysmal atrial fibrillation     PONV (postoperative nausea and vomiting)     Postoperative anemia     Pure hypercholesterolemia     Renal cancer     Shortness of breath      Past Surgical History:   Procedure Laterality Date    CATARACT EXTRACTION, " BILATERAL      CHOLECYSTECTOMY      COLONOSCOPY      HIP FRACTURE SURGERY Left     LUMBAR LAMINECTOMY      NECK SURGERY      lala in place    NEPHRECTOMY PARTIAL Right     TUBAL ABDOMINAL LIGATION      VAGINAL HYSTERECTOMY SALPINGO OOPHORECTOMY WITH ANTERIOR AND POSTERIOR VAGINAL REPAIR       Family History   Problem Relation Age of Onset    Stroke Mother     Hypertension Mother     Coronary artery disease Brother      Social History     Tobacco Use    Smoking status: Never     Passive exposure: Never    Smokeless tobacco: Never   Vaping Use    Vaping status: Never Used    Passive vaping exposure: Yes   Substance Use Topics    Alcohol use: Never    Drug use: Never       Objective      Vital Signs  Temp:  [97 °F (36.1 °C)] 97 °F (36.1 °C)  Heart Rate:  [75] 75  Resp:  [14] 14  BP: (125-126)/(77-78) 126/77    Physical Exam  Vitals reviewed.   Constitutional:       Appearance: Normal appearance. She is normal weight.   HENT:      Head: Normocephalic and atraumatic.      Mouth/Throat:      Mouth: Mucous membranes are dry.      Pharynx: Oropharynx is clear.   Eyes:      Conjunctiva/sclera: Conjunctivae normal.      Pupils: Pupils are equal, round, and reactive to light.   Cardiovascular:      Rate and Rhythm: Normal rate and regular rhythm.      Pulses: Normal pulses.      Heart sounds: Normal heart sounds. No murmur heard.  Pulmonary:      Effort: Pulmonary effort is normal.      Breath sounds: Normal breath sounds.   Abdominal:      General: Abdomen is flat. Bowel sounds are normal.   Musculoskeletal:      Right lower leg: No edema.      Left lower leg: No edema.   Skin:     General: Skin is warm and dry.   Neurological:      General: No focal deficit present.      Mental Status: She is alert and oriented to person, place, and time. Mental status is at baseline.   Psychiatric:         Mood and Affect: Mood normal.         Behavior: Behavior normal.         Thought Content: Thought content normal.         Results Review:  "   I reviewed the patient's new clinical results.      Assessment & Plan   Paroxysmal atrial fibrillation   The patient is an 80 year old female with a history of paroxysmal atrial fibrillation.  She presents today to undergo pulmonary vein ablation by Dr. Gilbert she was first diagnosed in July 2021.  She has been maintained on flecainide as well as beta-blocker therapy for arrhythmia suppression she is chronically anticoagulated with Xarelto 20 mg p.o. daily.  Patient states that she continues to have episodes of palpitations, headache, dizziness, \"feeling in my chest\" that occurs a few times weekly.  Despite increasing the dose of her flecainide therapy this is not improved her symptoms.    Her last dose of Xarelto was last evening.  She has not missed doses nor had interruption in therapy.  She stopped her flecainide 5 days ago.    Post procedure the patient will remain on Xarelto 20mg oral daily uninterrupted  Patient will [facility] in one month  Patient will follow up with Dr. Gilbert in three months in [place], [place]       MIRIAM Martin  Electrophysiology  [facility] / [facility]     "

## 2025-05-30 ENCOUNTER — HOSPITAL ENCOUNTER (OUTPATIENT)
Facility: HOSPITAL | Age: 80
Setting detail: HOSPITAL OUTPATIENT SURGERY
Discharge: HOME | End: 2025-05-30
Attending: STUDENT IN AN ORGANIZED HEALTH CARE EDUCATION/TRAINING PROGRAM | Admitting: STUDENT IN AN ORGANIZED HEALTH CARE EDUCATION/TRAINING PROGRAM
Payer: MEDICARE

## 2025-05-30 ENCOUNTER — ANESTHESIA (OUTPATIENT)
Dept: CARDIOLOGY | Facility: HOSPITAL | Age: 80
End: 2025-05-30
Payer: MEDICARE

## 2025-05-30 VITALS
DIASTOLIC BLOOD PRESSURE: 78 MMHG | RESPIRATION RATE: 19 BRPM | TEMPERATURE: 97 F | HEART RATE: 91 BPM | WEIGHT: 138.23 LBS | OXYGEN SATURATION: 94 % | SYSTOLIC BLOOD PRESSURE: 117 MMHG | BODY MASS INDEX: 23.6 KG/M2 | HEIGHT: 64 IN

## 2025-05-30 DIAGNOSIS — I48.0 PAROXYSMAL ATRIAL FIBRILLATION: ICD-10-CM

## 2025-05-30 LAB
ACT BLD: 302 SECONDS (ref 82–152)
ACT BLD: 412 SECONDS (ref 82–152)
ACT BLD: 423 SECONDS (ref 82–152)
ACT BLD: 452 SECONDS (ref 82–152)

## 2025-05-30 PROCEDURE — C1894 INTRO/SHEATH, NON-LASER: HCPCS | Performed by: STUDENT IN AN ORGANIZED HEALTH CARE EDUCATION/TRAINING PROGRAM

## 2025-05-30 PROCEDURE — 25010000002 SUGAMMADEX 200 MG/2ML SOLUTION: Performed by: NURSE ANESTHETIST, CERTIFIED REGISTERED

## 2025-05-30 PROCEDURE — 25810000003 SODIUM CHLORIDE 0.9 % SOLUTION: Performed by: NURSE ANESTHETIST, CERTIFIED REGISTERED

## 2025-05-30 PROCEDURE — 93655 ICAR CATH ABLTJ DSCRT ARRHYT: CPT | Performed by: STUDENT IN AN ORGANIZED HEALTH CARE EDUCATION/TRAINING PROGRAM

## 2025-05-30 PROCEDURE — C1732 CATH, EP, DIAG/ABL, 3D/VECT: HCPCS | Performed by: STUDENT IN AN ORGANIZED HEALTH CARE EDUCATION/TRAINING PROGRAM

## 2025-05-30 PROCEDURE — C1733 CATH, EP, OTHR THAN COOL-TIP: HCPCS | Performed by: STUDENT IN AN ORGANIZED HEALTH CARE EDUCATION/TRAINING PROGRAM

## 2025-05-30 PROCEDURE — 25010000002 DEXAMETHASONE PER 1 MG: Performed by: NURSE ANESTHETIST, CERTIFIED REGISTERED

## 2025-05-30 PROCEDURE — C1766 INTRO/SHEATH,STRBLE,NON-PEEL: HCPCS | Performed by: STUDENT IN AN ORGANIZED HEALTH CARE EDUCATION/TRAINING PROGRAM

## 2025-05-30 PROCEDURE — 93622 COMP EP EVAL L VENTR PAC&REC: CPT | Performed by: STUDENT IN AN ORGANIZED HEALTH CARE EDUCATION/TRAINING PROGRAM

## 2025-05-30 PROCEDURE — 93656 COMPRE EP EVAL ABLTJ ATR FIB: CPT | Performed by: STUDENT IN AN ORGANIZED HEALTH CARE EDUCATION/TRAINING PROGRAM

## 2025-05-30 PROCEDURE — 25010000002 PROPOFOL 10 MG/ML EMULSION: Performed by: NURSE ANESTHETIST, CERTIFIED REGISTERED

## 2025-05-30 PROCEDURE — 85347 COAGULATION TIME ACTIVATED: CPT

## 2025-05-30 PROCEDURE — 25010000002 LIDOCAINE PF 1% 1 % SOLUTION: Performed by: NURSE ANESTHETIST, CERTIFIED REGISTERED

## 2025-05-30 PROCEDURE — C1760 CLOSURE DEV, VASC: HCPCS | Performed by: STUDENT IN AN ORGANIZED HEALTH CARE EDUCATION/TRAINING PROGRAM

## 2025-05-30 PROCEDURE — C1769 GUIDE WIRE: HCPCS | Performed by: STUDENT IN AN ORGANIZED HEALTH CARE EDUCATION/TRAINING PROGRAM

## 2025-05-30 PROCEDURE — 93657 TX L/R ATRIAL FIB ADDL: CPT | Performed by: STUDENT IN AN ORGANIZED HEALTH CARE EDUCATION/TRAINING PROGRAM

## 2025-05-30 PROCEDURE — 25010000002 PHENYLEPHRINE 10 MG/ML SOLUTION 1 ML VIAL: Performed by: NURSE ANESTHETIST, CERTIFIED REGISTERED

## 2025-05-30 PROCEDURE — 25010000002 PROTAMINE SULFATE PER 10 MG: Performed by: STUDENT IN AN ORGANIZED HEALTH CARE EDUCATION/TRAINING PROGRAM

## 2025-05-30 PROCEDURE — 25010000002 HEPARIN (PORCINE) PER 1000 UNITS: Performed by: STUDENT IN AN ORGANIZED HEALTH CARE EDUCATION/TRAINING PROGRAM

## 2025-05-30 PROCEDURE — 25010000002 FAMOTIDINE 10 MG/ML SOLUTION: Performed by: ANESTHESIOLOGY

## 2025-05-30 PROCEDURE — C1759 CATH, INTRA ECHOCARDIOGRAPHY: HCPCS | Performed by: STUDENT IN AN ORGANIZED HEALTH CARE EDUCATION/TRAINING PROGRAM

## 2025-05-30 PROCEDURE — 25010000002 LIDOCAINE 1 % SOLUTION: Performed by: STUDENT IN AN ORGANIZED HEALTH CARE EDUCATION/TRAINING PROGRAM

## 2025-05-30 PROCEDURE — 25010000002 ONDANSETRON PER 1 MG: Performed by: NURSE ANESTHETIST, CERTIFIED REGISTERED

## 2025-05-30 PROCEDURE — C1730 CATH, EP, 19 OR FEW ELECT: HCPCS | Performed by: STUDENT IN AN ORGANIZED HEALTH CARE EDUCATION/TRAINING PROGRAM

## 2025-05-30 RX ORDER — ROCURONIUM BROMIDE 10 MG/ML
INJECTION, SOLUTION INTRAVENOUS AS NEEDED
Status: DISCONTINUED | OUTPATIENT
Start: 2025-05-30 | End: 2025-05-30 | Stop reason: SURG

## 2025-05-30 RX ORDER — SODIUM CHLORIDE 9 MG/ML
INJECTION, SOLUTION INTRAVENOUS CONTINUOUS PRN
Status: DISCONTINUED | OUTPATIENT
Start: 2025-05-30 | End: 2025-05-30 | Stop reason: SURG

## 2025-05-30 RX ORDER — SODIUM CHLORIDE 0.9 % (FLUSH) 0.9 %
10 SYRINGE (ML) INJECTION EVERY 12 HOURS SCHEDULED
Status: DISCONTINUED | OUTPATIENT
Start: 2025-05-30 | End: 2025-05-30 | Stop reason: HOSPADM

## 2025-05-30 RX ORDER — FENTANYL CITRATE 50 UG/ML
50 INJECTION, SOLUTION INTRAMUSCULAR; INTRAVENOUS
Status: DISCONTINUED | OUTPATIENT
Start: 2025-05-30 | End: 2025-05-30 | Stop reason: HOSPADM

## 2025-05-30 RX ORDER — SODIUM CHLORIDE 9 MG/ML
40 INJECTION, SOLUTION INTRAVENOUS AS NEEDED
Status: DISCONTINUED | OUTPATIENT
Start: 2025-05-30 | End: 2025-05-30 | Stop reason: HOSPADM

## 2025-05-30 RX ORDER — ACETAMINOPHEN 650 MG/1
650 SUPPOSITORY RECTAL EVERY 4 HOURS PRN
Status: DISCONTINUED | OUTPATIENT
Start: 2025-05-30 | End: 2025-05-30 | Stop reason: HOSPADM

## 2025-05-30 RX ORDER — LIDOCAINE HYDROCHLORIDE 10 MG/ML
INJECTION, SOLUTION INFILTRATION; PERINEURAL
Status: DISCONTINUED | OUTPATIENT
Start: 2025-05-30 | End: 2025-05-30 | Stop reason: HOSPADM

## 2025-05-30 RX ORDER — SODIUM CHLORIDE, SODIUM LACTATE, POTASSIUM CHLORIDE, CALCIUM CHLORIDE 600; 310; 30; 20 MG/100ML; MG/100ML; MG/100ML; MG/100ML
9 INJECTION, SOLUTION INTRAVENOUS CONTINUOUS
Status: DISCONTINUED | OUTPATIENT
Start: 2025-05-31 | End: 2025-05-30 | Stop reason: HOSPADM

## 2025-05-30 RX ORDER — PROPOFOL 10 MG/ML
VIAL (ML) INTRAVENOUS AS NEEDED
Status: DISCONTINUED | OUTPATIENT
Start: 2025-05-30 | End: 2025-05-30 | Stop reason: SURG

## 2025-05-30 RX ORDER — SODIUM CHLORIDE 0.9 % (FLUSH) 0.9 %
3 SYRINGE (ML) INJECTION EVERY 12 HOURS SCHEDULED
Status: DISCONTINUED | OUTPATIENT
Start: 2025-05-30 | End: 2025-05-30 | Stop reason: HOSPADM

## 2025-05-30 RX ORDER — LIDOCAINE HYDROCHLORIDE 10 MG/ML
0.5 INJECTION, SOLUTION EPIDURAL; INFILTRATION; INTRACAUDAL; PERINEURAL ONCE AS NEEDED
Status: DISCONTINUED | OUTPATIENT
Start: 2025-05-30 | End: 2025-05-30 | Stop reason: HOSPADM

## 2025-05-30 RX ORDER — FAMOTIDINE 10 MG/ML
20 INJECTION, SOLUTION INTRAVENOUS ONCE
Status: COMPLETED | OUTPATIENT
Start: 2025-05-30 | End: 2025-05-30

## 2025-05-30 RX ORDER — HYDROMORPHONE HYDROCHLORIDE 1 MG/ML
0.5 INJECTION, SOLUTION INTRAMUSCULAR; INTRAVENOUS; SUBCUTANEOUS
Status: DISCONTINUED | OUTPATIENT
Start: 2025-05-30 | End: 2025-05-30 | Stop reason: HOSPADM

## 2025-05-30 RX ORDER — SODIUM CHLORIDE 0.9 % (FLUSH) 0.9 %
10 SYRINGE (ML) INJECTION AS NEEDED
Status: DISCONTINUED | OUTPATIENT
Start: 2025-05-30 | End: 2025-05-30 | Stop reason: HOSPADM

## 2025-05-30 RX ORDER — DEXAMETHASONE SODIUM PHOSPHATE 4 MG/ML
INJECTION, SOLUTION INTRA-ARTICULAR; INTRALESIONAL; INTRAMUSCULAR; INTRAVENOUS; SOFT TISSUE AS NEEDED
Status: DISCONTINUED | OUTPATIENT
Start: 2025-05-30 | End: 2025-05-30 | Stop reason: SURG

## 2025-05-30 RX ORDER — ONDANSETRON 2 MG/ML
4 INJECTION INTRAMUSCULAR; INTRAVENOUS EVERY 6 HOURS PRN
Status: DISCONTINUED | OUTPATIENT
Start: 2025-05-30 | End: 2025-05-30 | Stop reason: HOSPADM

## 2025-05-30 RX ORDER — FAMOTIDINE 20 MG/1
20 TABLET, FILM COATED ORAL ONCE
Status: DISCONTINUED | OUTPATIENT
Start: 2025-05-30 | End: 2025-05-30 | Stop reason: HOSPADM

## 2025-05-30 RX ORDER — HEPARIN SODIUM 1000 [USP'U]/ML
INJECTION, SOLUTION INTRAVENOUS; SUBCUTANEOUS
Status: DISCONTINUED | OUTPATIENT
Start: 2025-05-30 | End: 2025-05-30 | Stop reason: HOSPADM

## 2025-05-30 RX ORDER — ONDANSETRON 2 MG/ML
INJECTION INTRAMUSCULAR; INTRAVENOUS AS NEEDED
Status: DISCONTINUED | OUTPATIENT
Start: 2025-05-30 | End: 2025-05-30 | Stop reason: SURG

## 2025-05-30 RX ORDER — HEPARIN SODIUM 10000 [USP'U]/100ML
INJECTION, SOLUTION INTRAVENOUS
Status: DISCONTINUED | OUTPATIENT
Start: 2025-05-30 | End: 2025-05-30 | Stop reason: HOSPADM

## 2025-05-30 RX ORDER — NITROGLYCERIN 0.4 MG/1
0.4 TABLET SUBLINGUAL
Status: DISCONTINUED | OUTPATIENT
Start: 2025-05-30 | End: 2025-05-30 | Stop reason: HOSPADM

## 2025-05-30 RX ORDER — ONDANSETRON 2 MG/ML
4 INJECTION INTRAMUSCULAR; INTRAVENOUS ONCE AS NEEDED
Status: DISCONTINUED | OUTPATIENT
Start: 2025-05-30 | End: 2025-05-30 | Stop reason: SDUPTHER

## 2025-05-30 RX ORDER — LIDOCAINE HYDROCHLORIDE 10 MG/ML
INJECTION, SOLUTION EPIDURAL; INFILTRATION; INTRACAUDAL; PERINEURAL AS NEEDED
Status: DISCONTINUED | OUTPATIENT
Start: 2025-05-30 | End: 2025-05-30 | Stop reason: SURG

## 2025-05-30 RX ORDER — PROTAMINE SULFATE 10 MG/ML
INJECTION, SOLUTION INTRAVENOUS
Status: DISCONTINUED | OUTPATIENT
Start: 2025-05-30 | End: 2025-05-30 | Stop reason: HOSPADM

## 2025-05-30 RX ORDER — ACETAMINOPHEN 325 MG/1
650 TABLET ORAL EVERY 4 HOURS PRN
Status: DISCONTINUED | OUTPATIENT
Start: 2025-05-30 | End: 2025-05-30 | Stop reason: HOSPADM

## 2025-05-30 RX ORDER — MIDAZOLAM HYDROCHLORIDE 1 MG/ML
0.5 INJECTION, SOLUTION INTRAMUSCULAR; INTRAVENOUS
Status: DISCONTINUED | OUTPATIENT
Start: 2025-05-30 | End: 2025-05-30 | Stop reason: HOSPADM

## 2025-05-30 RX ADMIN — PROPOFOL 100 MG: 10 INJECTION, EMULSION INTRAVENOUS at 07:36

## 2025-05-30 RX ADMIN — LIDOCAINE HYDROCHLORIDE 50 MG: 10 INJECTION, SOLUTION EPIDURAL; INFILTRATION; INTRACAUDAL; PERINEURAL at 07:36

## 2025-05-30 RX ADMIN — FAMOTIDINE 20 MG: 10 INJECTION, SOLUTION INTRAVENOUS at 07:01

## 2025-05-30 RX ADMIN — ROCURONIUM BROMIDE 70 MG: 10 INJECTION INTRAVENOUS at 07:36

## 2025-05-30 RX ADMIN — ACETAMINOPHEN 650 MG: 325 TABLET ORAL at 11:14

## 2025-05-30 RX ADMIN — SODIUM CHLORIDE: 9 INJECTION, SOLUTION INTRAVENOUS at 07:29

## 2025-05-30 RX ADMIN — DEXAMETHASONE SODIUM PHOSPHATE 8 MG: 4 INJECTION, SOLUTION INTRAMUSCULAR; INTRAVENOUS at 07:44

## 2025-05-30 RX ADMIN — ONDANSETRON 4 MG: 2 INJECTION INTRAMUSCULAR; INTRAVENOUS at 10:28

## 2025-05-30 RX ADMIN — SUGAMMADEX 200 MG: 100 INJECTION, SOLUTION INTRAVENOUS at 10:28

## 2025-05-30 RX ADMIN — ATROPINE SULFATE 0.4 MG: 0.4 INJECTION, SOLUTION INTRAVENOUS at 08:30

## 2025-05-30 RX ADMIN — ROCURONIUM BROMIDE 10 MG: 10 INJECTION INTRAVENOUS at 09:32

## 2025-05-30 RX ADMIN — ROCURONIUM BROMIDE 20 MG: 10 INJECTION INTRAVENOUS at 10:15

## 2025-05-30 RX ADMIN — PHENYLEPHRINE HYDROCHLORIDE 0.2 MCG/KG/MIN: 10 INJECTION INTRAVENOUS at 07:47

## 2025-05-30 RX ADMIN — ROCURONIUM BROMIDE 20 MG: 10 INJECTION INTRAVENOUS at 08:28

## 2025-05-30 NOTE — Clinical Note
Number of grounding pads used: 1  Location of grounding pads: right thigh Problem: Physical Therapy Goal  Goal: Physical Therapy Goal  Goals to be met by: 17     Patient will increase functional independence with mobility by performin. Supine to sit with Contact Guard Assistance  2. Sit to supine with Contact Guard Assistance  3. Sit to stand transfer with Contact Guard Assistance  4. Gait x 50 feet with Minimal Assistance using Rolling Walker.    Pt progressing towards goals, has met goal #4. Recommend cont PT in SNF.

## 2025-05-30 NOTE — ANESTHESIA POSTPROCEDURE EVALUATION
Patient: Nesha Bacon    Procedure Summary       Date: 05/30/25 Room / Location: [facility] / [facility]    Anesthesia Start: 0729 Anesthesia Stop: 1036    Procedure: PFA; hold flecainide 5 days prior to procedure Diagnosis:       Paroxysmal atrial fibrillation      (afib)    Providers: Cisco Gilbert MD Provider: Jorje Durán MD    Anesthesia Type: general ASA Status: 3            Anesthesia Type: general    Vitals  Vitals Value Taken Time   /54 05/30/25 10:36   Temp 97 °F (36.1 °C) 05/30/25 10:36   Pulse 90 05/30/25 10:36   Resp 12 05/30/25 10:36   SpO2 96 % 05/30/25 10:36           Post Anesthesia Care and Evaluation    Patient location during evaluation: bedside (EP Lab)  Patient participation: complete - patient participated  Level of consciousness: awake and alert  Pain management: adequate    Airway patency: patent  Anesthetic complications: No anesthetic complications  PONV Status: none  Cardiovascular status: hemodynamically stable and acceptable  Respiratory status: nonlabored ventilation, acceptable and nasal cannula  Hydration status: acceptable

## 2025-05-30 NOTE — ANESTHESIA PREPROCEDURE EVALUATION
Anesthesia Evaluation     Patient summary reviewed and Nursing notes reviewed   history of anesthetic complications:  PONV               Airway   Mallampati: II  TM distance: >3 FB  Neck ROM: full  No difficulty expected  Dental - normal exam     Pulmonary - normal exam   (+) COPD,shortness of breath  Cardiovascular - normal exam    (+) hypertension, dysrhythmias Atrial Fib, hyperlipidemia      Neuro/Psych  (+) headaches, psychiatric history Anxiety  GI/Hepatic/Renal/Endo    (+) renal disease (RENAL CELL CA)-    Musculoskeletal     (+) back pain  Abdominal  - normal exam    Bowel sounds: normal.   Substance History - negative use     OB/GYN negative ob/gyn ROS         Other   arthritis,   history of cancer (RENAL)                      Anesthesia Plan    ASA 3     general     intravenous induction     Anesthetic plan, risks, benefits, and alternatives have been provided, discussed and informed consent has been obtained with: patient.    Plan discussed with CRNA.        CODE STATUS:

## 2025-05-30 NOTE — ANESTHESIA PROCEDURE NOTES
Airway  Reason: elective    Date/Time: 5/30/2025 7:37 AM  Airway not difficult    General Information and Staff    Patient location during procedure: OR  CRNA/CAA: Kayce Leon CRNA    Indications and Patient Condition  Indications for airway management: airway protection    Preoxygenated: yes  MILS not maintained throughout    Mask difficulty assessment: 1 - vent by mask    Final Airway Details    Final airway type: endotracheal airway      Successful airway: ETT  Cuffed: yes   Successful intubation technique: direct laryngoscopy  Adjuncts used in placement: intubating stylet  Endotracheal tube insertion site: oral  Blade: Ki  Blade size: 3  ETT size (mm): 7.0  Cormack-Lehane Classification: grade I - full view of glottis  Placement verified by: chest auscultation and capnometry   Measured from: lips  ETT/EBT  to lips (cm): 21  Number of attempts at approach: 1  Assessment: lips, teeth, and gum same as pre-op and atraumatic intubation    Additional Comments  Negative epigastric sounds, Breath sound equal bilaterally with symmetric chest rise and fall

## 2025-06-02 ENCOUNTER — CALL CENTER PROGRAMS (OUTPATIENT)
Dept: CALL CENTER | Facility: HOSPITAL | Age: 80
End: 2025-06-02
Payer: MEDICARE

## 2025-06-02 ENCOUNTER — TELEPHONE (OUTPATIENT)
Dept: CARDIOLOGY | Facility: CLINIC | Age: 80
End: 2025-06-02
Payer: MEDICARE

## 2025-06-02 RX ORDER — METOPROLOL SUCCINATE 25 MG/1
25 TABLET, EXTENDED RELEASE ORAL DAILY
Qty: 90 TABLET | Refills: 2 | Status: SHIPPED | OUTPATIENT
Start: 2025-06-02

## 2025-06-02 NOTE — OUTREACH NOTE
PCI/Device Survey      Flowsheet Row Responses   Facility patient discharged from? [facility]   Procedure date 05/30/25   Procedure (if device, specify in description) Ablation   Performing MD Dr. Cisco Gilbert   Attempt successful? Yes   Call start time 1007   Call end time 1034   Symptom comments Pt c/o weakness, H/A, ears popping, pain in jaws and nausea since Saturday. Pt reports that she is able to tolerate po intake. Pt states that she was dizzy prior to procedure. Pt denies chest pain, neck pain but has ongoing back pain that was present prior to procedure. Over weekend BP has been elevated, pt reports today, /105 before meds,  during call BP was 138/81 approx 1hr after meds. Pt c/o dizziness with and without standing up and also with moving her head. RN advised to contact MD or be evaluated at ER, pt v/u. RN offered to contact family or EMS, pt declined and reports that she will contact Dr. Kang or Vladimir and her Dtr.   Is the patient taking prescribed medications: --  [Pt is taking Xarelto daily, per her report]   Nursing intervention Reminded to continue to take prescribed medications, Nurse provided patient education   Medication comments Pt states that she has Losartan Potassium 50mg at home from previous prescription, that she took some this morning r/t her elevated BP. But has not taken Metoprolol yet as she typically takes Metoprolol Succinate 25mg at 4pm. RN educated pt to avoid taking medictaions that were discontinued without checking with MD first, pt v/u. Pt v/u that her Flecainide was stopped at DC.   Does the patient have any of the following symptoms related to the cath/surgical site? --  [Bilateral groin sites are without issue per pt report.]   Nursing intervention Patient education provided   Does the patient have an appointment scheduled with the cardiologist? Yes   PCI/Device call completed Yes            Taryn Solares Registered Nurse  
5

## 2025-06-05 RX ORDER — RIVAROXABAN 20 MG/1
20 TABLET, FILM COATED ORAL DAILY
Qty: 90 TABLET | Refills: 0 | Status: SHIPPED | OUTPATIENT
Start: 2025-06-05

## 2025-07-02 ENCOUNTER — OFFICE VISIT (OUTPATIENT)
Dept: CARDIOLOGY | Facility: HOSPITAL | Age: 80
End: 2025-07-02
Payer: MEDICARE

## 2025-07-02 VITALS
HEART RATE: 82 BPM | OXYGEN SATURATION: 97 % | WEIGHT: 139.2 LBS | RESPIRATION RATE: 18 BRPM | HEIGHT: 64 IN | BODY MASS INDEX: 23.76 KG/M2 | DIASTOLIC BLOOD PRESSURE: 88 MMHG | SYSTOLIC BLOOD PRESSURE: 138 MMHG

## 2025-07-02 DIAGNOSIS — I48.0 PAROXYSMAL ATRIAL FIBRILLATION: Primary | ICD-10-CM

## 2025-07-02 DIAGNOSIS — I95.1 AUTONOMIC ORTHOSTATIC HYPOTENSION: ICD-10-CM

## 2025-07-02 DIAGNOSIS — I10 ESSENTIAL HYPERTENSION: ICD-10-CM

## 2025-07-02 DIAGNOSIS — R11.0 NAUSEA: ICD-10-CM

## 2025-07-02 RX ORDER — FAMOTIDINE 20 MG/1
20 TABLET, FILM COATED ORAL 2 TIMES DAILY
Qty: 60 TABLET | Refills: 0 | Status: SHIPPED | OUTPATIENT
Start: 2025-07-02
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
## (undated) DEVICE — SOUNDSTAR ECO 8F G CATHETER: Brand: SOUNDSTAR

## (undated) DEVICE — ST EXT IV SMRTSTE 2VLV FIX M LL 6ML 41

## (undated) DEVICE — ST EXT IV SMARTSITE PINCH/CLMP 5ML 46CM

## (undated) DEVICE — SOL NACL 0.9PCT 1000ML

## (undated) DEVICE — 1 X VERSACROSS CONNECT TRANSSEPTAL DILATOR;  1 X VERSACROSS RF WIRE (INCLUDING 1 X CONNECTOR CABLE (SINGLE USE)): Brand: VERSACROSS CONNECT ACCESS SOLUTION FOR FARADRIVE

## (undated) DEVICE — PRESSURE MONITORING SET: Brand: TRUWAVE

## (undated) DEVICE — OCTA,GALAXY,3-3-3-3-3,D-CURVE: Brand: OCTARAY MAPPING CATHETER

## (undated) DEVICE — Device: Brand: WEBSTER CS

## (undated) DEVICE — STERILE (15.2 TAPERED TO 7.6 X 183CM) POLYETHYLENE ACCORDION-FOLDED COVER FOR USE WITH SIEMENS ACUNAV ULTRASOUND CATHETER FAMILY CONNECTOR: Brand: SWIFTLINK TRANSDUCER COVER

## (undated) DEVICE — GW INQWIRE ROSEN/TIP PTFE FC J/TP/1.5MM 0.035IN 180CM

## (undated) DEVICE — SET PRIMARY GRVTY 10DP MALE LL 104IN

## (undated) DEVICE — Device: Brand: MEDEX

## (undated) DEVICE — ELECTRD RETRN/GRND MEGADYNE SGL/PLT W/CORD 9FT DISP

## (undated) DEVICE — DRSNG SURESITE123 4X4.8IN

## (undated) DEVICE — ADULT, W/LG. BACK PAD, RADIOTRANSPARENT ELEMENT AND LEAD WIRE COMPATIBLE W/: Brand: DEFIBRILLATION ELECTRODES

## (undated) DEVICE — PINNACLE INTRODUCER SHEATH: Brand: PINNACLE

## (undated) DEVICE — NDL PERC 1PART ECHOTIP WO/BASEPLT 18G 7CM

## (undated) DEVICE — MYNX CONTROL™ VENOUS VASCULAR CLOSURE DEVICE 6F-12F: Brand: MYNX CONTROL™ VENOUS VASCULAR CLOSURE DEVICE 6F-12F

## (undated) DEVICE — KT MANIFLD EP

## (undated) DEVICE — LEX ELECTRO PHYSIOLOGY: Brand: MEDLINE INDUSTRIES, INC.

## (undated) DEVICE — TBG PRESS MON 48IN M/F L/L: Brand: MEDLINE INDUSTRIES, INC.

## (undated) DEVICE — SYS CLS VASC/VENI VASCADE/MVP 10TO12F XL

## (undated) DEVICE — PULSED FIELD ABLATION CATHETER: Brand: FARAWAVE™

## (undated) DEVICE — CATHETER CONNECTION CABLE: Brand: FARASTAR™

## (undated) DEVICE — STEERABLE SHEATH CLEAR: Brand: FARADRIVE™

## (undated) DEVICE — ACUMEN IQ CUFF ADULT: Brand: ACUMEN IQ CUFF ADULT

## (undated) DEVICE — ST INF PRI SMRTSTE 20DRP 2VLV 24ML 117

## (undated) DEVICE — DOME MONITORING W BONDED STPCK BIOTRANS2

## (undated) DEVICE — Device: Brand: REFERENCE PATCH CARTO 3

## (undated) DEVICE — DECANT BG O JET